# Patient Record
Sex: FEMALE | Race: WHITE | NOT HISPANIC OR LATINO | Employment: OTHER | URBAN - METROPOLITAN AREA
[De-identification: names, ages, dates, MRNs, and addresses within clinical notes are randomized per-mention and may not be internally consistent; named-entity substitution may affect disease eponyms.]

---

## 2017-01-12 ENCOUNTER — OFFICE VISIT (OUTPATIENT)
Dept: INTERNAL MEDICINE | Facility: CLINIC | Age: 67
End: 2017-01-12
Payer: COMMERCIAL

## 2017-01-12 VITALS
BODY MASS INDEX: 28.71 KG/M2 | WEIGHT: 157 LBS | HEART RATE: 78 BPM | OXYGEN SATURATION: 99 % | DIASTOLIC BLOOD PRESSURE: 76 MMHG | TEMPERATURE: 98.2 F | SYSTOLIC BLOOD PRESSURE: 114 MMHG

## 2017-01-12 DIAGNOSIS — F41.1 GENERALIZED ANXIETY DISORDER: ICD-10-CM

## 2017-01-12 DIAGNOSIS — B02.29 POST HERPETIC NEURALGIA: Primary | ICD-10-CM

## 2017-01-12 DIAGNOSIS — K21.9 GASTROESOPHAGEAL REFLUX DISEASE WITHOUT ESOPHAGITIS: ICD-10-CM

## 2017-01-12 PROCEDURE — 99214 OFFICE O/P EST MOD 30 MIN: CPT | Performed by: INTERNAL MEDICINE

## 2017-01-12 RX ORDER — ESOMEPRAZOLE MAGNESIUM 40 MG/1
40 CAPSULE, DELAYED RELEASE ORAL
Qty: 90 CAPSULE | Refills: 1 | Status: SHIPPED | OUTPATIENT
Start: 2017-01-12 | End: 2017-02-01

## 2017-01-12 RX ORDER — ZOLPIDEM TARTRATE 5 MG/1
5 TABLET ORAL AT BEDTIME
COMMUNITY
Start: 2017-01-12 | End: 2019-09-03

## 2017-01-12 RX ORDER — GABAPENTIN 300 MG/1
CAPSULE ORAL
Qty: 100 CAPSULE | Refills: 0 | Status: SHIPPED
Start: 2017-01-12 | End: 2017-04-04

## 2017-01-12 RX ORDER — LIDOCAINE 4 G/G
1-3 PATCH TOPICAL DAILY PRN
Qty: 50 PATCH | Refills: 2 | Status: SHIPPED | OUTPATIENT
Start: 2017-01-12 | End: 2017-04-04
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
## 2017-01-12 NOTE — PATIENT INSTRUCTIONS
Post herpetic neuralgia can take many weeks to get better, there is nothing that we can do to help the healing process, but the items below can help reduce the discomfort while your body take the time to heal.     *  Trial of Lidocaine patches to the affected area.  Place 1-3 patches right over the most painful spot, wear for 12 hours per day.  Pick whichever time period is the worst, either daytime or nighttime.     *  Gabapentin to help treat the nerve pain.  Take one 300 mg tablet per day in the evening for 2 days, then if no better, then take 1 tablet twice per day for 2 days, then 1 capsule three times per day for 2-3 days, then 2 capsules at bedtime, 1 cap in the morning and 1 in mid day.  Main side effects is drowsiness.     *  Contact me with an update in 2-3 weeks with your symptoms.     *  We can increase the dose of Gabapentin much higher if needed.     * back pain is due to sciatic nerve root irritation.

## 2017-01-12 NOTE — NURSING NOTE
"Chief Complaint   Patient presents with     Musculoskeletal Problem     R sciatic nerve pain X 4 days     Shingles     recheck med for nerve pain, tramadola nd gabapentin not working       Initial /76 mmHg  Pulse 78  Temp(Src) 98.2  F (36.8  C) (Oral)  Wt 157 lb (71.215 kg)  SpO2 99% Estimated body mass index is 28.71 kg/(m^2) as calculated from the following:    Height as of 12/5/16: 5' 2\" (1.575 m).    Weight as of this encounter: 157 lb (71.215 kg).  BP completed using cuff size: carlos Isaac CMA      "

## 2017-01-12 NOTE — MR AVS SNAPSHOT
After Visit Summary   1/12/2017    Liz Haider    MRN: [id number]           Patient Information     Date Of Birth          1950        Visit Information        Provider Department      1/12/2017 8:20 AM Abundio Burger MD [facility]        Today's Diagnoses     Post herpetic neuralgia    -  1     Generalized anxiety disorder         Gastroesophageal reflux disease without esophagitis           Care Instructions    Post herpetic neuralgia can take many weeks to get better, there is nothing that we can do to help the healing process, but the items below can help reduce the discomfort while your body take the time to heal.     *  Trial of Lidocaine patches to the affected area.  Place 1-3 patches right over the most painful spot, wear for 12 hours per day.  Pick whichever time period is the worst, either daytime or nighttime.     *  Gabapentin to help treat the nerve pain.  Take one 300 mg tablet per day in the evening for 2 days, then if no better, then take 1 tablet twice per day for 2 days, then 1 capsule three times per day for 2-3 days, then 2 capsules at bedtime, 1 cap in the morning and 1 in mid day.  Main side effects is drowsiness.     *  Contact me with an update in 2-3 weeks with your symptoms.     *  We can increase the dose of Gabapentin much higher if needed.     * back pain is due to sciatic nerve root irritation.          Follow-ups after your visit        Who to contact     If you have questions or need follow up information about today's clinic visit or your schedule please contact [facility] directly at [phone].  Normal or non-critical lab and imaging results will be communicated to you by MyChart, letter or phone within 4 business days after the clinic has received the results. If you do not hear from us within 7 days, please contact the clinic through MyChart or phone. If you have a critical or abnormal lab  result, we will notify you by phone as soon as possible.  Submit refill requests through [employer] or call your pharmacy and they will forward the refill request to us. Please allow 3 business days for your refill to be completed.          Additional Information About Your Visit        [facility] [facility] Information     [employer] gives you secure access to your electronic health record. If you see a primary care provider, you can also send messages to your care team and make appointments. If you have questions, please call your primary care clinic.  If you do not have a primary care provider, please call [phone] and they will assist you.        Care EveryWhere ID     This is your Care EveryWhere ID. This could be used by other organizations to access your [place] medical records  [id number]        Your Vitals Were     Pulse Temperature Pulse Oximetry             78 98.2  F (36.8  C) (Oral) 99%          Blood Pressure from Last 3 Encounters:   01/12/17 114/76   12/05/16 100/62   11/20/16 150/73    Weight from Last 3 Encounters:   01/12/17 157 lb (71.215 kg)   12/05/16 157 lb 12.8 oz (71.578 kg)   11/17/16 159 lb (72.122 kg)              Today, you had the following     No orders found for display         Today's Medication Changes          These changes are accurate as of: 1/12/17  9:19 AM.  If you have any questions, ask your nurse or doctor.               Start taking these medicines.        Dose/Directions    esomeprazole 40 MG CR capsule   Commonly known as:  nexIUM   Used for:  Gastroesophageal reflux disease without esophagitis   Started by:  Abundio Burger MD        Dose:  40 mg   Take 1 capsule (40 mg) by mouth every morning (before breakfast) Take 30-60 minutes before a eating.   Quantity:  90 capsule   Refills:  1       Lidocaine 4 % Ptch   Used for:  Post herpetic neuralgia   Started by:  Abundio Burger MD        Dose:  1-3 patch   Externally apply 1-3 patches topically daily as needed    Quantity:  50 patch   Refills:  2         These medicines have changed or have updated prescriptions.        Dose/Directions    gabapentin 300 MG capsule   Commonly known as:  NEURONTIN   This may have changed:  additional instructions   Used for:  Post herpetic neuralgia   Changed by:  Abundio Burger MD        Take 1 tablet (300 mg) every night for 2 days, then 1 tablet twice daily for 2 days, then 1 tablet TID for 2 days; then 2 cap at bedtime, 1 in am, 1 at noon   Quantity:  100 capsule   Refills:  0            Where to get your medicines      These medications were sent to [facility] [address] [place] [address] - [address] [facility]  [address] [facility] [address]     Phone:  [phone]    - esomeprazole 40 MG CR capsule  - gabapentin 300 MG capsule      Some of these will need a paper prescription and others can be bought over the counter.  Ask your nurse if you have questions.     Bring a paper prescription for each of these medications    - Lidocaine 4 % Ptch             Primary Care Provider Office Phone # Fax #    Abundio Burger [phone]       [facility] [address] [facility] [address]        Thank you!     Thank you for choosing [facility]  for your care. Our goal is always to provide you with excellent care. Hearing back from our patients is one way we can continue to improve our services. Please take a few minutes to complete the written survey that you may receive in the mail after your visit with us. Thank you!             Your Updated Medication List - Protect others around you: Learn how to safely use, store and throw away your medicines at www.disposemymeds.org.          This list is accurate as of: 1/12/17  9:19 AM.  Always use your most recent med list.                   Brand Name Dispense Instructions for use    * AMBIEN 10 MG tablet   Generic drug:  zolpidem      Take 0.5 tablets (5 mg) by  mouth nightly as needed for sleep       * AMBIEN 5 MG tablet   Generic drug:  zolpidem      Take 0.5-1 tablets (2.5-5 mg) by mouth nightly as needed for sleep       amLODIPine 10 MG tablet    NORVASC    90 tablet    Take 1 tablet (10 mg) by mouth every evening       aspirin 81 MG tablet     100    ONE DAILY       atenolol 100 MG tablet    TENORMIN    90 tablet    Take 1 tablet (100 mg) by mouth daily       atorvastatin 40 MG tablet    LIPITOR    90 tablet    Take 1 tablet (40 mg) by mouth At Bedtime       bimatoprost 0.03 % Soln    LATISSE    1 Bottle    Apply one per day at bedtime only to upper eyelid margin with applicator (not lower lid) as needed       CALCIUM 1200 PO      Take 2 tablets by mouth.       esomeprazole 40 MG CR capsule    nexIUM    90 capsule    Take 1 capsule (40 mg) by mouth every morning (before breakfast) Take 30-60 minutes before a eating.       gabapentin 300 MG capsule    NEURONTIN    100 capsule    Take 1 tablet (300 mg) every night for 2 days, then 1 tablet twice daily for 2 days, then 1 tablet TID for 2 days; then 2 cap at bedtime, 1 in am, 1 at noon       LANsoprazole 30 MG CR capsule    PREVACID    90 capsule    Take 1 capsule (30 mg) by mouth daily Take 30-60 minutes before a meal.       Lidocaine 4 % Ptch     50 patch    Externally apply 1-3 patches topically daily as needed       lisinopril 20 MG tablet    PRINIVIL/ZESTRIL    180 tablet    Take 1 tablet (20 mg) by mouth 2 times daily       LORazepam 0.5 MG tablet    ATIVAN    90 tablet    Take 1 tablet by mouth every 6 hours as needed for anxiety.       metoclopramide 5 MG tablet    REGLAN    90 tablet    Take 1 tablet (5 mg) by mouth 4 times daily as needed for nausea, esophageal reflux       Multi-vitamin Tabs tablet   Generic drug:  multivitamin, therapeutic with minerals     100    as directed       sertraline 100 MG tablet    ZOLOFT     Take 1 tablet (100 mg) by mouth daily Rx'd by psych       spironolactone 25 MG tablet     ALDACTONE    90 tablet    Take 1 tablet (25 mg) by mouth every morning       traMADol 50 MG tablet    ULTRAM    50 tablet    Take 1-2 tablets ( mg) by mouth 3 times daily as needed for moderate pain or severe pain maximum 6 tablet(s) per day       triamcinolone 0.1 % cream    KENALOG    45 g    Apply sparingly to affected area three times daily for 14 days.       VITAMIN D PO      Take 1 tablet by mouth       * Notice:  This list has 2 medication(s) that are the same as other medications prescribed for you. Read the directions carefully, and ask your doctor or other care provider to review them with you.

## 2017-01-26 ENCOUNTER — MYC MEDICAL ADVICE (OUTPATIENT)
Dept: INTERNAL MEDICINE | Facility: CLINIC | Age: 67
End: 2017-01-26

## 2017-01-26 DIAGNOSIS — K21.9 GASTROESOPHAGEAL REFLUX DISEASE WITHOUT ESOPHAGITIS: Primary | ICD-10-CM

## 2017-03-31 ENCOUNTER — RADIANT APPOINTMENT (OUTPATIENT)
Dept: MAMMOGRAPHY | Facility: CLINIC | Age: 67
End: 2017-03-31
Attending: INTERNAL MEDICINE
Payer: COMMERCIAL

## 2017-03-31 DIAGNOSIS — Z12.31 ENCOUNTER FOR SCREENING MAMMOGRAM FOR HIGH-RISK PATIENT: ICD-10-CM

## 2017-03-31 PROCEDURE — G0202 SCR MAMMO BI INCL CAD: HCPCS | Mod: TC

## 2017-04-04 ENCOUNTER — OFFICE VISIT (OUTPATIENT)
Dept: INTERNAL MEDICINE | Facility: CLINIC | Age: 67
End: 2017-04-04
Payer: COMMERCIAL

## 2017-04-04 ENCOUNTER — TELEPHONE (OUTPATIENT)
Dept: INTERNAL MEDICINE | Facility: CLINIC | Age: 67
End: 2017-04-04

## 2017-04-04 VITALS
HEART RATE: 69 BPM | HEIGHT: 62 IN | BODY MASS INDEX: 28.91 KG/M2 | TEMPERATURE: 97.9 F | DIASTOLIC BLOOD PRESSURE: 70 MMHG | WEIGHT: 157.1 LBS | SYSTOLIC BLOOD PRESSURE: 122 MMHG | OXYGEN SATURATION: 99 %

## 2017-04-04 DIAGNOSIS — I10 BENIGN ESSENTIAL HYPERTENSION: ICD-10-CM

## 2017-04-04 DIAGNOSIS — E78.5 HYPERLIPIDEMIA LDL GOAL <100: ICD-10-CM

## 2017-04-04 DIAGNOSIS — Z11.59 NEED FOR HEPATITIS C SCREENING TEST: ICD-10-CM

## 2017-04-04 DIAGNOSIS — F41.1 GENERALIZED ANXIETY DISORDER: ICD-10-CM

## 2017-04-04 DIAGNOSIS — I71.43 ANEURYSM OF INFRARENAL ABDOMINAL AORTA (H): ICD-10-CM

## 2017-04-04 DIAGNOSIS — Z13.29 SCREENING FOR THYROID DISORDER: ICD-10-CM

## 2017-04-04 DIAGNOSIS — Z00.00 MEDICARE ANNUAL WELLNESS VISIT, SUBSEQUENT: Primary | ICD-10-CM

## 2017-04-04 DIAGNOSIS — I10 ESSENTIAL HYPERTENSION: ICD-10-CM

## 2017-04-04 DIAGNOSIS — H02.729 HYPOTRICHOSIS OF EYELID, UNSPECIFIED LATERALITY: ICD-10-CM

## 2017-04-04 DIAGNOSIS — I25.119 CORONARY ARTERY DISEASE WITH ANGINA PECTORIS, UNSPECIFIED VESSEL OR LESION TYPE, UNSPECIFIED WHETHER NATIVE OR TRANSPLANTED HEART (H): ICD-10-CM

## 2017-04-04 LAB
BASOPHILS # BLD AUTO: 0 10E9/L (ref 0–0.2)
BASOPHILS NFR BLD AUTO: 0.4 %
DIFFERENTIAL METHOD BLD: NORMAL
EOSINOPHIL # BLD AUTO: 0.1 10E9/L (ref 0–0.7)
EOSINOPHIL NFR BLD AUTO: 1.1 %
ERYTHROCYTE [DISTWIDTH] IN BLOOD BY AUTOMATED COUNT: 13.4 % (ref 10–15)
HCT VFR BLD AUTO: 40.8 % (ref 35–47)
HGB BLD-MCNC: 13.3 G/DL (ref 11.7–15.7)
LYMPHOCYTES # BLD AUTO: 1.3 10E9/L (ref 0.8–5.3)
LYMPHOCYTES NFR BLD AUTO: 17 %
MCH RBC QN AUTO: 30.4 PG (ref 26.5–33)
MCHC RBC AUTO-ENTMCNC: 32.6 G/DL (ref 31.5–36.5)
MCV RBC AUTO: 93 FL (ref 78–100)
MONOCYTES # BLD AUTO: 0.6 10E9/L (ref 0–1.3)
MONOCYTES NFR BLD AUTO: 7.5 %
NEUTROPHILS # BLD AUTO: 5.6 10E9/L (ref 1.6–8.3)
NEUTROPHILS NFR BLD AUTO: 74 %
PLATELET # BLD AUTO: 217 10E9/L (ref 150–450)
RBC # BLD AUTO: 4.37 10E12/L (ref 3.8–5.2)
WBC # BLD AUTO: 7.5 10E9/L (ref 4–11)

## 2017-04-04 PROCEDURE — 99397 PER PM REEVAL EST PAT 65+ YR: CPT | Performed by: INTERNAL MEDICINE

## 2017-04-04 PROCEDURE — 84443 ASSAY THYROID STIM HORMONE: CPT | Performed by: INTERNAL MEDICINE

## 2017-04-04 PROCEDURE — 80061 LIPID PANEL: CPT | Performed by: INTERNAL MEDICINE

## 2017-04-04 PROCEDURE — 80053 COMPREHEN METABOLIC PANEL: CPT | Performed by: INTERNAL MEDICINE

## 2017-04-04 PROCEDURE — 86803 HEPATITIS C AB TEST: CPT | Performed by: INTERNAL MEDICINE

## 2017-04-04 PROCEDURE — 36415 COLL VENOUS BLD VENIPUNCTURE: CPT | Performed by: INTERNAL MEDICINE

## 2017-04-04 PROCEDURE — 85025 COMPLETE CBC W/AUTO DIFF WBC: CPT | Performed by: INTERNAL MEDICINE

## 2017-04-04 RX ORDER — BIMATOPROST 3 UG/ML
SOLUTION TOPICAL
Qty: 1 BOTTLE | Refills: 1 | Status: SHIPPED | OUTPATIENT
Start: 2017-04-04 | End: 2017-08-17

## 2017-04-04 NOTE — NURSING NOTE
"Chief Complaint   Patient presents with     Medicare Visit     pt is fasting       Initial /70  Pulse 69  Temp 97.9  F (36.6  C) (Oral)  Ht 5' 1.5\" (1.562 m)  Wt 157 lb 1.6 oz (71.3 kg)  SpO2 99%  BMI 29.2 kg/m2 Estimated body mass index is 29.2 kg/(m^2) as calculated from the following:    Height as of this encounter: 5' 1.5\" (1.562 m).    Weight as of this encounter: 157 lb 1.6 oz (71.3 kg).  Medication Reconciliation: complete   Lizzy Isaac CMA      "

## 2017-04-04 NOTE — PATIENT INSTRUCTIONS
"  *  Continue all medications at the same doses.  Contact your usual pharmacy if you need refills.     *  Abdominal ultrasound to recheck the small infrarenal abdominal aortic aneurysm that has remained stable over past few years.   Either get this at [facility] or [facility]       CARPAL TUNNEL SYNDROME:     *  The numbness and tingling in your hand(s) is caused by carpal tunnel syndrome, an irritation and inflammation of the median nerve at your wrist.     *  Use a wrist brace to help keep the wrist in a neutral position to help take pressure off that nerve.              *  Specifically wear the wrist brace when you are sleeping, when you drive, when you use the computer, and any other time that you are active with your hands.  It is OK to take the wrist brace to bathe or any other time.    *  Avoid or modify activities that require lots of hand, or wrist movements.      *  If you continue to have symptoms dsepite these conservative measures, then you may need to see the orthopedic hand surgeons for consideration of a surgical procedure to release the pressure in the carpal tunnel to cure the carpal tunnel syndrome.  Let us know if this is the case and we can refer you.          5 GOALS TO PREVENT VASCULAR DISEASE:     1.  Aggressive blood pressure control, under 130/80 ideally.  Using medications if needed.    Your blood pressure is under good control    BP Readings from Last 4 Encounters:   04/04/17 122/70   01/12/17 114/76   12/05/16 100/62   11/20/16 150/73       2.  Aggressive LDL cholesterol (\"bad cholesterol\") lowering as indicated.    Your goal is an LDL under 130 for sure, preferably under 100.  (If you have diabetes or previous vascular disease, the the LDL goals would be under 100 for sure, preferably under 70.)    New guidelines identify four high-risk groups who could benefit from statins:   *people with pre-existing heart disease, such as those who have had a heart attack;   *people " "ages 40 to 75 who have diabetes of any type  *patients ages 40 to 75 with at least a 7.5% risk of developing cardiovascular disease over the next decade, according to a formula described in the guidelines  *patients with the sort of super-high cholesterol that sometimes runs in families, as evidenced by an LDL of 190 milligrams per deciliter or higher    Your cholesterol levels are well controlled.    Recent Labs   Lab Test  07/21/16   1056  06/09/15   1157  10/07/14   1002   CHOL  188  179  198   HDL  69  69  85   LDL  99  92  98   TRIG  100  89  74   CHOLHDLRATIO   --   2.6  2.3       3.  Aggressive diabetic prevention, screening and/or management.      You do not have diabetes as of the most recent blood tests.     4.  No smoking    5.  Consider taking low dose aspirin (81 mg) tablet once per day over the age of 50, every day unless there is a specific reason that you cannot take aspirin (such as side effect, allergy, or you are on another \"blood thinner\").        --Based on your current cardiac risk factors, you should take Aspirin 81 mg once per day if you are over 50 years of age.             Preventive Health Recommendations    Female Ages 65 +    Yearly exam:     See your health care provider every year in order to  o Review health changes.   o Discuss preventive care.    o Review your medicines if your doctor has prescribed any.      You no longer need a yearly Pap test unless you've had an abnormal Pap test in the past 10 years. If you have vaginal symptoms, such as bleeding or discharge, be sure to talk with your provider about a Pap test.      Every 1 to 2 years, have a mammogram.  If you are over 69, talk with your health care provider about whether or not you want to continue having screening mammograms.      Every 10 years, have a colonoscopy. Or, have a yearly FIT test (stool test). These exams will check for colon cancer.       Have a cholesterol test every 5 years, or more often if your doctor " advises it.       Have a diabetes test (fasting glucose) every three years. If you are at risk for diabetes, you should have this test more often.       At age 65, have a bone density scan (DEXA) to check for osteoporosis (brittle bone disease).    Shots:    Get a flu shot each year.    Get a tetanus shot every 10 years.    Talk to your doctor about your pneumonia vaccines. There are now two you should receive - Pneumovax (PPSV 23) and Prevnar (PCV 13).    Talk to your doctor about the shingles vaccine.    Talk to your doctor about the hepatitis B vaccine.    Nutrition:     Eat at least 5 servings of fruits and vegetables each day.      Eat whole-grain bread, whole-wheat pasta and brown rice instead of white grains and rice.      Talk to your provider about Calcium and Vitamin D.     Lifestyle    Exercise at least 150 minutes a week (30 minutes a day, 5 days a week). This will help you control your weight and prevent disease.      Limit alcohol to one drink per day.      No smoking.       Wear sunscreen to prevent skin cancer.       See your dentist twice a year for an exam and cleaning.      See your eye doctor every 1 to 2 years to screen for conditions such as glaucoma, macular degeneration and cataracts.

## 2017-04-04 NOTE — TELEPHONE ENCOUNTER
latisse 0.03%      Last Written Prescription Date:  08/15/16  Last Fill Quantity: 1bottle,   # refills: 2  Last Office Visit with [facility] prescribing provider: 01/12/17  Future Office visit:   04/04/17    Routing refill request to provider for review/approval because:  Drug not on the [facility] refill protocol or controlled substance

## 2017-04-04 NOTE — MR AVS SNAPSHOT
After Visit Summary   4/4/2017    Liz Haider    MRN: [id number]           Patient Information     Date Of Birth          1950        Visit Information        Provider Department      4/4/2017 8:20 AM Abundio Burger MD [facility]        Today's Diagnoses     Medicare annual wellness visit, subsequent    -  1    Coronary artery disease with prior NSTEMI (HCC)        Benign essential hypertension        Hyperlipidemia LDL goal <100        Essential hypertension        Generalized anxiety disorder        Screening for thyroid disorder        Need for hepatitis C screening test        Aneurysm of infrarenal abdominal aorta (H)          Care Instructions      *  Continue all medications at the same doses.  Contact your usual pharmacy if you need refills.     *  Abdominal ultrasound to recheck the small infrarenal abdominal aortic aneurysm that has remained stable over past few years.   Either get this at [facility] or [facility]       CARPAL TUNNEL SYNDROME:     *  The numbness and tingling in your hand(s) is caused by carpal tunnel syndrome, an irritation and inflammation of the median nerve at your wrist.     *  Use a wrist brace to help keep the wrist in a neutral position to help take pressure off that nerve.              *  Specifically wear the wrist brace when you are sleeping, when you drive, when you use the computer, and any other time that you are active with your hands.  It is OK to take the wrist brace to bathe or any other time.    *  Avoid or modify activities that require lots of hand, or wrist movements.      *  If you continue to have symptoms dsepite these conservative measures, then you may need to see the orthopedic hand surgeons for consideration of a surgical procedure to release the pressure in the carpal tunnel to cure the carpal tunnel syndrome.  Let us know if this is the case and we can refer you.          5 GOALS TO  "PREVENT VASCULAR DISEASE:     1.  Aggressive blood pressure control, under 130/80 ideally.  Using medications if needed.    Your blood pressure is under good control    BP Readings from Last 4 Encounters:   04/04/17 122/70   01/12/17 114/76   12/05/16 100/62   11/20/16 150/73       2.  Aggressive LDL cholesterol (\"bad cholesterol\") lowering as indicated.    Your goal is an LDL under 130 for sure, preferably under 100.  (If you have diabetes or previous vascular disease, the the LDL goals would be under 100 for sure, preferably under 70.)    New guidelines identify four high-risk groups who could benefit from statins:   *people with pre-existing heart disease, such as those who have had a heart attack;   *people ages 40 to 75 who have diabetes of any type  *patients ages 40 to 75 with at least a 7.5% risk of developing cardiovascular disease over the next decade, according to a formula described in the guidelines  *patients with the sort of super-high cholesterol that sometimes runs in families, as evidenced by an LDL of 190 milligrams per deciliter or higher    Your cholesterol levels are well controlled.    Recent Labs   Lab Test  07/21/16   1056  06/09/15   1157  10/07/14   1002   CHOL  188  179  198   HDL  69  69  85   LDL  99  92  98   TRIG  100  89  74   CHOLHDLRATIO   --   2.6  2.3       3.  Aggressive diabetic prevention, screening and/or management.      You do not have diabetes as of the most recent blood tests.     4.  No smoking    5.  Consider taking low dose aspirin (81 mg) tablet once per day over the age of 50, every day unless there is a specific reason that you cannot take aspirin (such as side effect, allergy, or you are on another \"blood thinner\").        --Based on your current cardiac risk factors, you should take Aspirin 81 mg once per day if you are over 50 years of age.             Preventive Health Recommendations    Female Ages 65 +    Yearly exam:     See your health care provider every " year in order to  o Review health changes.   o Discuss preventive care.    o Review your medicines if your doctor has prescribed any.      You no longer need a yearly Pap test unless you've had an abnormal Pap test in the past 10 years. If you have vaginal symptoms, such as bleeding or discharge, be sure to talk with your provider about a Pap test.      Every 1 to 2 years, have a mammogram.  If you are over 69, talk with your health care provider about whether or not you want to continue having screening mammograms.      Every 10 years, have a colonoscopy. Or, have a yearly FIT test (stool test). These exams will check for colon cancer.       Have a cholesterol test every 5 years, or more often if your doctor advises it.       Have a diabetes test (fasting glucose) every three years. If you are at risk for diabetes, you should have this test more often.       At age 65, have a bone density scan (DEXA) to check for osteoporosis (brittle bone disease).    Shots:    Get a flu shot each year.    Get a tetanus shot every 10 years.    Talk to your doctor about your pneumonia vaccines. There are now two you should receive - Pneumovax (PPSV 23) and Prevnar (PCV 13).    Talk to your doctor about the shingles vaccine.    Talk to your doctor about the hepatitis B vaccine.    Nutrition:     Eat at least 5 servings of fruits and vegetables each day.      Eat whole-grain bread, whole-wheat pasta and brown rice instead of white grains and rice.      Talk to your provider about Calcium and Vitamin D.     Lifestyle    Exercise at least 150 minutes a week (30 minutes a day, 5 days a week). This will help you control your weight and prevent disease.      Limit alcohol to one drink per day.      No smoking.       Wear sunscreen to prevent skin cancer.       See your dentist twice a year for an exam and cleaning.      See your eye doctor every 1 to 2 years to screen for conditions such as glaucoma, macular degeneration and  "cataracts.        Follow-ups after your visit        Future tests that were ordered for you today     Open Future Orders        Priority Expected Expires Ordered    US abdominal aorta limited Routine  4/4/2018 4/4/2017            Who to contact     If you have questions or need follow up information about today's clinic visit or your schedule please contact [facility] directly at [phone].  Normal or non-critical lab and imaging results will be communicated to you by [employer], letter or phone within 4 business days after the clinic has received the results. If you do not hear from us within 7 days, please contact the clinic through [employer] or phone. If you have a critical or abnormal lab result, we will notify you by phone as soon as possible.  Submit refill requests through [employer] or call your pharmacy and they will forward the refill request to us. Please allow 3 business days for your refill to be completed.          Additional Information About Your Visit        [employer] Information     [employer] gives you secure access to your electronic health record. If you see a primary care provider, you can also send messages to your care team and make appointments. If you have questions, please call your primary care clinic.  If you do not have a primary care provider, please call [phone] and they will assist you.        Care EveryWhere ID     This is your Care EveryWhere ID. This could be used by other organizations to access your [facility] medical records  [id number]        Your Vitals Were     Pulse Temperature Height Pulse Oximetry BMI (Body Mass Index)       69 97.9  F (36.6  C) (Oral) 5' 1.5\" (1.562 m) 99% 29.2 kg/m2        Blood Pressure from Last 3 Encounters:   04/04/17 122/70   01/12/17 114/76   12/05/16 100/62    Weight from Last 3 Encounters:   04/04/17 157 lb 1.6 oz (71.3 kg)   01/12/17 157 lb (71.2 kg)   12/05/16 157 lb 12.8 oz (71.6 kg)              We Performed the Following "     CBC with platelets and differential     Comprehensive metabolic panel (BMP + Alb, Alk Phos, ALT, AST, Total. Bili, TP)     Hepatitis C antibody     Lipid Profile with reflex to direct LDL     TSH with free T4 reflex          Today's Medication Changes          These changes are accurate as of: 4/4/17  9:23 AM.  If you have any questions, ask your nurse or doctor.               Stop taking these medicines if you haven't already. Please contact your care team if you have questions.     LANsoprazole 30 MG CR capsule   Commonly known as:  PREVACID   Stopped by:  Abundio Burger MD                    Primary Care Provider Office Phone # Fax #    Abundio Burger [phone]       [facility] [address]  [facility] [address]        Thank you!     Thank you for choosing [facility]  for your care. Our goal is always to provide you with excellent care. Hearing back from our patients is one way we can continue to improve our services. Please take a few minutes to complete the written survey that you may receive in the mail after your visit with us. Thank you!             Your Updated Medication List - Protect others around you: Learn how to safely use, store and throw away your medicines at www.disposemymeds.org.          This list is accurate as of: 4/4/17  9:23 AM.  Always use your most recent med list.                   Brand Name Dispense Instructions for use    AMBIEN 5 MG tablet   Generic drug:  zolpidem      Take 0.5-1 tablets (2.5-5 mg) by mouth nightly as needed for sleep       amLODIPine 10 MG tablet    NORVASC    90 tablet    Take 1 tablet (10 mg) by mouth every evening       aspirin 81 MG tablet     100    ONE DAILY       atenolol 100 MG tablet    TENORMIN    90 tablet    Take 1 tablet (100 mg) by mouth daily       atorvastatin 40 MG tablet    LIPITOR    90 tablet    Take 1 tablet (40 mg) by mouth At Bedtime       bimatoprost 0.03 % Soln     LATISSE    1 Bottle    Apply one per day at bedtime only to upper eyelid margin with applicator (not lower lid) as needed       CALCIUM 1200 PO      Take 2 tablets by mouth.       lisinopril 20 MG tablet    PRINIVIL/ZESTRIL    180 tablet    Take 1 tablet (20 mg) by mouth 2 times daily       LORazepam 0.5 MG tablet    ATIVAN    90 tablet    Take 1 tablet by mouth every 6 hours as needed for anxiety.       Multi-vitamin Tabs tablet   Generic drug:  multivitamin, therapeutic with minerals     100    as directed       omeprazole 20 MG CR capsule    priLOSEC    90 capsule    Take 1 capsule (20 mg) by mouth daily       sertraline 100 MG tablet    ZOLOFT     Take 1 tablet (100 mg) by mouth daily Rx'd by psych       spironolactone 25 MG tablet    ALDACTONE    90 tablet    Take 1 tablet (25 mg) by mouth every morning       triamcinolone 0.1 % cream    KENALOG    45 g    Apply sparingly to affected area three times daily for 14 days.       VITAMIN D PO      Take 1 tablet by mouth
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
## 2017-04-05 ENCOUNTER — TELEPHONE (OUTPATIENT)
Dept: INTERNAL MEDICINE | Facility: CLINIC | Age: 67
End: 2017-04-05

## 2017-04-05 ENCOUNTER — RADIANT APPOINTMENT (OUTPATIENT)
Dept: ULTRASOUND IMAGING | Facility: CLINIC | Age: 67
End: 2017-04-05
Attending: INTERNAL MEDICINE
Payer: COMMERCIAL

## 2017-04-05 DIAGNOSIS — I71.43 ANEURYSM OF INFRARENAL ABDOMINAL AORTA (H): ICD-10-CM

## 2017-04-05 LAB
ALBUMIN SERPL-MCNC: 4.1 G/DL (ref 3.4–5)
ALP SERPL-CCNC: 64 U/L (ref 40–150)
ALT SERPL W P-5'-P-CCNC: 20 U/L (ref 0–50)
ANION GAP SERPL CALCULATED.3IONS-SCNC: 8 MMOL/L (ref 3–14)
AST SERPL W P-5'-P-CCNC: 13 U/L (ref 0–45)
BILIRUB SERPL-MCNC: 0.6 MG/DL (ref 0.2–1.3)
BUN SERPL-MCNC: 18 MG/DL (ref 7–30)
CALCIUM SERPL-MCNC: 9.4 MG/DL (ref 8.5–10.1)
CHLORIDE SERPL-SCNC: 101 MMOL/L (ref 94–109)
CHOLEST SERPL-MCNC: 175 MG/DL
CO2 SERPL-SCNC: 27 MMOL/L (ref 20–32)
CREAT SERPL-MCNC: 0.87 MG/DL (ref 0.52–1.04)
GFR SERPL CREATININE-BSD FRML MDRD: 64 ML/MIN/1.7M2
GLUCOSE SERPL-MCNC: 103 MG/DL (ref 70–99)
HCV AB SERPL QL IA: NORMAL
HDLC SERPL-MCNC: 66 MG/DL
LDLC SERPL CALC-MCNC: 92 MG/DL
NONHDLC SERPL-MCNC: 109 MG/DL
POTASSIUM SERPL-SCNC: 4.5 MMOL/L (ref 3.4–5.3)
PROT SERPL-MCNC: 7.2 G/DL (ref 6.8–8.8)
SODIUM SERPL-SCNC: 136 MMOL/L (ref 133–144)
TRIGL SERPL-MCNC: 86 MG/DL
TSH SERPL DL<=0.005 MIU/L-ACNC: 1.27 MU/L (ref 0.4–4)

## 2017-04-05 PROCEDURE — 76775 US EXAM ABDO BACK WALL LIM: CPT

## 2017-04-05 NOTE — TELEPHONE ENCOUNTER
"Please call the patient.     The ultrasound showed no evidence for abdominal aortic aneurysm.   The prior \"aneurysm\" reported years ago was actually not a true aneurysm by size criteria.  The abdominal aorta was borderline widened, and perhaps \"over called\" as a abdominal aortic aneurysm back a few years ago.    No further studies needed.        "

## 2017-04-06 DIAGNOSIS — I10 ESSENTIAL HYPERTENSION: ICD-10-CM

## 2017-04-07 RX ORDER — AMLODIPINE BESYLATE 10 MG/1
TABLET ORAL
Qty: 90 TABLET | Refills: 3 | Status: SHIPPED | OUTPATIENT
Start: 2017-04-07 | End: 2018-01-12

## 2017-04-07 NOTE — TELEPHONE ENCOUNTER
amLODIPine (NORVASC) 10 MG tablet      Last Written Prescription Date: 7/13/16  Last Fill Quantity: 90, # refills: 2  Last Office Visit with G, P or [facility] prescribing provider: 4/4/17       Potassium   Date Value Ref Range Status   04/04/2017 4.5 3.4 - 5.3 mmol/L Final     Creatinine   Date Value Ref Range Status   04/04/2017 0.87 0.52 - 1.04 mg/dL Final     BP Readings from Last 3 Encounters:   04/04/17 122/70   01/12/17 114/76   12/05/16 100/62

## 2017-04-13 ENCOUNTER — TELEPHONE (OUTPATIENT)
Dept: INTERNAL MEDICINE | Facility: CLINIC | Age: 67
End: 2017-04-13

## 2017-04-13 NOTE — TELEPHONE ENCOUNTER
Prior authorization    Medication name bimatoprost  Insurance medica  Insurance ID number [id number]  Prior authorization faxed through cover my meds

## 2017-04-27 NOTE — TELEPHONE ENCOUNTER
Please let patient know that this medication not covered.    If she wants to use it, she must pay cash for it.   There is no PA to be had for this.     Close encounter when done.

## 2017-05-03 ENCOUNTER — OFFICE VISIT (OUTPATIENT)
Dept: INTERNAL MEDICINE | Facility: CLINIC | Age: 67
End: 2017-05-03
Payer: COMMERCIAL

## 2017-05-03 VITALS
TEMPERATURE: 97.8 F | HEART RATE: 74 BPM | DIASTOLIC BLOOD PRESSURE: 72 MMHG | SYSTOLIC BLOOD PRESSURE: 142 MMHG | HEIGHT: 62 IN | WEIGHT: 158.7 LBS | OXYGEN SATURATION: 98 % | BODY MASS INDEX: 29.21 KG/M2

## 2017-05-03 DIAGNOSIS — R30.0 DYSURIA: ICD-10-CM

## 2017-05-03 DIAGNOSIS — N76.0 VAGINITIS AND VULVOVAGINITIS: Primary | ICD-10-CM

## 2017-05-03 LAB
ALBUMIN UR-MCNC: NEGATIVE MG/DL
APPEARANCE UR: CLEAR
BILIRUB UR QL STRIP: NEGATIVE
COLOR UR AUTO: YELLOW
GLUCOSE UR STRIP-MCNC: NEGATIVE MG/DL
HGB UR QL STRIP: ABNORMAL
KETONES UR STRIP-MCNC: NEGATIVE MG/DL
LEUKOCYTE ESTERASE UR QL STRIP: ABNORMAL
NITRATE UR QL: NEGATIVE
PH UR STRIP: 6 PH (ref 5–7)
RBC #/AREA URNS AUTO: ABNORMAL /HPF (ref 0–2)
SP GR UR STRIP: <=1.005 (ref 1–1.03)
URN SPEC COLLECT METH UR: ABNORMAL
UROBILINOGEN UR STRIP-ACNC: 0.2 EU/DL (ref 0.2–1)
WBC #/AREA URNS AUTO: ABNORMAL /HPF (ref 0–2)

## 2017-05-03 PROCEDURE — 99213 OFFICE O/P EST LOW 20 MIN: CPT | Performed by: PHYSICIAN ASSISTANT

## 2017-05-03 PROCEDURE — 81001 URINALYSIS AUTO W/SCOPE: CPT | Performed by: PHYSICIAN ASSISTANT

## 2017-05-03 RX ORDER — FLUCONAZOLE 150 MG/1
150 TABLET ORAL ONCE
Qty: 1 TABLET | Refills: 0 | Status: SHIPPED | OUTPATIENT
Start: 2017-05-03 | End: 2017-05-03

## 2017-05-03 NOTE — NURSING NOTE
"Chief Complaint   Patient presents with     Dysuria     since am        Initial /72 (BP Location: Left arm, Patient Position: Chair, Cuff Size: Adult Regular)  Pulse 74  Temp 97.8  F (36.6  C) (Oral)  Ht 5' 1.5\" (1.562 m)  Wt 158 lb 11.2 oz (72 kg)  SpO2 98%  BMI 29.5 kg/m2 Estimated body mass index is 29.5 kg/(m^2) as calculated from the following:    Height as of this encounter: 5' 1.5\" (1.562 m).    Weight as of this encounter: 158 lb 11.2 oz (72 kg).  Medication Reconciliation: complete    "

## 2017-05-03 NOTE — MR AVS SNAPSHOT
"              After Visit Summary   5/3/2017    Liz Haider    MRN: [id number]           Patient Information     Date Of Birth          1950        Visit Information        Provider Department      5/3/2017 3:00 PM Ria Douglas PA-C [facility]        Today's Diagnoses     Vaginitis and vulvovaginitis    -  1    Dysuria          Care Instructions    Hold your cholesterol medications for the next 2 nights after taking the diflucan        Follow-ups after your visit        Who to contact     If you have questions or need follow up information about today's clinic visit or your schedule please contact [facility] directly at [phone].  Normal or non-critical lab and imaging results will be communicated to you by [employer], letter or phone within 4 business days after the clinic has received the results. If you do not hear from us within 7 days, please contact the clinic through [employer] or phone. If you have a critical or abnormal lab result, we will notify you by phone as soon as possible.  Submit refill requests through [employer] or call your pharmacy and they will forward the refill request to us. Please allow 3 business days for your refill to be completed.          Additional Information About Your Visit        MyChart Information     [employer] gives you secure access to your electronic health record. If you see a primary care provider, you can also send messages to your care team and make appointments. If you have questions, please call your primary care clinic.  If you do not have a primary care provider, please call [phone] and they will assist you.        Care EveryWhere ID     This is your Care EveryWhere ID. This could be used by other organizations to access your [facility] medical records  [id number]        Your Vitals Were     Pulse Temperature Height Pulse Oximetry BMI (Body Mass Index)       74 97.8  F (36.6  C) (Oral) 5' 1.5\" (1.562 " m) 98% 29.5 kg/m2        Blood Pressure from Last 3 Encounters:   05/03/17 142/72   04/04/17 122/70   01/12/17 114/76    Weight from Last 3 Encounters:   05/03/17 158 lb 11.2 oz (72 kg)   04/04/17 157 lb 1.6 oz (71.3 kg)   01/12/17 157 lb (71.2 kg)              We Performed the Following     UA with Microscopic reflex to Culture          Today's Medication Changes          These changes are accurate as of: 5/3/17  3:45 PM.  If you have any questions, ask your nurse or doctor.               Start taking these medicines.        Dose/Directions    fluconazole 150 MG tablet   Commonly known as:  DIFLUCAN   Used for:  Vaginitis and vulvovaginitis   Started by:  Ria Douglas PA-C        Dose:  150 mg   Take 1 tablet (150 mg) by mouth once for 1 dose   Quantity:  1 tablet   Refills:  0            Where to get your medicines      These medications were sent to [facility] [address] [place] [id number]     Phone:  [phone]     fluconazole 150 MG tablet                Primary Care Provider Office Phone # Fax #    Abundio Burger [phone]       [facility] [address] [facility] [address]        Thank you!     Thank you for choosing [facility]  for your care. Our goal is always to provide you with excellent care. Hearing back from our patients is one way we can continue to improve our services. Please take a few minutes to complete the written survey that you may receive in the mail after your visit with us. Thank you!             Your Updated Medication List - Protect others around you: Learn how to safely use, store and throw away your medicines at www.disposemymeds.org.          This list is accurate as of: 5/3/17  3:45 PM.  Always use your most recent med list.                   Brand Name Dispense Instructions for use    AMBIEN 5 MG tablet   Generic drug:  zolpidem       Take 0.5-1 tablets (2.5-5 mg) by mouth nightly as needed for sleep       amLODIPine 10 MG tablet    NORVASC    90 tablet    TAKE ONE TABLET BY MOUTH ONCE DAILY IN THE EVENING       aspirin 81 MG tablet     100    ONE DAILY       atenolol 100 MG tablet    TENORMIN    90 tablet    Take 1 tablet (100 mg) by mouth daily       atorvastatin 40 MG tablet    LIPITOR    90 tablet    Take 1 tablet (40 mg) by mouth At Bedtime       bimatoprost 0.03 % Soln    LATISSE    1 Bottle    Apply one per day at bedtime only to upper eyelid margin with applicator (not lower lid) as needed       CALCIUM 1200 PO      Take 2 tablets by mouth.       fluconazole 150 MG tablet    DIFLUCAN    1 tablet    Take 1 tablet (150 mg) by mouth once for 1 dose       lisinopril 20 MG tablet    PRINIVIL/ZESTRIL    180 tablet    Take 1 tablet (20 mg) by mouth 2 times daily       LORazepam 0.5 MG tablet    ATIVAN    90 tablet    Take 1 tablet by mouth every 6 hours as needed for anxiety.       Multi-vitamin Tabs tablet   Generic drug:  multivitamin, therapeutic with minerals     100    as directed       omeprazole 20 MG CR capsule    priLOSEC    90 capsule    Take 1 capsule (20 mg) by mouth daily       sertraline 100 MG tablet    ZOLOFT     Take 1 tablet (100 mg) by mouth daily Rx'd by psych       spironolactone 25 MG tablet    ALDACTONE    90 tablet    Take 1 tablet (25 mg) by mouth every morning       triamcinolone 0.1 % cream    KENALOG    45 g    Apply sparingly to affected area three times daily for 14 days.       VITAMIN D PO      Take 1 tablet by mouth

## 2017-05-03 NOTE — PROGRESS NOTES
"  SUBJECTIVE:                                                    Liz Haider is a 66 year old female who presents to clinic today for the following health issues:      URINARY TRACT SYMPTOMS     Onset: since this morning    Description:   Painful urination (Dysuria): YES  Blood in urine (Hematuria): YES- when wiping had pinkish tint     Delay in urine (Hesitency): no     Intensity: moderate    Progression of Symptoms:  improving    Accompanying Signs & Symptoms:  Fever/chills: no   Flank pain no  Mild low back pain   Nausea and vomiting: no   Any vaginal symptoms: odor,and irritation itching.   Has to wear a pad due to irritable bowel issues.    Abdominal/Pelvic Pain: no    History:   History of frequent UTI's: no   History of kidney stones: no   Sexually Active: no   Possibility of pregnancy: No    Precipitating factors:            Therapies Tried and outcome: otc monistat for yeast.             -------------------------------------    Problem list and histories reviewed & adjusted, as indicated.  Additional history: as documented    Labs reviewed in EPIC    Reviewed and updated as needed this visit by clinical staff  Tobacco  Allergies       Reviewed and updated as needed this visit by Provider         ROS:  Constitutional, HEENT, cardiovascular, pulmonary, gi and gu systems are negative, except as otherwise noted.    OBJECTIVE:                                                    /72 (BP Location: Left arm, Patient Position: Chair, Cuff Size: Adult Regular)  Pulse 74  Temp 97.8  F (36.6  C) (Oral)  Ht 5' 1.5\" (1.562 m)  Wt 158 lb 11.2 oz (72 kg)  SpO2 98%  BMI 29.5 kg/m2  Body mass index is 29.5 kg/(m^2).  GENERAL: healthy, alert and no distress  RESP: lungs clear to auscultation - no rales, rhonchi or wheezes  CV: regular rate and rhythm, normal S1 S2, no S3 or S4, no murmur, click or rub, no peripheral edema and peripheral pulses strong  ABDOMEN: soft, nontender, no hepatosplenomegaly, no masses " and bowel sounds normal  MS: no gross musculoskeletal defects noted, no edema    Diagnostic Test Results:  Results for orders placed or performed in visit on 05/03/17 (from the past 24 hour(s))   UA with Microscopic reflex to Culture   Result Value Ref Range    Color Urine Yellow     Appearance Urine Clear     Glucose Urine Negative NEG mg/dL    Bilirubin Urine Negative NEG    Ketones Urine Negative NEG mg/dL    Specific Gravity Urine <=1.005 1.003 - 1.035    pH Urine 6.0 5.0 - 7.0 pH    Protein Albumin Urine Negative NEG mg/dL    Urobilinogen Urine 0.2 0.2 - 1.0 EU/dL    Nitrite Urine Negative NEG    Blood Urine Large (A) NEG    Leukocyte Esterase Urine Trace (A) NEG    Source Midstream Urine     WBC Urine O - 2 0 - 2 /HPF    RBC Urine 2-5 (A) 0 - 2 /HPF        ASSESSMENT/PLAN:                                                            1. Vaginitis and vulvovaginitis    - fluconazole (DIFLUCAN) 150 MG tablet; Take 1 tablet (150 mg) by mouth once for 1 dose  Dispense: 1 tablet; Refill: 0    2. Dysuria    - UA with Microscopic reflex to Culture    See Patient Instructions  If symptoms do not improve by next week then needs recheck with pelvic exam     Ria Douglas PA-C  [facility]

## 2017-05-11 DIAGNOSIS — I10 ESSENTIAL HYPERTENSION: ICD-10-CM

## 2017-05-11 RX ORDER — SPIRONOLACTONE 25 MG/1
TABLET ORAL
Qty: 90 TABLET | Refills: 3 | Status: SHIPPED | OUTPATIENT
Start: 2017-05-11 | End: 2018-05-03

## 2017-05-22 DIAGNOSIS — E78.5 HYPERLIPIDEMIA LDL GOAL <100: ICD-10-CM

## 2017-05-22 RX ORDER — ATORVASTATIN CALCIUM 40 MG/1
TABLET, FILM COATED ORAL
Qty: 90 TABLET | Refills: 1 | Status: SHIPPED | OUTPATIENT
Start: 2017-05-22 | End: 2017-11-16

## 2017-05-22 NOTE — TELEPHONE ENCOUNTER
atorvastatin     Last Written Prescription Date: 08/26/16  Last Fill Quantity: 90, # refills: 2  Last Office Visit with [facility] or [facility] prescribing provider: 05/03/17       Lab Results   Component Value Date    CHOL 175 04/04/2017     Lab Results   Component Value Date    HDL 66 04/04/2017     Lab Results   Component Value Date    LDL 92 04/04/2017     Lab Results   Component Value Date    TRIG 86 04/04/2017     Lab Results   Component Value Date    CHOLHDLRATIO 2.6 06/09/2015

## 2017-07-06 ENCOUNTER — OFFICE VISIT (OUTPATIENT)
Dept: INTERNAL MEDICINE | Facility: CLINIC | Age: 67
End: 2017-07-06
Payer: COMMERCIAL

## 2017-07-06 VITALS
BODY MASS INDEX: 28.76 KG/M2 | HEART RATE: 72 BPM | HEIGHT: 62 IN | OXYGEN SATURATION: 99 % | TEMPERATURE: 98.9 F | DIASTOLIC BLOOD PRESSURE: 76 MMHG | WEIGHT: 156.3 LBS | SYSTOLIC BLOOD PRESSURE: 122 MMHG

## 2017-07-06 DIAGNOSIS — I10 BENIGN ESSENTIAL HYPERTENSION: ICD-10-CM

## 2017-07-06 DIAGNOSIS — L30.9 ECZEMA: ICD-10-CM

## 2017-07-06 DIAGNOSIS — L40.9 PSORIASIS: Primary | ICD-10-CM

## 2017-07-06 DIAGNOSIS — K58.0 IRRITABLE BOWEL SYNDROME WITH DIARRHEA: ICD-10-CM

## 2017-07-06 PROCEDURE — 99213 OFFICE O/P EST LOW 20 MIN: CPT | Performed by: INTERNAL MEDICINE

## 2017-07-06 RX ORDER — LISINOPRIL 20 MG/1
TABLET ORAL
Qty: 180 TABLET | Refills: 2 | Status: SHIPPED | OUTPATIENT
Start: 2017-07-06 | End: 2018-01-12

## 2017-07-06 RX ORDER — TRIAMCINOLONE ACETONIDE 1 MG/G
CREAM TOPICAL
Qty: 45 G | Refills: 0 | Status: SHIPPED | OUTPATIENT
Start: 2017-07-06 | End: 2018-02-01

## 2017-07-06 NOTE — NURSING NOTE
"Chief Complaint   Patient presents with     Hemorrhoids       Initial /76  Pulse 72  Temp 98.9  F (37.2  C) (Oral)  Ht 5' 1.5\" (1.562 m)  Wt 156 lb 4.8 oz (70.9 kg)  SpO2 99%  BMI 29.05 kg/m2 Estimated body mass index is 29.05 kg/(m^2) as calculated from the following:    Height as of this encounter: 5' 1.5\" (1.562 m).    Weight as of this encounter: 156 lb 4.8 oz (70.9 kg).  Medication Reconciliation: complete   Yamileth Parikh MA   "

## 2017-07-06 NOTE — TELEPHONE ENCOUNTER
Prescription approved per [facility] Refill Protocol.      Lisinopril       Last Written Prescription Date: 10/10/16  Last Fill Quantity: 180, # refills: 2  Last Office Visit with [facility], [facility] or [facility] prescribing provider: 7/6/17       Potassium   Date Value Ref Range Status   04/04/2017 4.5 3.4 - 5.3 mmol/L Final     Creatinine   Date Value Ref Range Status   04/04/2017 0.87 0.52 - 1.04 mg/dL Final     BP Readings from Last 3 Encounters:   07/06/17 122/76   05/03/17 142/72   04/04/17 122/70
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
## 2017-07-06 NOTE — MR AVS SNAPSHOT
"              After Visit Summary   7/6/2017    Liz Haider    MRN: [id number]           Patient Information     Date Of Birth          1950        Visit Information        Provider Department      7/6/2017 8:20 AM Abundio Burger MD [facility]        Today's Diagnoses     Psoriasis    -  1    Irritable bowel syndrome with diarrhea          Care Instructions    *  SKin irritation around rectal/perinlea area looks like psoriasis.     *  Apply steroid cream (triamcinolone) sparingly once or twice per day as needed to affected area until the skin is better, then stop; REPEAT AS NEEDED .    *  Avoiud \"overcleansing\" the area.    *  Use Preparation H wipes or baby wipes.     *  Consider using Desitin diaper rash ointment as needed for barrier skin protection.      *  Use Imodium 1/2 or 1 tablet per day to help prevent the regular episodes of loose stools.  Titrate to whatever dose it takes to keep the BMs twice per day to every 2 days.            Follow-ups after your visit        Who to contact     If you have questions or need follow up information about today's clinic visit or your schedule please contact [facility] directly at [phone].  Normal or non-critical lab and imaging results will be communicated to you by SideTourhart, letter or phone within 4 business days after the clinic has received the results. If you do not hear from us within 7 days, please contact the clinic through SideTourhart or phone. If you have a critical or abnormal lab result, we will notify you by phone as soon as possible.  Submit refill requests through [email or web address] or call your pharmacy and they will forward the refill request to us. Please allow 3 business days for your refill to be completed.          Additional Information About Your Visit        MyChart Information     [email or web address] gives you secure access to your electronic health record. If you see a primary care provider, you can " "also send messages to your care team and make appointments. If you have questions, please call your primary care clinic.  If you do not have a primary care provider, please call [phone] and they will assist you.        Care EveryWhere ID     This is your Care EveryWhere ID. This could be used by other organizations to access your [facility] medical records  [id number]        Your Vitals Were     Pulse Temperature Height Pulse Oximetry BMI (Body Mass Index)       72 98.9  F (37.2  C) (Oral) 5' 1.5\" (1.562 m) 99% 29.05 kg/m2        Blood Pressure from Last 3 Encounters:   07/06/17 122/76   05/03/17 142/72   04/04/17 122/70    Weight from Last 3 Encounters:   07/06/17 156 lb 4.8 oz (70.9 kg)   05/03/17 158 lb 11.2 oz (72 kg)   04/04/17 157 lb 1.6 oz (71.3 kg)              Today, you had the following     No orders found for display       Primary Care Provider Office Phone # Fax #    Abundio Burger [phone]       [facility] [address] [facility] [address]        Equal Access to Services     RONAN BLANK : Hadii aad ku hadasho Soomaali, waaxda luqadaha, qaybta kaalmada adeegyada, waxay idiin haymaryn babak colvin laedelmira salazar. So [facility] [phone].    ATENCIÓN: Si habla español, tiene a medrano disposición servicios gratuitos de asistencia lingüística. Llame al [phone].    We comply with applicable federal civil rights laws and [place] laws. We do not discriminate on the basis of race, color, national origin, age, disability sex, sexual orientation or gender identity.            Thank you!     Thank you for choosing [facility]  for your care. Our goal is always to provide you with excellent care. Hearing back from our patients is one way we can continue to improve our services. Please take a few minutes to complete the written survey that you may receive in the mail after your visit with us. Thank you!             Your Updated Medication List - " Protect others around you: Learn how to safely use, store and throw away your medicines at www.disposemymeds.org.          This list is accurate as of: 7/6/17  9:03 AM.  Always use your most recent med list.                   Brand Name Dispense Instructions for use Diagnosis    AMBIEN 5 MG tablet   Generic drug:  zolpidem      Take 0.5-1 tablets (2.5-5 mg) by mouth nightly as needed for sleep        amLODIPine 10 MG tablet    NORVASC    90 tablet    TAKE ONE TABLET BY MOUTH ONCE DAILY IN THE EVENING    Essential hypertension       aspirin 81 MG tablet     100    ONE DAILY    Unspecified cardiovascular disease       atenolol 100 MG tablet    TENORMIN    90 tablet    Take 1 tablet (100 mg) by mouth daily    Coronary artery disease with angina pectoris, unspecified vessel or lesion type, unspecified whether native or transplanted heart (H)       atorvastatin 40 MG tablet    LIPITOR    90 tablet    TAKE ONE TABLET BY MOUTH ONCE DAILY AT BEDTIME    Hyperlipidemia LDL goal <100       bimatoprost 0.03 % Soln    LATISSE    1 Bottle    Apply one per day at bedtime only to upper eyelid margin with applicator (not lower lid) as needed    Hypotrichosis of eyelid, unspecified laterality       CALCIUM 1200 PO      Take 2 tablets by mouth.    Routine general medical examination at a health care facility, Unspecified essential hypertension, Other and unspecified hyperlipidemia, Acute myocardial infarction, unspecified site, subsequent episode of care, Sleep disturbance, unspecified, Generalized anxiety disorder, AAA (abdominal aortic aneurysm) (H)       lisinopril 20 MG tablet    PRINIVIL/ZESTRIL    180 tablet    Take 1 tablet (20 mg) by mouth 2 times daily    Benign essential hypertension       LORazepam 0.5 MG tablet    ATIVAN    90 tablet    Take 1 tablet by mouth every 6 hours as needed for anxiety.    Sleep disturbance, unspecified, Acute myocardial infarction, unspecified site, subsequent episode of care, Unspecified  essential hypertension, Other and unspecified hyperlipidemia, Generalized anxiety disorder, Routine general medical examination at a health care facility, AAA (abdominal aortic aneurysm) (H)       Multi-vitamin Tabs tablet   Generic drug:  multivitamin, therapeutic with minerals     100    as directed        omeprazole 20 MG CR capsule    priLOSEC    90 capsule    Take 1 capsule (20 mg) by mouth daily    Gastroesophageal reflux disease without esophagitis       sertraline 100 MG tablet    ZOLOFT     Take 1 tablet (100 mg) by mouth daily Rx'd by psych    Generalized anxiety disorder       spironolactone 25 MG tablet    ALDACTONE    90 tablet    TAKE ONE TABLET BY MOUTH ONCE DAILY IN THE MORNING    Essential hypertension       triamcinolone 0.1 % cream    KENALOG    45 g    Apply sparingly to affected area three times daily for 14 days.    Eczema, unspecified eczema       VITAMIN D PO      Take 1 tablet by mouth

## 2017-07-06 NOTE — PATIENT INSTRUCTIONS
"*  SKin irritation around rectal/perinlea area looks like psoriasis.     *  Apply steroid cream (triamcinolone) sparingly once or twice per day as needed to affected area until the skin is better, then stop; REPEAT AS NEEDED .    *  Avoiud \"overcleansing\" the area.    *  Use Preparation H wipes or baby wipes.     *  Consider using Desitin diaper rash ointment as needed for barrier skin protection.      *  Use Imodium 1/2 or 1 tablet per day to help prevent the regular episodes of loose stools.  Titrate to whatever dose it takes to keep the BMs twice per day to every 2 days.    "

## 2017-08-17 DIAGNOSIS — H02.729 HYPOTRICHOSIS OF EYELID, UNSPECIFIED LATERALITY: ICD-10-CM

## 2017-08-17 RX ORDER — BIMATOPROST 3 UG/ML
SOLUTION TOPICAL
Qty: 3 ML | Refills: 1 | Status: SHIPPED | OUTPATIENT
Start: 2017-08-17 | End: 2018-02-01

## 2017-08-17 NOTE — TELEPHONE ENCOUNTER
bimatoprost (LATISSE) 0.03 % SOLN      Last Written Prescription Date:  4/4/17  Last Fill Quantity: 1 bottle,   # refills: 1  Last Office Visit with [facility], [facility] or [facility] prescribing provider: 7/6/17  Future Office visit:       Routing refill request to provider for review/approval because:  Drug not on the [facility], [facility] or  Health refill protocol or controlled substance

## 2017-09-21 DIAGNOSIS — I10 BENIGN ESSENTIAL HYPERTENSION: Primary | ICD-10-CM

## 2017-09-25 RX ORDER — ATENOLOL 50 MG/1
TABLET ORAL
Qty: 180 TABLET | Refills: 2 | Status: SHIPPED | OUTPATIENT
Start: 2017-09-25 | End: 2017-09-26

## 2017-09-25 NOTE — TELEPHONE ENCOUNTER
atenolol (TENORMIN) 100 MG tablet      Last Written Prescription Date: 12/22/16  Last Fill Quantity: 90, # refills: 2    Last Office Visit with [facility], [facility] or [facility] prescribing provider:  7/6/17   Future Office Visit:        BP Readings from Last 3 Encounters:   07/06/17 122/76   05/03/17 142/72   04/04/17 122/70

## 2017-09-26 RX ORDER — METOPROLOL SUCCINATE 100 MG/1
100 TABLET, EXTENDED RELEASE ORAL DAILY
Qty: 180 TABLET | Refills: 1 | Status: SHIPPED | OUTPATIENT
Start: 2017-09-26 | End: 2018-01-12

## 2017-09-26 NOTE — TELEPHONE ENCOUNTER
There is a nationwide shortage of atenolol due to production problems at the main  of atenolol.   Change to Metoprolol succinate 100 mg once per day.    This is an approximately equivalent dose to the prior atenolol dose.   Prescription(s) sent electronically to specified pharmacy.

## 2017-11-16 DIAGNOSIS — E78.5 HYPERLIPIDEMIA LDL GOAL <100: ICD-10-CM

## 2017-11-16 RX ORDER — ATORVASTATIN CALCIUM 40 MG/1
TABLET, FILM COATED ORAL
Qty: 90 TABLET | Refills: 1 | Status: SHIPPED | OUTPATIENT
Start: 2017-11-16 | End: 2018-01-12

## 2018-01-12 ENCOUNTER — OFFICE VISIT (OUTPATIENT)
Dept: INTERNAL MEDICINE | Facility: CLINIC | Age: 68
End: 2018-01-12
Payer: COMMERCIAL

## 2018-01-12 VITALS
OXYGEN SATURATION: 99 % | SYSTOLIC BLOOD PRESSURE: 116 MMHG | TEMPERATURE: 98.2 F | BODY MASS INDEX: 28.82 KG/M2 | DIASTOLIC BLOOD PRESSURE: 74 MMHG | HEIGHT: 62 IN | HEART RATE: 74 BPM | WEIGHT: 156.6 LBS

## 2018-01-12 DIAGNOSIS — E78.5 HYPERLIPIDEMIA WITH TARGET LDL LESS THAN 100: Primary | ICD-10-CM

## 2018-01-12 DIAGNOSIS — E78.5 HYPERLIPIDEMIA LDL GOAL <100: ICD-10-CM

## 2018-01-12 DIAGNOSIS — K21.9 GASTROESOPHAGEAL REFLUX DISEASE WITHOUT ESOPHAGITIS: ICD-10-CM

## 2018-01-12 DIAGNOSIS — L40.9 PSORIASIS: ICD-10-CM

## 2018-01-12 DIAGNOSIS — I10 BENIGN ESSENTIAL HYPERTENSION: ICD-10-CM

## 2018-01-12 DIAGNOSIS — I10 ESSENTIAL HYPERTENSION: ICD-10-CM

## 2018-01-12 DIAGNOSIS — Z13.29 SCREENING FOR THYROID DISORDER: ICD-10-CM

## 2018-01-12 DIAGNOSIS — I25.119 CORONARY ARTERY DISEASE WITH ANGINA PECTORIS, UNSPECIFIED VESSEL OR LESION TYPE, UNSPECIFIED WHETHER NATIVE OR TRANSPLANTED HEART (H): ICD-10-CM

## 2018-01-12 LAB
ALBUMIN SERPL-MCNC: 3.9 G/DL (ref 3.4–5)
ALP SERPL-CCNC: 57 U/L (ref 40–150)
ALT SERPL W P-5'-P-CCNC: 24 U/L (ref 0–50)
ANION GAP SERPL CALCULATED.3IONS-SCNC: 8 MMOL/L (ref 3–14)
AST SERPL W P-5'-P-CCNC: 15 U/L (ref 0–45)
BILIRUB SERPL-MCNC: 0.4 MG/DL (ref 0.2–1.3)
BUN SERPL-MCNC: 19 MG/DL (ref 7–30)
CALCIUM SERPL-MCNC: 8.9 MG/DL (ref 8.5–10.1)
CHLORIDE SERPL-SCNC: 100 MMOL/L (ref 94–109)
CHOLEST SERPL-MCNC: 163 MG/DL
CO2 SERPL-SCNC: 28 MMOL/L (ref 20–32)
CREAT SERPL-MCNC: 0.92 MG/DL (ref 0.52–1.04)
ERYTHROCYTE [DISTWIDTH] IN BLOOD BY AUTOMATED COUNT: 13.4 % (ref 10–15)
GFR SERPL CREATININE-BSD FRML MDRD: 60 ML/MIN/1.7M2
GLUCOSE SERPL-MCNC: 100 MG/DL (ref 70–99)
HCT VFR BLD AUTO: 39.8 % (ref 35–47)
HDLC SERPL-MCNC: 77 MG/DL
HGB BLD-MCNC: 12.8 G/DL (ref 11.7–15.7)
LDLC SERPL CALC-MCNC: 72 MG/DL
MCH RBC QN AUTO: 30.2 PG (ref 26.5–33)
MCHC RBC AUTO-ENTMCNC: 32.2 G/DL (ref 31.5–36.5)
MCV RBC AUTO: 94 FL (ref 78–100)
NONHDLC SERPL-MCNC: 86 MG/DL
PLATELET # BLD AUTO: 235 10E9/L (ref 150–450)
POTASSIUM SERPL-SCNC: 4.3 MMOL/L (ref 3.4–5.3)
PROT SERPL-MCNC: 6.9 G/DL (ref 6.8–8.8)
RBC # BLD AUTO: 4.24 10E12/L (ref 3.8–5.2)
SODIUM SERPL-SCNC: 136 MMOL/L (ref 133–144)
TRIGL SERPL-MCNC: 72 MG/DL
TSH SERPL DL<=0.005 MIU/L-ACNC: 3.61 MU/L (ref 0.4–4)
WBC # BLD AUTO: 7.8 10E9/L (ref 4–11)

## 2018-01-12 PROCEDURE — 85027 COMPLETE CBC AUTOMATED: CPT | Performed by: INTERNAL MEDICINE

## 2018-01-12 PROCEDURE — 80053 COMPREHEN METABOLIC PANEL: CPT | Performed by: INTERNAL MEDICINE

## 2018-01-12 PROCEDURE — 36415 COLL VENOUS BLD VENIPUNCTURE: CPT | Performed by: INTERNAL MEDICINE

## 2018-01-12 PROCEDURE — 99214 OFFICE O/P EST MOD 30 MIN: CPT | Performed by: INTERNAL MEDICINE

## 2018-01-12 PROCEDURE — 80061 LIPID PANEL: CPT | Performed by: INTERNAL MEDICINE

## 2018-01-12 PROCEDURE — 84443 ASSAY THYROID STIM HORMONE: CPT | Performed by: INTERNAL MEDICINE

## 2018-01-12 RX ORDER — TRIAMCINOLONE ACETONIDE 1 MG/ML
LOTION TOPICAL
Qty: 60 ML | Refills: 2 | Status: SHIPPED | OUTPATIENT
Start: 2018-01-12 | End: 2019-09-03

## 2018-01-12 RX ORDER — ATORVASTATIN CALCIUM 40 MG/1
TABLET, FILM COATED ORAL
Qty: 90 TABLET | Refills: 1 | Status: SHIPPED | OUTPATIENT
Start: 2018-01-12 | End: 2018-11-15

## 2018-01-12 RX ORDER — LISINOPRIL 20 MG/1
20 TABLET ORAL 2 TIMES DAILY
Qty: 180 TABLET | Refills: 1 | Status: SHIPPED | OUTPATIENT
Start: 2018-01-12 | End: 2018-09-27

## 2018-01-12 RX ORDER — AMLODIPINE BESYLATE 10 MG/1
TABLET ORAL
Qty: 90 TABLET | Refills: 3 | Status: SHIPPED | OUTPATIENT
Start: 2018-01-12 | End: 2019-08-19

## 2018-01-12 RX ORDER — METOPROLOL SUCCINATE 100 MG/1
100 TABLET, EXTENDED RELEASE ORAL DAILY
Qty: 180 TABLET | Refills: 1 | Status: SHIPPED | OUTPATIENT
Start: 2018-01-12 | End: 2018-04-17

## 2018-01-12 NOTE — MR AVS SNAPSHOT
"              After Visit Summary   1/12/2018    Liz Haider    MRN: [id number]           Patient Information     Date Of Birth          1950        Visit Information        Provider Department      1/12/2018 8:20 AM Abundio Burger MD [facility]        Today's Diagnoses     Hyperlipidemia with target LDL less than 100    -  1    Hyperlipidemia LDL goal <100        Benign essential hypertension        Essential hypertension        Gastroesophageal reflux disease without esophagitis        Coronary artery disease with prior NSTEMI (HCC)        Screening for thyroid disorder        Psoriasis          Care Instructions    *  Continue all medications at the same doses.  Contact your usual pharmacy if you need refills.     *  Triamcinolone lotion to the areas on the scalp.      --Apply sparingly once or twice per day as needed to affected area until the skin is better, then stop; REPEAT AS NEEDED     *  Return to see me in 6 months, sooner if needed.  Call [phone] to schedule this appointment.       SHINGLES VACCINE:     I would recommend that you consider getting a \"shingles vaccine\".  The shingles vaccine does not stop you from getting shingles, but it decreases the intensity of the event, the duration of the event, and decreases the painful nerve condition that results     There are two options available:     --Shingrix (approved late 2017), 2 shots, 2-6 months apart    OR   --Zostavax, one shot    --Based on the available data, the Shingrix vaccine has superior benefit and should be considered even if you have had the Zostavax vaccine before.      --Contact your insurance provider and ask them if either shingles vaccine is covered and is so, how much it will cost you.  Usually this will be cheaper to get in a pharmacy given by the pharmacist.    --Regardless of the coverage, I would recommend that you consider the vaccine since shingles is very painful, just ask anyone " "who has ever had it.               5 GOALS TO PREVENT VASCULAR DISEASE:     1.  Aggressive blood pressure control, under 130/80 ideally.  Using medications if needed.    Your blood pressure is under good control    BP Readings from Last 4 Encounters:   01/12/18 116/74   07/06/17 122/76   05/03/17 142/72   04/04/17 122/70       2.  Aggressive LDL cholesterol (\"bad cholesterol\") lowering as indicated.    Your goal is an LDL under 130 for sure, preferably under 100.  (If you have diabetes or previous vascular disease, the the LDL goals would be under 100 for sure, preferably under 70.)    New guidelines identify four high-risk groups who could benefit from statins:   *people with pre-existing heart disease, such as those who have had a heart attack;   *people ages 40 to 75 who have diabetes of any type  *patients ages 40 to 75 with at least a 7.5% risk of developing cardiovascular disease over the next decade, according to a formula described in the guidelines  *patients with the sort of super-high cholesterol that sometimes runs in families, as evidenced by an LDL of 190 milligrams per deciliter or higher    Your cholesterol levels are well controlled.    Recent Labs   Lab Test  04/04/17   0933  07/21/16   1056  06/09/15   1157  10/07/14   1002   CHOL  175  188  179  198   HDL  66  69  69  85   LDL  92  99  92  98   TRIG  86  100  89  74   CHOLHDLRATIO   --    --   2.6  2.3       3.  Aggressive diabetic prevention, screening and/or management.      You do not have diabetes as of the most recent blood tests.     4.  No smoking    5.  Consider taking low dose aspirin (81 mg) tablet once per day over the age of 50, every day unless there is a specific reason that you cannot take aspirin (such as side effect, allergy, or you are on another \"blood thinner\").        --Based on your current cardiac risk factors, you should take Aspirin 81 mg once per day if you are over 50 years of age.                 Follow-ups after your " "visit        Who to contact     If you have questions or need follow up information about today's clinic visit or your schedule please contact [facility] directly at [phone].  Normal or non-critical lab and imaging results will be communicated to you by MyChart, letter or phone within 4 business days after the clinic has received the results. If you do not hear from us within 7 days, please contact the clinic through MyChart or phone. If you have a critical or abnormal lab result, we will notify you by phone as soon as possible.  Submit refill requests through [facility] or call your pharmacy and they will forward the refill request to us. Please allow 3 business days for your refill to be completed.          Additional Information About Your Visit        NameMediahart Information     [facility] gives you secure access to your electronic health record. If you see a primary care provider, you can also send messages to your care team and make appointments. If you have questions, please call your primary care clinic.  If you do not have a primary care provider, please call [phone] and they will assist you.        Care EveryWhere ID     This is your Care EveryWhere ID. This could be used by other organizations to access your [place] medical records  [id number]        Your Vitals Were     Pulse Temperature Height Pulse Oximetry BMI (Body Mass Index)       74 98.2  F (36.8  C) (Oral) 5' 1.5\" (1.562 m) 99% 29.11 kg/m2        Blood Pressure from Last 3 Encounters:   01/12/18 116/74   07/06/17 122/76   05/03/17 142/72    Weight from Last 3 Encounters:   01/12/18 156 lb 9.6 oz (71 kg)   07/06/17 156 lb 4.8 oz (70.9 kg)   05/03/17 158 lb 11.2 oz (72 kg)              We Performed the Following     **CBC with platelets FUTURE 6mo     **Comprehensive metabolic panel FUTURE 6mo     **TSH with free T4 reflex FUTURE 6mo     Lipid panel reflex to direct LDL Fasting          Today's Medication Changes        "   These changes are accurate as of: 1/12/18  9:10 AM.  If you have any questions, ask your nurse or doctor.               These medicines have changed or have updated prescriptions.        Dose/Directions    amLODIPine 10 MG tablet   Commonly known as:  NORVASC   This may have changed:  See the new instructions.   Used for:  Essential hypertension   Changed by:  Abundio Burger MD        TAKE ONE TABLET BY MOUTH ONCE DAILY IN THE EVENING   Quantity:  90 tablet   Refills:  3       atorvastatin 40 MG tablet   Commonly known as:  LIPITOR   This may have changed:  See the new instructions.   Used for:  Hyperlipidemia LDL goal <100   Changed by:  Abundio Burger MD        TAKE ONE TABLET BY MOUTH ONCE DAILY AT BEDTIME   Quantity:  90 tablet   Refills:  1       lisinopril 20 MG tablet   Commonly known as:  PRINIVIL/ZESTRIL   This may have changed:  See the new instructions.   Used for:  Benign essential hypertension   Changed by:  Abundio Burger MD        Dose:  20 mg   Take 1 tablet (20 mg) by mouth 2 times daily   Quantity:  180 tablet   Refills:  1       * triamcinolone 0.1 % cream   Commonly known as:  KENALOG   This may have changed:  Another medication with the same name was added. Make sure you understand how and when to take each.   Used for:  Eczema   Changed by:  Ria Douglas PA-C        APPLY SPARINGLY TO AFFECTED AREA THREE TIMES DAILY FOR 14 DAYS   Quantity:  45 g   Refills:  0       * triamcinolone 0.1 % lotion   Commonly known as:  KENALOG   This may have changed:  You were already taking a medication with the same name, and this prescription was added. Make sure you understand how and when to take each.   Used for:  Psoriasis   Changed by:  Abundio Burger MD        Apply sparingly once or twice per day as needed to affected area until the skin is better, then stop; REPEAT AS NEEDED   Quantity:  60 mL   Refills:  2       * Notice:  This list has 2  medication(s) that are the same as other medications prescribed for you. Read the directions carefully, and ask your doctor or other care provider to review them with you.      Stop taking these medicines if you haven't already. Please contact your care team if you have questions.     atenolol 100 MG tablet   Commonly known as:  TENORMIN   Stopped by:  Abundio Burger MD                Where to get your medicines      These medications were sent to [facility] [address] [place] [address] [address] [facility]  [address] [facility] [address]     Phone:  [phone]     amLODIPine 10 MG tablet    atorvastatin 40 MG tablet    lisinopril 20 MG tablet    metoprolol succinate 100 MG 24 hr tablet    omeprazole 20 MG CR capsule    triamcinolone 0.1 % lotion                Primary Care Provider Office Phone # Fax #    Abundio Burger [phone]       [address]  [facility] [address]        Equal Access to Services     [facility]: Hadii aad ku hadasho Soomaali, waaxda luqadaha, qaybta kaalmada adeegyada, waxay idiin hayaan adeeg marinaarashaila laedelmira . So [facility] [phone].    ATENCIÓN: Si habla español, tiene a medrano disposición servicios gratuitos de asistencia lingüística. [facility] [phone].    We comply with applicable federal civil rights laws and [place] laws. We do not discriminate on the basis of race, color, national origin, age, disability, sex, sexual orientation, or gender identity.            Thank you!     Thank you for choosing [facility]  for your care. Our goal is always to provide you with excellent care. Hearing back from our patients is one way we can continue to improve our services. Please take a few minutes to complete the written survey that you may receive in the mail after your visit with us. Thank you!             Your Updated Medication List - Protect others around you: Learn how to safely use, store and throw away  your medicines at www.disposemymeds.org.          This list is accurate as of: 1/12/18  9:10 AM.  Always use your most recent med list.                   Brand Name Dispense Instructions for use Diagnosis    AMBIEN 5 MG tablet   Generic drug:  zolpidem      Take 0.5-1 tablets (2.5-5 mg) by mouth nightly as needed for sleep        amLODIPine 10 MG tablet    NORVASC    90 tablet    TAKE ONE TABLET BY MOUTH ONCE DAILY IN THE EVENING    Essential hypertension       aspirin 81 MG tablet     100    ONE DAILY    Unspecified cardiovascular disease       atorvastatin 40 MG tablet    LIPITOR    90 tablet    TAKE ONE TABLET BY MOUTH ONCE DAILY AT BEDTIME    Hyperlipidemia LDL goal <100       bimatoprost 0.03 % Soln    latisse    3 mL    APPLY ONCE PER DAY AT BEDTIME ONLY TO UPPER EYELID MARGIN WITH APPLICATOR (NOT LOWER LID) AS NEEDED    Hypotrichosis of eyelid, unspecified laterality       CALCIUM 1200 PO      Take 2 tablets by mouth.    Routine general medical examination at a health care facility, Unspecified essential hypertension, Other and unspecified hyperlipidemia, Acute myocardial infarction, unspecified site, subsequent episode of care, Sleep disturbance, unspecified, Generalized anxiety disorder, AAA (abdominal aortic aneurysm) (H)       lisinopril 20 MG tablet    PRINIVIL/ZESTRIL    180 tablet    Take 1 tablet (20 mg) by mouth 2 times daily    Benign essential hypertension       LORazepam 0.5 MG tablet    ATIVAN    90 tablet    Take 1 tablet by mouth every 6 hours as needed for anxiety.    Sleep disturbance, unspecified, Acute myocardial infarction, unspecified site, subsequent episode of care, Unspecified essential hypertension, Other and unspecified hyperlipidemia, Generalized anxiety disorder, Routine general medical examination at a health care facility, AAA (abdominal aortic aneurysm) (H)       metoprolol succinate 100 MG 24 hr tablet    TOPROL-XL    180 tablet    Take 1 tablet (100 mg) by mouth daily     Benign essential hypertension       Multi-vitamin Tabs tablet   Generic drug:  multivitamin, therapeutic with minerals     100    as directed        omeprazole 20 MG CR capsule    priLOSEC    90 capsule    Take 1 capsule (20 mg) by mouth daily    Gastroesophageal reflux disease without esophagitis       sertraline 100 MG tablet    ZOLOFT     Take 1 tablet (100 mg) by mouth daily Rx'd by psych    Generalized anxiety disorder       spironolactone 25 MG tablet    ALDACTONE    90 tablet    TAKE ONE TABLET BY MOUTH ONCE DAILY IN THE MORNING    Essential hypertension       * triamcinolone 0.1 % cream    KENALOG    45 g    APPLY SPARINGLY TO AFFECTED AREA THREE TIMES DAILY FOR 14 DAYS    Eczema       * triamcinolone 0.1 % lotion    KENALOG    60 mL    Apply sparingly once or twice per day as needed to affected area until the skin is better, then stop; REPEAT AS NEEDED    Psoriasis       VITAMIN D PO      Take 1 tablet by mouth        * Notice:  This list has 2 medication(s) that are the same as other medications prescribed for you. Read the directions carefully, and ask your doctor or other care provider to review them with you.

## 2018-01-12 NOTE — PATIENT INSTRUCTIONS
"*  Continue all medications at the same doses.  Contact your usual pharmacy if you need refills.     *  Triamcinolone lotion to the areas on the scalp.      --Apply sparingly once or twice per day as needed to affected area until the skin is better, then stop; REPEAT AS NEEDED     *  Return to see me in 6 months, sooner if needed.  Call [phone] to schedule this appointment.       SHINGLES VACCINE:     I would recommend that you consider getting a \"shingles vaccine\".  The shingles vaccine does not stop you from getting shingles, but it decreases the intensity of the event, the duration of the event, and decreases the painful nerve condition that results     There are two options available:     --Shingrix (approved late 2017), 2 shots, 2-6 months apart    OR   --Zostavax, one shot    --Based on the available data, the Shingrix vaccine has superior benefit and should be considered even if you have had the Zostavax vaccine before.      --Contact your insurance provider and ask them if either shingles vaccine is covered and is so, how much it will cost you.  Usually this will be cheaper to get in a pharmacy given by the pharmacist.    --Regardless of the coverage, I would recommend that you consider the vaccine since shingles is very painful, just ask anyone who has ever had it.               5 GOALS TO PREVENT VASCULAR DISEASE:     1.  Aggressive blood pressure control, under 130/80 ideally.  Using medications if needed.    Your blood pressure is under good control    BP Readings from Last 4 Encounters:   01/12/18 116/74   07/06/17 122/76   05/03/17 142/72   04/04/17 122/70       2.  Aggressive LDL cholesterol (\"bad cholesterol\") lowering as indicated.    Your goal is an LDL under 130 for sure, preferably under 100.  (If you have diabetes or previous vascular disease, the the LDL goals would be under 100 for sure, preferably under 70.)    New guidelines identify four high-risk groups who could benefit from statins: " "  *people with pre-existing heart disease, such as those who have had a heart attack;   *people ages 40 to 75 who have diabetes of any type  *patients ages 40 to 75 with at least a 7.5% risk of developing cardiovascular disease over the next decade, according to a formula described in the guidelines  *patients with the sort of super-high cholesterol that sometimes runs in families, as evidenced by an LDL of 190 milligrams per deciliter or higher    Your cholesterol levels are well controlled.    Recent Labs   Lab Test  04/04/17   0933  07/21/16   1056  06/09/15   1157  10/07/14   1002   CHOL  175  188  179  198   HDL  66  69  69  85   LDL  92  99  92  98   TRIG  86  100  89  74   CHOLHDLRATIO   --    --   2.6  2.3       3.  Aggressive diabetic prevention, screening and/or management.      You do not have diabetes as of the most recent blood tests.     4.  No smoking    5.  Consider taking low dose aspirin (81 mg) tablet once per day over the age of 50, every day unless there is a specific reason that you cannot take aspirin (such as side effect, allergy, or you are on another \"blood thinner\").        --Based on your current cardiac risk factors, you should take Aspirin 81 mg once per day if you are over 50 years of age.         "
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
## 2018-01-13 DIAGNOSIS — L30.9 ECZEMA: ICD-10-CM

## 2018-01-14 NOTE — TELEPHONE ENCOUNTER
"Requested Prescriptions   Pending Prescriptions Disp Refills     triamcinolone (KENALOG) 0.1 % cream [Pharmacy Med Name: TRIAMCINOLON 0.1%   CRE]  Last Written Prescription Date:  1/12/18  Last Fill Quantity: 60 ML,  # refills: 2   Last Office Visit with [facility] or [facility] prescribing provider:  1/12/18   Future Office Visit:      0     Sig: APPLY SPARINGLY TO AFFECTED AREA THREE TIMES DAILY FOR 14 DAYS    Topical Steroid Protocol Passed    1/13/2018 11:58 AM       Passed - Patient is age 6 or older       Passed - Authorizing prescriber's most recent note related to this medication read.       Passed - High potency steroid not ordered       Passed - Recent or future visit with authorizing provider's specialty    Patient had office visit in the last year or has a visit in the next 30 days with authorizing provider.  See \"Patient Info\" tab in inbasket, or \"Choose Columns\" in Meds & Orders section of the refill encounter.                 "

## 2018-01-16 RX ORDER — TRIAMCINOLONE ACETONIDE 1 MG/G
CREAM TOPICAL
Refills: 0 | OUTPATIENT
Start: 2018-01-16

## 2018-02-01 DIAGNOSIS — L30.9 ECZEMA: ICD-10-CM

## 2018-02-01 DIAGNOSIS — H02.729 HYPOTRICHOSIS OF EYELID, UNSPECIFIED LATERALITY: ICD-10-CM

## 2018-02-01 RX ORDER — TRIAMCINOLONE ACETONIDE 1 MG/G
CREAM TOPICAL
Qty: 45 G | Refills: 0 | Status: SHIPPED | OUTPATIENT
Start: 2018-02-01 | End: 2018-03-28

## 2018-02-01 NOTE — TELEPHONE ENCOUNTER
Requested Prescriptions   Pending Prescriptions Disp Refills     bimatoprost (LATISSE) 0.03 % SOLN [Pharmacy Med Name: BIMATOPROST 0.03%   SOL]  1     Sig: APPLY ONCE DAILY AT BEDTIME TO UPPER EYELID MARGIN WITH APPLICATOR (NOT LOWER EYELID) AS NEEDED.    There is no refill protocol information for this order      Last Written Prescription Date:  08/17/17  Last Fill Quantity: 3mL,  # refills: 1   Last Office Visit with G provider:  01/12/18   Future Office Visit:   0

## 2018-02-02 RX ORDER — BIMATOPROST 3 UG/ML
SOLUTION TOPICAL
Qty: 3 ML | Refills: 1 | Status: SHIPPED | OUTPATIENT
Start: 2018-02-02 | End: 2019-09-03

## 2018-02-26 ENCOUNTER — OFFICE VISIT (OUTPATIENT)
Dept: DERMATOLOGY | Facility: CLINIC | Age: 68
End: 2018-02-26
Payer: COMMERCIAL

## 2018-02-26 VITALS — DIASTOLIC BLOOD PRESSURE: 75 MMHG | OXYGEN SATURATION: 99 % | SYSTOLIC BLOOD PRESSURE: 122 MMHG | HEART RATE: 67 BPM

## 2018-02-26 DIAGNOSIS — D23.9 DERMAL NEVUS: ICD-10-CM

## 2018-02-26 DIAGNOSIS — L21.9 DERMATITIS, SEBORRHEIC: ICD-10-CM

## 2018-02-26 DIAGNOSIS — L85.3 XEROSIS OF SKIN: ICD-10-CM

## 2018-02-26 DIAGNOSIS — L30.9 DERMATITIS: Primary | ICD-10-CM

## 2018-02-26 PROCEDURE — 99214 OFFICE O/P EST MOD 30 MIN: CPT | Performed by: PHYSICIAN ASSISTANT

## 2018-02-26 RX ORDER — KETOCONAZOLE 20 MG/ML
SHAMPOO TOPICAL
Qty: 120 ML | Refills: 6 | Status: SHIPPED | OUTPATIENT
Start: 2018-02-26 | End: 2019-09-03

## 2018-02-26 RX ORDER — HYDROCORTISONE VALERATE CREAM 2 MG/G
CREAM TOPICAL
Qty: 45 G | Refills: 1 | Status: SHIPPED | OUTPATIENT
Start: 2018-02-26 | End: 2018-04-17

## 2018-02-26 RX ORDER — FLUOCINONIDE TOPICAL SOLUTION USP, 0.05% 0.5 MG/ML
SOLUTION TOPICAL
Qty: 60 ML | Refills: 0 | Status: SHIPPED | OUTPATIENT
Start: 2018-02-26 | End: 2018-03-28
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
## 2018-02-26 NOTE — MR AVS SNAPSHOT
After Visit Summary   2/26/2018    Liz Haider    MRN: [id number]           Patient Information     Date Of Birth          1950        Visit Information        Provider Department      2/26/2018 3:00 PM Raina Diego PA-C [facility]        Today's Diagnoses     Dermatitis    -  1    Dermatitis, seborrheic          Care Instructions    Proper skin care from [facility]:    -Eliminate harsh soaps as they strip the natural oils from the skin, often resulting in dry itchy skin ( i.e. Dial, Zest, Nepali Spring)  -Use mild soaps such as Cetaphil or Dove Sensitive Skin in the shower. You do not need to use soap on arms, legs, and trunk every time you shower unless visibly soiled.   -Avoid hot or cold showers.  -After showering, lightly dry off and apply moisturizing within 2-3 minutes. This will help trap moisture in the skin.   -Aggressive use of a moisturizer at least 1-2 times a day to the entire body (including -Vanicream, Cetaphil, Aquaphor or Cerave) and moisturize hands after every washing.  -We recommend using moisturizers that come in a tub that needs to be scooped out, not a pump. This has more of an oil base. It will hold moisture in your skin much better than a water base moisturizer. The above recommended are non-pore clogging.    **Can purchase Sarna sensitive and use a few times a day to body. Does not replace using thick moisturizing cream 2x a day.     Underarms  Apply Hydrocortison 2x a day for up to one week. Take a week off.       Scalp  Wash hair daily with nizoral   Apply lidex 2x a day    Follow up in 4 weeks    Get biopsy results for spot on nose.                   Follow-ups after your visit        Who to contact     If you have questions or need follow up information about today's clinic visit or your schedule please contact [facility] directly at [phone].  Normal or non-critical lab and imaging results  will be communicated to you by [employer], letter or phone within 4 business days after the clinic has received the results. If you do not hear from us within 7 days, please contact the clinic through E96 or phone. If you have a critical or abnormal lab result, we will notify you by phone as soon as possible.  Submit refill requests through E96 or call your pharmacy and they will forward the refill request to us. Please allow 3 business days for your refill to be completed.          Additional Information About Your Visit        E96 Information     E96 gives you secure access to your electronic health record. If you see a primary care provider, you can also send messages to your care team and make appointments. If you have questions, please call your primary care clinic.  If you do not have a primary care provider, please call [phone] and they will assist you.        Care EveryWhere ID     This is your Care EveryWhere ID. This could be used by other organizations to access your [place] medical records  [id number]        Your Vitals Were     Pulse Pulse Oximetry                67 99%           Blood Pressure from Last 3 Encounters:   02/26/18 122/75   01/12/18 116/74   07/06/17 122/76    Weight from Last 3 Encounters:   01/12/18 71 kg (156 lb 9.6 oz)   07/06/17 70.9 kg (156 lb 4.8 oz)   05/03/17 72 kg (158 lb 11.2 oz)              Today, you had the following     No orders found for display         Today's Medication Changes          These changes are accurate as of 2/26/18  3:31 PM.  If you have any questions, ask your nurse or doctor.               Start taking these medicines.        Dose/Directions    fluocinonide 0.05 % solution   Commonly known as:  LIDEX   Used for:  Dermatitis, seborrheic   Started by:  Raina Diego PA-C        Apply to scalp and top of ears BID for 3-4 weeks. Do not use on face or body folds.   Quantity:  60 mL   Refills:  0       hydrocortisone 0.2 % cream    Commonly known as:  WESTCORT   Used for:  Dermatitis   Started by:  Raina Diego PA-C        Apply a thin layer to underarms 2x a day for up to 7 days. Take 7 days off.   Quantity:  45 g   Refills:  1       ketoconazole 2 % shampoo   Commonly known as:  NIZORAL   Used for:  Dermatitis, seborrheic   Started by:  Raina Diego PA-C        Wash hair daily. Lather and let sit for a few minutes on scalp and ears. Rinse.   Quantity:  120 mL   Refills:  6            Where to get your medicines      These medications were sent to [facility] [address] [place] [address] [address] [facility]  [address] [facility] [address]     Phone:  [phone]     fluocinonide 0.05 % solution    hydrocortisone 0.2 % cream    ketoconazole 2 % shampoo                Primary Care Provider Office Phone # Fax #    Abundio Burger [phone]       [address] [facility] [address]        Equal Access to Services     RONAN BLANK AH: Hadii aad ku hadasho Soomaali, waaxda luqadaha, qaybta kaalmada adeegyada, waxay idiin hayaan babak tenorio . So [facility] [phone].    ATENCIÓN: Si habla español, tiene a medrano disposición servicios gratuitos de asistencia lingüística. Llame al [phone].    We comply with applicable federal civil rights laws and [place] laws. We do not discriminate on the basis of race, color, national origin, age, disability, sex, sexual orientation, or gender identity.            Thank you!     Thank you for choosing [facility]  for your care. Our goal is always to provide you with excellent care. Hearing back from our patients is one way we can continue to improve our services. Please take a few minutes to complete the written survey that you may receive in the mail after your visit with us. Thank you!             Your Updated Medication List - Protect others around you: Learn how to safely use, store and throw away your  medicines at www.disposemymeds.org.          This list is accurate as of 2/26/18  3:31 PM.  Always use your most recent med list.                   Brand Name Dispense Instructions for use Diagnosis    AMBIEN 5 MG tablet   Generic drug:  zolpidem      Take 0.5-1 tablets (2.5-5 mg) by mouth nightly as needed for sleep        amLODIPine 10 MG tablet    NORVASC    90 tablet    TAKE ONE TABLET BY MOUTH ONCE DAILY IN THE EVENING    Essential hypertension       aspirin 81 MG tablet     100    ONE DAILY    Unspecified cardiovascular disease       atorvastatin 40 MG tablet    LIPITOR    90 tablet    TAKE ONE TABLET BY MOUTH ONCE DAILY AT BEDTIME    Hyperlipidemia LDL goal <100       bimatoprost 0.03 % Soln    latisse    3 mL    APPLY ONCE DAILY AT BEDTIME TO UPPER EYELID MARGIN WITH APPLICATOR (NOT LOWER EYELID) AS NEEDED.    Hypotrichosis of eyelid, unspecified laterality       CALCIUM 1200 PO      Take 2 tablets by mouth.    Routine general medical examination at a health care facility, Unspecified essential hypertension, Other and unspecified hyperlipidemia, Acute myocardial infarction, unspecified site, subsequent episode of care, Sleep disturbance, unspecified, Generalized anxiety disorder, AAA (abdominal aortic aneurysm) (H)       fluocinonide 0.05 % solution    LIDEX    60 mL    Apply to scalp and top of ears BID for 3-4 weeks. Do not use on face or body folds.    Dermatitis, seborrheic       hydrocortisone 0.2 % cream    WESTCORT    45 g    Apply a thin layer to underarms 2x a day for up to 7 days. Take 7 days off.    Dermatitis       ketoconazole 2 % shampoo    NIZORAL    120 mL    Wash hair daily. Lather and let sit for a few minutes on scalp and ears. Rinse.    Dermatitis, seborrheic       lisinopril 20 MG tablet    PRINIVIL/ZESTRIL    180 tablet    Take 1 tablet (20 mg) by mouth 2 times daily    Benign essential hypertension       LORazepam 0.5 MG tablet    ATIVAN    90 tablet    Take 1 tablet by mouth every 6  hours as needed for anxiety.    Sleep disturbance, unspecified, Acute myocardial infarction, unspecified site, subsequent episode of care, Unspecified essential hypertension, Other and unspecified hyperlipidemia, Generalized anxiety disorder, Routine general medical examination at a health care facility, AAA (abdominal aortic aneurysm) (H)       metoprolol succinate 100 MG 24 hr tablet    TOPROL-XL    180 tablet    Take 1 tablet (100 mg) by mouth daily    Benign essential hypertension       Multi-vitamin Tabs tablet   Generic drug:  multivitamin, therapeutic with minerals     100    as directed        omeprazole 20 MG CR capsule    priLOSEC    90 capsule    Take 1 capsule (20 mg) by mouth daily    Gastroesophageal reflux disease without esophagitis       sertraline 100 MG tablet    ZOLOFT     Take 1 tablet (100 mg) by mouth daily Rx'd by psych    Generalized anxiety disorder       spironolactone 25 MG tablet    ALDACTONE    90 tablet    TAKE ONE TABLET BY MOUTH ONCE DAILY IN THE MORNING    Essential hypertension       * triamcinolone 0.1 % lotion    KENALOG    60 mL    Apply sparingly once or twice per day as needed to affected area until the skin is better, then stop; REPEAT AS NEEDED    Psoriasis       * triamcinolone 0.1 % cream    KENALOG    45 g    APPLY SPARINGLY TO AFFECTED AREA THREE TIMES DAILY FOR 14 DAYS    Eczema       VITAMIN D PO      Take 1 tablet by mouth        * Notice:  This list has 2 medication(s) that are the same as other medications prescribed for you. Read the directions carefully, and ask your doctor or other care provider to review them with you.
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
## 2018-02-26 NOTE — PATIENT INSTRUCTIONS
Proper skin care from [facility]:    -Eliminate harsh soaps as they strip the natural oils from the skin, often resulting in dry itchy skin ( i.e. Dial, Zest, Kylah Spring)  -Use mild soaps such as Cetaphil or Dove Sensitive Skin in the shower. You do not need to use soap on arms, legs, and trunk every time you shower unless visibly soiled.   -Avoid hot or cold showers.  -After showering, lightly dry off and apply moisturizing within 2-3 minutes. This will help trap moisture in the skin.   -Aggressive use of a moisturizer at least 1-2 times a day to the entire body (including -Vanicream, Cetaphil, Aquaphor or Cerave) and moisturize hands after every washing.  -We recommend using moisturizers that come in a tub that needs to be scooped out, not a pump. This has more of an oil base. It will hold moisture in your skin much better than a water base moisturizer. The above recommended are non-pore clogging.    **Can purchase Sarna sensitive and use a few times a day to body. Does not replace using thick moisturizing cream 2x a day.     Underarms  Apply Hydrocortison 2x a day for up to one week. Take a week off.       Scalp  Wash hair daily with nizoral   Apply lidex 2x a day    Follow up in 4 weeks    Get biopsy results for spot on nose.

## 2018-02-26 NOTE — NURSING NOTE
"Chief Complaint   Patient presents with     Derm Problem     scalp - psoriasis, spot near eye rt       Initial /75  Pulse 67  SpO2 99% Estimated body mass index is 29.11 kg/(m^2) as calculated from the following:    Height as of 1/12/18: 1.562 m (5' 1.5\").    Weight as of 1/12/18: 71 kg (156 lb 9.6 oz).  Medication Reconciliation: complete    Luli Cano MA  "

## 2018-03-26 ENCOUNTER — OFFICE VISIT (OUTPATIENT)
Dept: DERMATOLOGY | Facility: CLINIC | Age: 68
End: 2018-03-26
Payer: COMMERCIAL

## 2018-03-26 VITALS — HEART RATE: 72 BPM | DIASTOLIC BLOOD PRESSURE: 63 MMHG | SYSTOLIC BLOOD PRESSURE: 92 MMHG | OXYGEN SATURATION: 98 %

## 2018-03-26 DIAGNOSIS — L29.9 LOCALIZED PRURITUS: ICD-10-CM

## 2018-03-26 DIAGNOSIS — C44.91 NODULAR BASAL CELL CARCINOMA: Primary | ICD-10-CM

## 2018-03-26 DIAGNOSIS — L40.9 PSORIASIS: ICD-10-CM

## 2018-03-26 PROCEDURE — 99213 OFFICE O/P EST LOW 20 MIN: CPT | Performed by: PHYSICIAN ASSISTANT

## 2018-03-26 NOTE — PATIENT INSTRUCTIONS
Continue washing with Nizoral and rotating with head and shoulder, t sal, or selsen blue. Can incorporate carlos in regimen too.   Increase lidex solution to 2x a day for 2-3 weeks and then     Set up appointment for mohs or right nasal canthus.

## 2018-03-26 NOTE — NURSING NOTE
"Chief Complaint   Patient presents with     Derm Problem       Initial BP 92/63  Pulse 72  SpO2 98% Estimated body mass index is 29.11 kg/(m^2) as calculated from the following:    Height as of 1/12/18: 1.562 m (5' 1.5\").    Weight as of 1/12/18: 71 kg (156 lb 9.6 oz).  Medication Reconciliation: complete    "
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
## 2018-03-26 NOTE — NURSING NOTE
Sent pt to reception to book Mohs Surgery with Dr Blakely. Gave pt Mohs handout/information.  Pt has had Mohs before. GELACIO Ramos LPN
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
## 2018-03-26 NOTE — MR AVS SNAPSHOT
"              After Visit Summary   3/26/2018    Liz Haider    MRN: [id number]           Patient Information     Date Of Birth          1950        Visit Information        Provider Department      3/26/2018 1:00 PM Raina Diego PA-C [facility]        Care Instructions    Continue washing with Nizoral and rotating with head and shoulder, t sal, or selsen blue. Can incorporate carlos in regimen too.   Increase lidex solution to 2x a day for 2-3 weeks and then     Set up appointment for mohs or right nasal canthus.          Follow-ups after your visit        Your next 10 appointments already scheduled     Apr 02, 2018  8:15 AM CDT   (Arrive by 8:00 AM)   MA SCREENING DIGITAL BILATERAL with OXMA[facility]   [facility] ([facility])    [address] [place] [id number] [phone]           Do not use any powder, lotion or deodorant under your arms or on your breast. If you do, we will ask you to remove it before your exam.  Wear comfortable, two-piece clothing.  If you have any allergies, tell your care team.  Bring any previous mammograms from other facilities or have them mailed to the breast center. Three-dimensional (3D) mammograms are available at [facility] locations in [facility], [facility], [facility], and [place]. [facility] locations include [place] and [facility] in [place]. Benefits of 3D mammograms include: - Improved rate of cancer detection - Decreases your chance of having to go back for more tests, which means fewer: - \"False-positive\" results (This means that there is an abnormal area but it isn't cancer.) - Invasive testing procedures, such as a biopsy or surgery - Can provide clearer images of the breast if you have dense breast tissue. 3D mammography is an optional exam that anyone can have with a 2D mammogram. It doesn't replace or take the " place of a 2D mammogram. 2D mammograms remain an effective screening test for all women.  Not all insurance companies cover the cost of a 3D mammogram. Check with your insurance.              Future tests that were ordered for you today     Open Future Orders        Priority Expected Expires Ordered    MA Screening Digital Bilateral Routine  3/26/2019 3/26/2018            Who to contact     If you have questions or need follow up information about today's clinic visit or your schedule please contact [facility] directly at [phone].  Normal or non-critical lab and imaging results will be communicated to you by legalPADhart, letter or phone within 4 business days after the clinic has received the results. If you do not hear from us within 7 days, please contact the clinic through [facility] or phone. If you have a critical or abnormal lab result, we will notify you by phone as soon as possible.  Submit refill requests through [employer] or call your pharmacy and they will forward the refill request to us. Please allow 3 business days for your refill to be completed.          Additional Information About Your Visit        [facility] Information     [facility] gives you secure access to your electronic health record. If you see a primary care provider, you can also send messages to your care team and make appointments. If you have questions, please call your primary care clinic.  If you do not have a primary care provider, please call [phone] and they will assist you.        Care EveryWhere ID     This is your Care EveryWhere ID. This could be used by other organizations to access your [facility] medical records  [id number]        Your Vitals Were     Pulse Pulse Oximetry                72 98%           Blood Pressure from Last 3 Encounters:   03/26/18 92/63   02/26/18 122/75   01/12/18 116/74    Weight from Last 3 Encounters:   01/12/18 71 kg (156 lb 9.6 oz)   07/06/17 70.9 kg (156 lb 4.8 oz)    05/03/17 72 kg (158 lb 11.2 oz)              Today, you had the following     No orders found for display       Primary Care Provider Office Phone # Fax #    Abundio Burger [phone]       [address] [facility] [address]        Equal Access to Services     RONAN BLANK : Hadii aad ku hadasho Soomaali, waaxda luqadaha, qaybta kaalmada adeegyada, waxay idiin hayaan adeeg khfranksh la'aan ah. So [facility] [phone].    ATENCIÓN: Si habla español, tiene a medrano disposición servicios gratuitos de asistencia lingüística. [facility] [phone].    We comply with applicable federal civil rights laws and [place] laws. We do not discriminate on the basis of race, color, national origin, age, disability, sex, sexual orientation, or gender identity.            Thank you!     Thank you for choosing [facility]  for your care. Our goal is always to provide you with excellent care. Hearing back from our patients is one way we can continue to improve our services. Please take a few minutes to complete the written survey that you may receive in the mail after your visit with us. Thank you!             Your Updated Medication List - Protect others around you: Learn how to safely use, store and throw away your medicines at www.disposemymeds.org.          This list is accurate as of 3/26/18  1:20 PM.  Always use your most recent med list.                   Brand Name Dispense Instructions for use Diagnosis    AMBIEN 5 MG tablet   Generic drug:  zolpidem      Take 0.5-1 tablets (2.5-5 mg) by mouth nightly as needed for sleep        amLODIPine 10 MG tablet    NORVASC    90 tablet    TAKE ONE TABLET BY MOUTH ONCE DAILY IN THE EVENING    Essential hypertension       aspirin 81 MG tablet     100    ONE DAILY    Unspecified cardiovascular disease       atorvastatin 40 MG tablet    LIPITOR    90 tablet    TAKE ONE TABLET BY MOUTH ONCE DAILY AT BEDTIME    Hyperlipidemia LDL goal <100        bimatoprost 0.03 % Soln    latisse    3 mL    APPLY ONCE DAILY AT BEDTIME TO UPPER EYELID MARGIN WITH APPLICATOR (NOT LOWER EYELID) AS NEEDED.    Hypotrichosis of eyelid, unspecified laterality       CALCIUM 1200 PO      Take 2 tablets by mouth.    Routine general medical examination at a health care facility, Unspecified essential hypertension, Other and unspecified hyperlipidemia, Acute myocardial infarction, unspecified site, subsequent episode of care, Sleep disturbance, unspecified, Generalized anxiety disorder, AAA (abdominal aortic aneurysm) (H)       fluocinonide 0.05 % solution    LIDEX    60 mL    Apply to scalp and top of ears BID for 3-4 weeks. Do not use on face or body folds.    Dermatitis, seborrheic       hydrocortisone 0.2 % cream    WESTCORT    45 g    Apply a thin layer to underarms 2x a day for up to 7 days. Take 7 days off.    Dermatitis       ketoconazole 2 % shampoo    NIZORAL    120 mL    Wash hair daily. Lather and let sit for a few minutes on scalp and ears. Rinse.    Dermatitis, seborrheic       lisinopril 20 MG tablet    PRINIVIL/ZESTRIL    180 tablet    Take 1 tablet (20 mg) by mouth 2 times daily    Benign essential hypertension       LORazepam 0.5 MG tablet    ATIVAN    90 tablet    Take 1 tablet by mouth every 6 hours as needed for anxiety.    Sleep disturbance, unspecified, Acute myocardial infarction, unspecified site, subsequent episode of care, Unspecified essential hypertension, Other and unspecified hyperlipidemia, Generalized anxiety disorder, Routine general medical examination at a health care facility, AAA (abdominal aortic aneurysm) (H)       metoprolol succinate 100 MG 24 hr tablet    TOPROL-XL    180 tablet    Take 1 tablet (100 mg) by mouth daily    Benign essential hypertension       Multi-vitamin Tabs tablet   Generic drug:  multivitamin, therapeutic with minerals     100    as directed        omeprazole 20 MG CR capsule    priLOSEC    90 capsule    Take 1 capsule (20  mg) by mouth daily    Gastroesophageal reflux disease without esophagitis       sertraline 100 MG tablet    ZOLOFT     Take 1 tablet (100 mg) by mouth daily Rx'd by psych    Generalized anxiety disorder       spironolactone 25 MG tablet    ALDACTONE    90 tablet    TAKE ONE TABLET BY MOUTH ONCE DAILY IN THE MORNING    Essential hypertension       * triamcinolone 0.1 % lotion    KENALOG    60 mL    Apply sparingly once or twice per day as needed to affected area until the skin is better, then stop; REPEAT AS NEEDED    Psoriasis       * triamcinolone 0.1 % cream    KENALOG    45 g    APPLY SPARINGLY TO AFFECTED AREA THREE TIMES DAILY FOR 14 DAYS    Eczema       VITAMIN D PO      Take 1 tablet by mouth        * Notice:  This list has 2 medication(s) that are the same as other medications prescribed for you. Read the directions carefully, and ask your doctor or other care provider to review them with you.

## 2018-04-02 ENCOUNTER — RADIANT APPOINTMENT (OUTPATIENT)
Dept: MAMMOGRAPHY | Facility: CLINIC | Age: 68
End: 2018-04-02
Attending: INTERNAL MEDICINE
Payer: COMMERCIAL

## 2018-04-02 DIAGNOSIS — Z12.39 BREAST CANCER SCREENING: ICD-10-CM

## 2018-04-02 DIAGNOSIS — Z12.31 VISIT FOR SCREENING MAMMOGRAM: ICD-10-CM

## 2018-04-02 PROCEDURE — 77067 SCR MAMMO BI INCL CAD: CPT | Mod: TC

## 2018-04-17 ENCOUNTER — OFFICE VISIT (OUTPATIENT)
Dept: INTERNAL MEDICINE | Facility: CLINIC | Age: 68
End: 2018-04-17
Payer: COMMERCIAL

## 2018-04-17 VITALS
SYSTOLIC BLOOD PRESSURE: 124 MMHG | TEMPERATURE: 98.4 F | HEART RATE: 74 BPM | BODY MASS INDEX: 28.47 KG/M2 | HEIGHT: 62 IN | DIASTOLIC BLOOD PRESSURE: 60 MMHG | WEIGHT: 154.7 LBS | RESPIRATION RATE: 19 BRPM

## 2018-04-17 DIAGNOSIS — Z87.891 PERSONAL HISTORY OF TOBACCO USE: ICD-10-CM

## 2018-04-17 DIAGNOSIS — Z23 NEED FOR VACCINATION: ICD-10-CM

## 2018-04-17 DIAGNOSIS — Z12.11 SPECIAL SCREENING FOR MALIGNANT NEOPLASMS, COLON: ICD-10-CM

## 2018-04-17 DIAGNOSIS — I10 BENIGN ESSENTIAL HYPERTENSION: ICD-10-CM

## 2018-04-17 DIAGNOSIS — Z12.2 ENCOUNTER FOR SCREENING FOR LUNG CANCER: ICD-10-CM

## 2018-04-17 DIAGNOSIS — Z79.899 MEDICATION MANAGEMENT: ICD-10-CM

## 2018-04-17 DIAGNOSIS — Z78.0 ASYMPTOMATIC MENOPAUSE: ICD-10-CM

## 2018-04-17 DIAGNOSIS — Z76.89 ENCOUNTER TO ESTABLISH CARE: Primary | ICD-10-CM

## 2018-04-17 PROCEDURE — 99214 OFFICE O/P EST MOD 30 MIN: CPT | Mod: 25 | Performed by: INTERNAL MEDICINE

## 2018-04-17 PROCEDURE — 90732 PPSV23 VACC 2 YRS+ SUBQ/IM: CPT | Performed by: INTERNAL MEDICINE

## 2018-04-17 PROCEDURE — G0009 ADMIN PNEUMOCOCCAL VACCINE: HCPCS | Performed by: INTERNAL MEDICINE

## 2018-04-17 RX ORDER — ATENOLOL 100 MG/1
100 TABLET ORAL DAILY
COMMUNITY
Start: 2018-03-22 | End: 2019-09-03

## 2018-04-17 RX ORDER — BIOTIN 1000 MCG
1000 TABLET,CHEWABLE ORAL DAILY
COMMUNITY
Start: 2018-01-03 | End: 2019-09-03

## 2018-04-17 ASSESSMENT — PAIN SCALES - GENERAL: PAINLEVEL: NO PAIN (0)

## 2018-04-17 NOTE — MR AVS SNAPSHOT
After Visit Summary   4/17/2018    Liz Haider    MRN: [id number]           Patient Information     Date Of Birth          1950        Visit Information        Provider Department      4/17/2018 9:45 AM Sydnee Miller MD [facility]        Today's Diagnoses     Need for vaccination    -  1    Special screening for malignant neoplasms, colon        Asymptomatic menopause          Care Instructions    Pneumovax today.    ---    Please schedule DEXA scan on the way out.    ---    Can STOP multivitamin.    ---    Shingrix recommended!    ---    You will be contacted to schedule a colonoscopy.           Follow-ups after your visit        Additional Services     GASTROENTEROLOGY ADULT REF PROCEDURE ONLY [facility] [phone]       Last Lab Result: Creatinine (mg/dL)       Date                     Value                 01/12/2018               0.92             ----------  Body mass index is 28.76 kg/(m^2).      Patient will be contacted to schedule procedure.     Please be aware that coverage of these services is subject to the terms and limitations of your health insurance plan.  Call member services at your health plan with any benefit or coverage questions.  Any procedures must be performed at a [facility] facility OR coordinated by your clinic's referral office.    Please bring the following with you to your appointment:    (1) Any X-Rays, CTs or MRIs which have been performed.  Contact the facility where they were done to arrange for  prior to your scheduled appointment.    (2) List of current medications   (3) This referral request   (4) Any documents/labs given to you for this referral                  Your next 10 appointments already scheduled     May 31, 2018  7:15 AM CDT   [facility] with Efraín Blakely MD   [facility]    [address] [address] [phone]  "             Future tests that were ordered for you today     Open Future Orders        Priority Expected Expires Ordered    DX Hip/Pelvis/Spine Routine  4/18/2019 4/17/2018            Who to contact     If you have questions or need follow up information about today's clinic visit or your schedule please contact [facility] directly at [phone].  Normal or non-critical lab and imaging results will be communicated to you by MyChart, letter or phone within 4 business days after the clinic has received the results. If you do not hear from us within 7 days, please contact the clinic through [employer] or phone. If you have a critical or abnormal lab result, we will notify you by phone as soon as possible.  Submit refill requests through [employer] or call your pharmacy and they will forward the refill request to us. Please allow 3 business days for your refill to be completed.          Additional Information About Your Visit        [employer] Information     [employer] gives you secure access to your electronic health record. If you see a primary care provider, you can also send messages to your care team and make appointments. If you have questions, please call your primary care clinic.  If you do not have a primary care provider, please call [phone] and they will assist you.        Care EveryWhere ID     This is your Care EveryWhere ID. This could be used by other organizations to access your [place] medical records  [id number]        Your Vitals Were     Pulse Temperature Respirations Height BMI (Body Mass Index)       74 98.4  F (36.9  C) (Oral) 19 5' 1.5\" (1.562 m) 28.76 kg/m2        Blood Pressure from Last 3 Encounters:   04/17/18 124/60   03/26/18 92/63   02/26/18 122/75    Weight from Last 3 Encounters:   04/17/18 154 lb 11.2 oz (70.2 kg)   01/12/18 156 lb 9.6 oz (71 kg)   07/06/17 156 lb 4.8 oz (70.9 kg)              We Performed the Following     1st  Administration  [63061]     " GASTROENTEROLOGY ADULT REF PROCEDURE ONLY [facility] [phone]     Pneumococcal vaccine 23 valent PPSV23  (Pneumovax) [41353]        Primary Care Provider Office Phone # Fax #    Sydnee Miller [phone] [address] [facility] [address]        Equal Access to Services     : Hadii aad ku hadasho Soomaali, waaxda luqadaha, qaybta kaalmada adeegyada, waxay idiin hayaan adeeg kharash la'aan . So [facility] [phone].    ATENCIÓN: Si habla español, tiene a medrano disposición servicios gratuitos de asistencia lingüística. Llame al [phone].    We comply with applicable federal civil rights laws and [place] laws. We do not discriminate on the basis of race, color, national origin, age, disability, sex, sexual orientation, or gender identity.            Thank you!     Thank you for choosing [facility]  for your care. Our goal is always to provide you with excellent care. Hearing back from our patients is one way we can continue to improve our services. Please take a few minutes to complete the written survey that you may receive in the mail after your visit with us. Thank you!             Your Updated Medication List - Protect others around you: Learn how to safely use, store and throw away your medicines at www.disposemymeds.org.          This list is accurate as of 4/17/18 10:28 AM.  Always use your most recent med list.                   Brand Name Dispense Instructions for use Diagnosis    AMBIEN 5 MG tablet   Generic drug:  zolpidem      Take 0.5-1 tablets (2.5-5 mg) by mouth nightly as needed for sleep        amLODIPine 10 MG tablet    NORVASC    90 tablet    TAKE ONE TABLET BY MOUTH ONCE DAILY IN THE EVENING    Essential hypertension       aspirin 81 MG tablet     100    ONE DAILY    Unspecified cardiovascular disease       atenolol 100 MG tablet    TENORMIN     Take 100 mg by mouth daily        atorvastatin 40 MG tablet    LIPITOR    90  tablet    TAKE ONE TABLET BY MOUTH ONCE DAILY AT BEDTIME    Hyperlipidemia LDL goal <100       bimatoprost 0.03 % Soln    latisse    3 mL    APPLY ONCE DAILY AT BEDTIME TO UPPER EYELID MARGIN WITH APPLICATOR (NOT LOWER EYELID) AS NEEDED.    Hypotrichosis of eyelid, unspecified laterality       Biotin 1000 MCG Chew      Take 1,000 mcg by mouth daily        CALCIUM 1200 PO      Take 2 tablets by mouth.    Routine general medical examination at a health care facility, Unspecified essential hypertension, Other and unspecified hyperlipidemia, Acute myocardial infarction, unspecified site, subsequent episode of care, Sleep disturbance, unspecified, Generalized anxiety disorder, AAA (abdominal aortic aneurysm) (H)       fluocinonide 0.05 % solution    LIDEX    60 mL    Apply to scalp and top of ears BID for 3-4 weeks. Do not use on face or body folds.    Dermatitis, seborrheic       ketoconazole 2 % shampoo    NIZORAL    120 mL    Wash hair daily. Lather and let sit for a few minutes on scalp and ears. Rinse.    Dermatitis, seborrheic       lisinopril 20 MG tablet    PRINIVIL/ZESTRIL    180 tablet    Take 1 tablet (20 mg) by mouth 2 times daily    Benign essential hypertension       LORazepam 0.5 MG tablet    ATIVAN    90 tablet    Take 1 tablet by mouth every 6 hours as needed for anxiety.    Sleep disturbance, unspecified, Acute myocardial infarction, unspecified site, subsequent episode of care, Unspecified essential hypertension, Other and unspecified hyperlipidemia, Generalized anxiety disorder, Routine general medical examination at a health care facility, AAA (abdominal aortic aneurysm) (H)       metoprolol succinate 100 MG 24 hr tablet    TOPROL-XL    180 tablet    Take 1 tablet (100 mg) by mouth daily    Benign essential hypertension       omeprazole 20 MG CR capsule    priLOSEC    90 capsule    Take 1 capsule (20 mg) by mouth daily    Gastroesophageal reflux disease without esophagitis       sertraline 100 MG  tablet    ZOLOFT     Take 1 tablet (100 mg) by mouth daily Rx'd by psych    Generalized anxiety disorder       spironolactone 25 MG tablet    ALDACTONE    90 tablet    TAKE ONE TABLET BY MOUTH ONCE DAILY IN THE MORNING    Essential hypertension       * triamcinolone 0.1 % lotion    KENALOG    60 mL    Apply sparingly once or twice per day as needed to affected area until the skin is better, then stop; REPEAT AS NEEDED    Psoriasis       * triamcinolone 0.1 % cream    KENALOG    80 g    Apply topically 2 times daily    Eczema, unspecified type       VITAMIN D PO      Take 1 tablet by mouth        * Notice:  This list has 2 medication(s) that are the same as other medications prescribed for you. Read the directions carefully, and ask your doctor or other care provider to review them with you.

## 2018-04-17 NOTE — NURSING NOTE
Screening Questionnaire for Adult Immunization    Are you sick today?   No   Do you have allergies to medications, food, a vaccine component or latex?   No   Have you ever had a serious reaction after receiving a vaccination?   No   Do you have a long-term health problem with heart disease, lung disease, asthma, kidney disease, metabolic disease (e.g. diabetes), anemia, or other blood disorder?   Yes   Do you have cancer, leukemia, HIV/AIDS, or any other immune system problem?   No   In the past 3 months, have you taken medications that affect  your immune system, such as prednisone, other steroids, or anticancer drugs; drugs for the treatment of rheumatoid arthritis, Crohn s disease, or psoriasis; or have you had radiation treatments?   Yes   Have you had a seizure, or a brain or other nervous system problem?   No   During the past year, have you received a transfusion of blood or blood     products, or been given immune (gamma) globulin or antiviral drug?   No   For women: Are you pregnant or is there a chance you could become        pregnant during the next month?   No   Have you received any vaccinations in the past 4 weeks?   No     Immunization questionnaire was positive for at least one answer.  Notified Dr Miller.        Per orders of Dr. Miller, injection of Pneumovax 23 given by Jonelle Pinto. Patient instructed to remain in clinic for 15 minutes afterwards, and to report any adverse reaction to me immediately.       Screening performed by Jonelle Pinto on 4/17/2018 at 10:36 AM.

## 2018-04-17 NOTE — PROGRESS NOTES
SUBJECTIVE:                                                      HPI: Liz Haider is a pleasant 67 year old female who presents to [facility] care:    No specific complaints, concerns, or questions.    PMH, PSH, FH, SH, medications, allergies, immunizations, and preventative health measures reviewed.     Past Medical History:   Diagnosis Date     Acid reflux disease      Aneurysmal dilatation (H)      Benign essential hypertension      CAD (coronary artery disease)      VISHAL (generalized anxiety disorder)      History of alcohol abuse      History of basal cell carcinoma     multiple around right eye     MDD (major depressive disorder)      Re: acid reflux disease: well-controlled on omeprazole 20 mg daily.  Re: aneurysmal dilatation: proximal abdominal aorta measuring up to 3 cm.  Re: CAD:    - acute NSTEMI in 2006   - severe single-vessel disease in RCA status post PTCA and EUGENIO   - on daily baby aspirin, Lipitor, and atenolol/Toprol  Re: HTN: well-controlled on    - atenolol 100 mg daily - unclear why on TWO beta-blockers   - Toprol 100 mg daily - unclear why on TWO beta-blockers   - lisinopril 20mg bid   - Norvasc 10 mg daily   - spironolactone 25 mg daily  Re: VISHAL/MDD: well-controlled on Zoloft 100 mg daily.  Re: history of alcohol abuse: has been sober since 1987  Re: BCC: multiple around right eye; followed by dermatology.    Past Surgical History:   Procedure Laterality Date     COLONOSCOPY  4/20/2012    Procedure:COLONOSCOPY; COLONOSCOPY; Surgeon:EDSON SHELLEY; Location:[facility]     HYSTERECTOMY TOTAL ABDOMINAL  1988    for heavy periods     TONSILLECTOMY & ADENOIDECTOMY  1952     Family History   Problem Relation Age of Onset     Myocardial Infarction Father      later in life     Type 2 Diabetes Sister      Bladder Cancer Sister      smoking history     Dementia Sister      FTD     Aneurysm Sister      AAA     Dementia Mother      CEREBROVASCULAR DISEASE No family hx of      Coronary Artery Disease  Early Onset No family hx of      Breast Cancer No family hx of      Ovarian Cancer No family hx of      Colon Cancer No family hx of      Occupational History     Retired - [employer]      Social History Main Topics     Smoking status: Former Smoker     Packs/day: 2.00     Years: 35.00     Types: Cigarettes     Quit date: 7/1/2006     Smokeless tobacco: Never Used     Alcohol use No      Comment: history of alcohol abuse; sober since 1987     Drug use: No     Sexual activity: No     Social History Narrative    .    No kids.    Walks daily, but no formal exercise.       Allergies   Allergen Reactions     Penicillins Hives     Sulfa Drugs Hives     Current Outpatient Prescriptions   Medication Sig     atenolol (TENORMIN) 100 MG tablet Take 100 mg by mouth daily     Biotin 1000 MCG CHEW Take 1,000 mcg by mouth daily     fluocinonide (LIDEX) 0.05 % solution Apply to scalp and top of ears BID for 3-4 weeks. Do not use on face or body folds.     triamcinolone (KENALOG) 0.1 % cream Apply topically 2 times daily     ketoconazole (NIZORAL) 2 % shampoo Wash hair daily. Lather and let sit for a few minutes on scalp and ears. Rinse.     bimatoprost (LATISSE) 0.03 % SOLN APPLY ONCE DAILY AT BEDTIME TO UPPER EYELID MARGIN WITH APPLICATOR (NOT LOWER EYELID) AS NEEDED.     atorvastatin (LIPITOR) 40 MG tablet TAKE ONE TABLET BY MOUTH ONCE DAILY AT BEDTIME     metoprolol succinate (TOPROL-XL) 100 MG 24 hr tablet Take 1 tablet (100 mg) by mouth daily     lisinopril (PRINIVIL/ZESTRIL) 20 MG tablet Take 1 tablet (20 mg) by mouth 2 times daily     amLODIPine (NORVASC) 10 MG tablet TAKE ONE TABLET BY MOUTH ONCE DAILY IN THE EVENING     omeprazole (PRILOSEC) 20 MG CR capsule Take 1 capsule (20 mg) by mouth daily     triamcinolone (KENALOG) 0.1 % lotion Apply sparingly once or twice per day as needed to affected area until the skin is better, then stop; REPEAT AS NEEDED     spironolactone (ALDACTONE) 25 MG tablet  "TAKE ONE TABLET BY MOUTH ONCE DAILY IN THE MORNING     zolpidem (AMBIEN) 5 MG tablet Take 0.5-1 tablets (2.5-5 mg) by mouth nightly as needed for sleep     sertraline (ZOLOFT) 100 MG tablet Take 1 tablet (100 mg) by mouth daily Rx'd by psych     Cholecalciferol (VITAMIN D PO) Take 1 tablet by mouth     LORazepam (ATIVAN) 0.5 MG tablet Take 1 tablet by mouth every 6 hours as needed for anxiety.     Calcium Carbonate-Vit D-Min (CALCIUM 1200 PO) Take 2 tablets by mouth.     ASPIRIN 81 MG OR TABS ONE DAILY     Immunization History   Administered Date(s) Administered     Influenza (H1N1) 01/05/2010     Influenza (High Dose) 3 valent vaccine 09/11/2014, 09/21/2015, 09/07/2016, 08/29/2017     Influenza (IIV3) PF 10/29/1997, 11/04/2007, 10/05/2008, 10/01/2012, 09/19/2013     Pneumo Conj 13-V (2010&after) 04/14/2015     Pneumococcal 23 valent 12/04/2007, 04/17/2018     TD (ADULT, 7+) 02/08/2006     TDAP Vaccine (Adacel) 02/09/2016     Zoster vaccine, live 12/31/2008     OBJECTIVE:                                                      /60 (BP Location: Left arm, Patient Position: Chair, Cuff Size: Adult Regular)  Pulse 74  Temp 98.4  F (36.9  C) (Oral)  Resp 19  Ht 5' 1.5\" (1.562 m)  Wt 154 lb 11.2 oz (70.2 kg)  BMI 28.76 kg/m2  Constitutional: well-appearing  Psych: normal judgment and insight; normal mood and affect; recent and remote memory intact; oriented to time, place, and person    PREVENTATIVE HEALTH                                                      BMI: overweight  Breast CA screening: up to date   Cervical CA screening: screening no longer indicated  Colon CA screening: DUE  Lung CA screening: DUE  Dexa: DUE  Screening HCV: completed  Screening cholesterol: up to date   Screening diabetes:   STD testing: no risk factors present  Alcohol misuse screening: alcohol use reviewed - no intervention indicated at this time  Immunizations: reviewed; Pneumovax DUE; Shingrix recommended - patient will look into " coverage    ASSESSMENT/PLAN:                                                       (Z76.89) Encounter to establish care  (primary encounter diagnosis)  Comment: PMH, PSH, FH, SH, medications, allergies, immunizations, and preventative health measures reviewed.   Plan: see below for plans.     (Z23) Need for vaccination  Plan: Pneumovax given today.    (Z12.11) Special screening for malignant neoplasms, colon  Plan: screening colonoscopy ordered - patient will be contacted to schedule.     (Z12.2) Encounter for screening for lung cancer  (Z87.891) Personal history of tobacco use  Plan: CT Chest Lung Cancer Screenn Low Dose wo ordered - patient to schedule.    (Z78.0) Asymptomatic menopause  Plan: DEXA ordered - patient to schedule.     (I10) Benign essential hypertension  (Z79.899) Medication management  Comment: unclear why patient is on 2 beta blockers.  Plan:    - STOP Toprol; CONTINUE atenolol 100 mg daily.   - CONTINUE lisinopril 20 mg twice daily, amlodipine 10 mg daily, and spironolactone 25 mg daily.   - patient to keep an eye on blood pressures and contact MD if they are consistently elevated (>130/80).     The instructions on the AVS were discussed and explained to the patient. Patient expressed understanding of instructions.    A total of 30 minutes were spent face-to-face with this patient during this encounter and over half of that time was spent on counseling and coordination of care re: above diagnoses and plans of care.     (Chart documentation was completed, in part, with [employer] voice-recognition software. Even though reviewed, some grammatical, spelling, and word errors may remain.)    Sydnee Miller MD   [address] [address]  T: [phone], F: [phone]

## 2018-04-17 NOTE — PATIENT INSTRUCTIONS
Pneumovax today.    ---    Please schedule DEXA scan on the way out.    ---    Can STOP multivitamin.    ---    Shingrix recommended!    ---    You will be contacted to schedule a colonoscopy.

## 2018-04-19 ENCOUNTER — RADIANT APPOINTMENT (OUTPATIENT)
Dept: BONE DENSITY | Facility: CLINIC | Age: 68
End: 2018-04-19
Attending: INTERNAL MEDICINE
Payer: COMMERCIAL

## 2018-04-19 DIAGNOSIS — Z78.0 ASYMPTOMATIC MENOPAUSE: ICD-10-CM

## 2018-04-19 PROCEDURE — 77080 DXA BONE DENSITY AXIAL: CPT | Performed by: INTERNAL MEDICINE

## 2018-04-20 ENCOUNTER — HOSPITAL ENCOUNTER (OUTPATIENT)
Dept: CT IMAGING | Facility: CLINIC | Age: 68
Discharge: HOME | End: 2018-04-20
Attending: INTERNAL MEDICINE | Admitting: INTERNAL MEDICINE
Payer: MEDICARE

## 2018-04-20 DIAGNOSIS — Z12.2 ENCOUNTER FOR SCREENING FOR LUNG CANCER: ICD-10-CM

## 2018-04-20 DIAGNOSIS — Z87.891 PERSONAL HISTORY OF TOBACCO USE: ICD-10-CM

## 2018-04-20 PROCEDURE — G0297 LDCT FOR LUNG CA SCREEN: HCPCS

## 2018-05-03 DIAGNOSIS — I10 ESSENTIAL HYPERTENSION: ICD-10-CM

## 2018-05-04 RX ORDER — SPIRONOLACTONE 25 MG/1
TABLET ORAL
Qty: 30 TABLET | Refills: 0 | Status: SHIPPED | OUTPATIENT
Start: 2018-05-04 | End: 2018-06-12

## 2018-05-11 ENCOUNTER — OFFICE VISIT (OUTPATIENT)
Dept: URGENT CARE | Facility: URGENT CARE | Age: 68
End: 2018-05-11
Payer: COMMERCIAL

## 2018-05-11 VITALS
HEART RATE: 70 BPM | OXYGEN SATURATION: 98 % | SYSTOLIC BLOOD PRESSURE: 102 MMHG | BODY MASS INDEX: 28.55 KG/M2 | DIASTOLIC BLOOD PRESSURE: 67 MMHG | TEMPERATURE: 97.6 F | RESPIRATION RATE: 18 BRPM | WEIGHT: 153.6 LBS

## 2018-05-11 DIAGNOSIS — R21 RASH AND NONSPECIFIC SKIN ERUPTION: Primary | ICD-10-CM

## 2018-05-11 PROCEDURE — 99213 OFFICE O/P EST LOW 20 MIN: CPT | Performed by: PHYSICIAN ASSISTANT

## 2018-05-11 RX ORDER — HYDROCORTISONE VALERATE CREAM 2 MG/G
CREAM TOPICAL
Qty: 15 G | Refills: 0 | Status: SHIPPED | OUTPATIENT
Start: 2018-05-11 | End: 2018-05-11

## 2018-05-11 RX ORDER — HYDROCORTISONE VALERATE CREAM 2 MG/G
CREAM TOPICAL
Qty: 15 G | Refills: 1 | Status: SHIPPED | OUTPATIENT
Start: 2018-05-11 | End: 2019-09-03

## 2018-05-11 NOTE — PROGRESS NOTES
SUBJECTIVE:  Liz Haider is a 67 year old female who presents to the clinic today for a rash.  Onset of rash was 1 day(s) ago.   Rash is still present.  Location of the rash: left around face.  Quality/symptoms of rash:    Symptoms are mild and moderate and rash seems to be stable.  Previous history of a similar rash? No  Recent exposure history: unknown  Recent new medications: none  Associated symptoms include: annular rash on left temple, started last night, quick onset.    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     Benign essential hypertension      CAD (coronary artery disease) 2006    [facility]; acute NSTEMI, severe single vessel disease in RCA, PTCA/EUGENIO     VISHAL (generalized anxiety disorder)      History of alcohol abuse     sober since 1987     History of basal cell carcinoma     multiple around right eye     MDD (major depressive disorder)         Allergies   Allergen Reactions     Penicillins Hives     Sulfa Drugs Hives     Social History   Substance Use Topics     Smoking status: Former Smoker     Packs/day: 2.00     Years: 35.00     Types: Cigarettes     Quit date: 7/1/2006     Smokeless tobacco: Never Used     Alcohol use No      Comment: history of alcohol abuse; sober since 1987       ROS:  CONSTITUTIONAL:NEGATIVE for fever, chills, change in weight  INTEGUMENTARY/SKIN: POSITIVE for left annular rash on left side face  ENT/MOUTH: Positive for left side temple area  RESP:NEGATIVE for significant cough or SOB  CV: NEGATIVE for chest pain, palpitations or peripheral edema  MUSCULOSKELETAL: NEGATIVE for significant arthralgias or myalgia  NEURO: NEGATIVE for weakness, dizziness or paresthesias    EXAM:   /67  Pulse 70  Temp 97.6  F (36.4  C) (Oral)  Resp 18  Wt 153 lb 9.6 oz (69.7 kg)  SpO2 98%  BMI 28.55 kg/m2  GENERAL: alert, no acute distress.  SKIN: Rash  description:    Distribution: localized  Location: left annular rash on left side face    Color: red,  Lesion type: macular, isolated with annular rash  GENERAL APPEARANCE: healthy, alert and no distress  EYES: EOMI,  PERRL, conjunctiva clear  NECK: supple, non-tender to palpation, no adenopathy noted  NEURO: Normal strength and tone, sensory exam grossly normal,  normal speech and mentation    ASSESSMENT/PLAN:    ICD-10-CM    1. Rash and nonspecific skin eruption R21 hydrocortisone (WESTCORT) 0.2 % cream     DISCONTINUED: hydrocortisone (WESTCORT) 0.2 % cream     Orders Placed This Encounter     DISCONTD: hydrocortisone (WESTCORT) 0.2 % cream     hydrocortisone (WESTCORT) 0.2 % cream       1) See today's orders.  2) Follow-up with dermatology if not improving or worsening

## 2018-05-11 NOTE — MR AVS SNAPSHOT
After Visit Summary   5/11/2018    Liz Haider    MRN: [id number]           Patient Information     Date Of Birth          1950        Visit Information        Provider Department      5/11/2018 12:45 PM Saul Mcdonald PA-C [facility]        Today's Diagnoses     Rash and nonspecific skin eruption    -  1       Follow-ups after your visit        Your next 10 appointments already scheduled     May 31, 2018  7:15 AM CDT   [facility]    [address] [place] [address] [id number] [phone]              Who to contact     If you have questions or need follow up information about today's clinic visit or your schedule please contact [facility] directly at [phone].  Normal or non-critical lab and imaging results will be communicated to you by MyChart, letter or phone within 4 business days after the clinic has received the results. If you do not hear from us within 7 days, please contact the clinic through MyChart or phone. If you have a critical or abnormal lab result, we will notify you by phone as soon as possible.  Submit refill requests through [employer] or call your pharmacy and they will forward the refill request to us. Please allow 3 business days for your refill to be completed.          Additional Information About Your Visit        MyChart Information     [employer] gives you secure access to your electronic health record. If you see a primary care provider, you can also send messages to your care team and make appointments. If you have questions, please call your primary care clinic.  If you do not have a primary care provider, please call [phone] and they will assist you.        Care EveryWhere ID     This is your Care EveryWhere ID. This could be used by other organizations to access your [facility]  records  [id number]        Your Vitals Were     Pulse Temperature Respirations Pulse Oximetry BMI (Body Mass Index)       70 97.6  F (36.4  C) (Oral) 18 98% 28.55 kg/m2        Blood Pressure from Last 3 Encounters:   05/11/18 102/67   04/17/18 124/60   03/26/18 92/63    Weight from Last 3 Encounters:   05/11/18 153 lb 9.6 oz (69.7 kg)   04/17/18 154 lb 11.2 oz (70.2 kg)   01/12/18 156 lb 9.6 oz (71 kg)              Today, you had the following     No orders found for display         Today's Medication Changes          These changes are accurate as of 5/11/18  1:37 PM.  If you have any questions, ask your nurse or doctor.               Start taking these medicines.        Dose/Directions    hydrocortisone 0.2 % cream   Commonly known as:  WESTCORT   Used for:  Rash and nonspecific skin eruption   Started by:  Saul Mcdonald PA-C        Apply sparingly to affected area three times daily for 14 days.   Quantity:  15 g   Refills:  1            Where to get your medicines      These medications were sent to [facility] [address] [address]     Phone:  [phone]     hydrocortisone 0.2 % cream                Primary Care Provider Office Phone # Fax #    Sydnee Miller [phone]       [address] [place] [id number]        Equal Access to Services     RONAN BLANK : Mariano gonzaleso Sodaysi, waaxda luqadaha, qaybta kaalmada adeegyada, waxay tona carrera adejessica salazar. So [facility] [phone].    ATENCIÓN: Si habla español, tiene a medrano disposición servicios gratuitos de asistencia lingüística. Llame al [phone].    We comply with applicable federal civil rights laws and [place] laws. We do not discriminate on the basis of race, color, national origin, age, disability, sex, sexual orientation, or gender identity.            Thank you!     Thank you for choosing [facility]  for  your care. Our goal is always to provide you with excellent care. Hearing back from our patients is one way we can continue to improve our services. Please take a few minutes to complete the written survey that you may receive in the mail after your visit with us. Thank you!             Your Updated Medication List - Protect others around you: Learn how to safely use, store and throw away your medicines at www.disposemymeds.org.          This list is accurate as of 5/11/18  1:37 PM.  Always use your most recent med list.                   Brand Name Dispense Instructions for use Diagnosis    AMBIEN 5 MG tablet   Generic drug:  zolpidem      Take 0.5-1 tablets (2.5-5 mg) by mouth nightly as needed for sleep        amLODIPine 10 MG tablet    NORVASC    90 tablet    TAKE ONE TABLET BY MOUTH ONCE DAILY IN THE EVENING    Essential hypertension       aspirin 81 MG tablet     100    ONE DAILY    Unspecified cardiovascular disease       atenolol 100 MG tablet    TENORMIN     Take 100 mg by mouth daily        atorvastatin 40 MG tablet    LIPITOR    90 tablet    TAKE ONE TABLET BY MOUTH ONCE DAILY AT BEDTIME    Hyperlipidemia LDL goal <100       bimatoprost 0.03 % Soln    latisse    3 mL    APPLY ONCE DAILY AT BEDTIME TO UPPER EYELID MARGIN WITH APPLICATOR (NOT LOWER EYELID) AS NEEDED.    Hypotrichosis of eyelid, unspecified laterality       Biotin 1000 MCG Chew      Take 1,000 mcg by mouth daily        CALCIUM 1200 PO      Take 2 tablets by mouth.    Routine general medical examination at a health care facility, Unspecified essential hypertension, Other and unspecified hyperlipidemia, Acute myocardial infarction, unspecified site, subsequent episode of care, Sleep disturbance, unspecified, Generalized anxiety disorder, AAA (abdominal aortic aneurysm) (H)       fluocinonide 0.05 % solution    LIDEX    60 mL    Apply to scalp and top of ears BID for 3-4 weeks. Do not use on face or body folds.    Dermatitis, seborrheic        hydrocortisone 0.2 % cream    WESTCORT    15 g    Apply sparingly to affected area three times daily for 14 days.    Rash and nonspecific skin eruption       ketoconazole 2 % shampoo    NIZORAL    120 mL    Wash hair daily. Lather and let sit for a few minutes on scalp and ears. Rinse.    Dermatitis, seborrheic       lisinopril 20 MG tablet    PRINIVIL/ZESTRIL    180 tablet    Take 1 tablet (20 mg) by mouth 2 times daily    Benign essential hypertension       LORazepam 0.5 MG tablet    ATIVAN    90 tablet    Take 1 tablet by mouth every 6 hours as needed for anxiety.    Sleep disturbance, unspecified, Acute myocardial infarction, unspecified site, subsequent episode of care, Unspecified essential hypertension, Other and unspecified hyperlipidemia, Generalized anxiety disorder, Routine general medical examination at a health care facility, AAA (abdominal aortic aneurysm) (H)       omeprazole 20 MG CR capsule    priLOSEC    90 capsule    Take 1 capsule (20 mg) by mouth daily    Gastroesophageal reflux disease without esophagitis       sertraline 100 MG tablet    ZOLOFT     Take 1 tablet (100 mg) by mouth daily Rx'd by psych    Generalized anxiety disorder       spironolactone 25 MG tablet    ALDACTONE    30 tablet    TAKE ONE TABLET BY MOUTH ONCE DAILY IN THE MORNING    Essential hypertension       * triamcinolone 0.1 % lotion    KENALOG    60 mL    Apply sparingly once or twice per day as needed to affected area until the skin is better, then stop; REPEAT AS NEEDED    Psoriasis       * triamcinolone 0.1 % cream    KENALOG    80 g    Apply topically 2 times daily    Eczema, unspecified type       VITAMIN D PO      Take 1 tablet by mouth        * Notice:  This list has 2 medication(s) that are the same as other medications prescribed for you. Read the directions carefully, and ask your doctor or other care provider to review them with you.

## 2018-05-14 DIAGNOSIS — E78.5 HYPERLIPIDEMIA LDL GOAL <100: ICD-10-CM

## 2018-05-15 RX ORDER — ATORVASTATIN CALCIUM 40 MG/1
TABLET, FILM COATED ORAL
Qty: 90 TABLET | Refills: 1 | OUTPATIENT
Start: 2018-05-15

## 2018-05-15 NOTE — TELEPHONE ENCOUNTER
"Requested Prescriptions   Pending Prescriptions Disp Refills     atorvastatin (LIPITOR) 40 MG tablet [Pharmacy Med Name: ATORVASTATIN 40MG   TAB] 90 tablet 1    Last Written Prescription Date:  01/12/18  Last Fill Quantity: 90,  # refills: 1   Last office visit: 4/17/2018 with prescribing provider:  04/17/18   Future Office Visit:  0 Sig: TAKE ONE TABLET BY MOUTH ONCE DAILY AT BEDTIME    Statins Protocol Passed    5/14/2018  3:22 PM       Passed - LDL on file in past 12 months    Recent Labs   Lab Test  01/12/18   0916   LDL  72            Passed - No abnormal creatine kinase in past 12 months    No lab results found.            Passed - Recent (12 mo) or future (30 days) visit within the authorizing provider's specialty    Patient had office visit in the last 12 months or has a visit in the next 30 days with authorizing provider or within the authorizing provider's specialty.  See \"Patient Info\" tab in inbasket, or \"Choose Columns\" in Meds & Orders section of the refill encounter.           Passed - Patient is age 18 or older       Passed - No active pregnancy on record       Passed - No positive pregnancy test in past 12 months        "

## 2018-05-22 ENCOUNTER — TELEPHONE (OUTPATIENT)
Dept: INTERNAL MEDICINE | Facility: CLINIC | Age: 68
End: 2018-05-22

## 2018-05-22 DIAGNOSIS — Z23 NEED FOR SHINGLES VACCINE: Primary | ICD-10-CM

## 2018-05-22 NOTE — TELEPHONE ENCOUNTER
Received note from [facility], patient receivied SHingrix vaccine #1 on 5/21/18

## 2018-05-29 ENCOUNTER — TRANSFERRED RECORDS (OUTPATIENT)
Dept: HEALTH INFORMATION MANAGEMENT | Facility: CLINIC | Age: 68
End: 2018-05-29

## 2018-05-30 ENCOUNTER — TELEPHONE (OUTPATIENT)
Dept: INTERNAL MEDICINE | Facility: CLINIC | Age: 68
End: 2018-05-30

## 2018-05-30 NOTE — TELEPHONE ENCOUNTER
Would recommend taking HALF of her amlodipine (i.e. Decreasing dose from 10 to 5mg daily).     Can she come in for blood pressure check with me in ~2 weeks?

## 2018-05-30 NOTE — TELEPHONE ENCOUNTER
Patient had a colonoscopy done yesterday and her BP at the appointment was 94/46.  She also checked it once a week ago when at [facility] and her reading was 98/63.  Is asymptomatic.  States she feels fine.    Should pt be seen?   Make any medication changes?  Is currently taking amlodipine, atenolol, lisinopril and spironolactone for BP meds.

## 2018-05-31 ENCOUNTER — OFFICE VISIT (OUTPATIENT)
Dept: DERMATOLOGY | Facility: CLINIC | Age: 68
End: 2018-05-31
Payer: COMMERCIAL

## 2018-05-31 VITALS — HEART RATE: 68 BPM | OXYGEN SATURATION: 94 % | DIASTOLIC BLOOD PRESSURE: 64 MMHG | SYSTOLIC BLOOD PRESSURE: 98 MMHG

## 2018-05-31 DIAGNOSIS — C44.111 BASAL CELL CARCINOMA OF MEDIAL CANTHUS, RIGHT: Primary | ICD-10-CM

## 2018-05-31 PROCEDURE — 17312 MOHS ADDL STAGE: CPT | Performed by: DERMATOLOGY

## 2018-05-31 PROCEDURE — 13152 CMPLX RPR E/N/E/L 2.6-7.5 CM: CPT | Mod: 59 | Performed by: DERMATOLOGY

## 2018-05-31 PROCEDURE — 17311 MOHS 1 STAGE H/N/HF/G: CPT | Mod: 51 | Performed by: DERMATOLOGY

## 2018-05-31 PROCEDURE — 15260 FTH/GFT FR N/E/E/L 20 SQCM/<: CPT | Performed by: DERMATOLOGY

## 2018-05-31 NOTE — LETTER
5/31/2018         RE: Liz Haider  [address] [address] [place] [id number]        Dear Colleague,    Thank you for referring your patient, Liz Haider, to the [facility]. Please see a copy of my visit note below.    Liz Haider is a 67 year old year old female patient here today for evaluation and managment of basal cell carcinoma on right medial canthus.   .  Patient states this has been present for years.  Patient reports the following symptoms:  none .  Patient reports the following previous treatments bx in the past.  .  Patient reports the following modifying factors none.  Associated symptoms: none.  Patient has no other skin complaints today.  Remainder of the HPI, Meds, PMH, Allergies, FH, and SH was reviewed in chart.      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     Basal cell carcinoma      Benign essential hypertension      CAD (coronary artery disease) 2006    [facility]; acute NSTEMI, severe single vessel disease in RCA, PTCA/EUGEINO     VISHAL (generalized anxiety disorder)      History of alcohol abuse     sober since 1987     History of basal cell carcinoma     multiple around right eye     MDD (major depressive disorder)        Past Surgical History:   Procedure Laterality Date     COLONOSCOPY  4/20/2012    Procedure:COLONOSCOPY; COLONOSCOPY; Surgeon:EDSON SHELLEY; Location: GI     HYSTERECTOMY TOTAL ABDOMINAL  1988    for heavy periods     TONSILLECTOMY & ADENOIDECTOMY  1952        Family History   Problem Relation Age of Onset     Myocardial Infarction Father      later in life     Type 2 Diabetes Sister      Bladder Cancer Sister      smoking history     Dementia Sister      FTD     Aneurysm Sister      AAA     Dementia Mother      CEREBROVASCULAR DISEASE No family hx of      Coronary Artery Disease Early Onset No family hx of       Breast Cancer No family hx of      Ovarian Cancer No family hx of      Colon Cancer No family hx of        Social History     Social History     Marital status:      Spouse name: N/A     Number of children: N/A     Years of education: N/A     Occupational History     Retired - [employer]      Social History Main Topics     Smoking status: Former Smoker     Packs/day: 2.00     Years: 35.00     Types: Cigarettes     Quit date: 7/1/2006     Smokeless tobacco: Never Used     Alcohol use No      Comment: history of alcohol abuse; sober since 1987     Drug use: No     Sexual activity: No     Other Topics Concern     Parent/Sibling W/ Cabg, Mi Or Angioplasty Before 65f 55m? No     Social History Narrative    .    No kids.    Walks daily, but no formal exercise.         Outpatient Encounter Prescriptions as of 5/31/2018   Medication Sig Dispense Refill     amLODIPine (NORVASC) 10 MG tablet TAKE ONE TABLET BY MOUTH ONCE DAILY IN THE EVENING 90 tablet 3     ASPIRIN 81 MG OR TABS ONE DAILY 100 3     atenolol (TENORMIN) 100 MG tablet Take 100 mg by mouth daily       atorvastatin (LIPITOR) 40 MG tablet TAKE ONE TABLET BY MOUTH ONCE DAILY AT BEDTIME 90 tablet 1     bimatoprost (LATISSE) 0.03 % SOLN APPLY ONCE DAILY AT BEDTIME TO UPPER EYELID MARGIN WITH APPLICATOR (NOT LOWER EYELID) AS NEEDED. 3 mL 1     Biotin 1000 MCG CHEW Take 1,000 mcg by mouth daily       Calcium Carbonate-Vit D-Min (CALCIUM 1200 PO) Take 2 tablets by mouth.       Cholecalciferol (VITAMIN D PO) Take 1 tablet by mouth       fluocinonide (LIDEX) 0.05 % solution Apply to scalp and top of ears BID for 3-4 weeks. Do not use on face or body folds. 60 mL 0     hydrocortisone (WESTCORT) 0.2 % cream Apply sparingly to affected area three times daily for 14 days. 15 g 1     ketoconazole (NIZORAL) 2 % shampoo Wash hair daily. Lather and let sit for a few minutes on scalp and ears. Rinse. 120 mL 6     lisinopril (PRINIVIL/ZESTRIL)  20 MG tablet Take 1 tablet (20 mg) by mouth 2 times daily 180 tablet 1     LORazepam (ATIVAN) 0.5 MG tablet Take 1 tablet by mouth every 6 hours as needed for anxiety. 90 tablet 4     omeprazole (PRILOSEC) 20 MG CR capsule Take 1 capsule (20 mg) by mouth daily 90 capsule 1     sertraline (ZOLOFT) 100 MG tablet Take 1 tablet (100 mg) by mouth daily Rx'd by psych       spironolactone (ALDACTONE) 25 MG tablet TAKE ONE TABLET BY MOUTH ONCE DAILY IN THE MORNING 30 tablet 0     triamcinolone (KENALOG) 0.1 % cream Apply topically 2 times daily 80 g 3     triamcinolone (KENALOG) 0.1 % lotion Apply sparingly once or twice per day as needed to affected area until the skin is better, then stop; REPEAT AS NEEDED 60 mL 2     zolpidem (AMBIEN) 5 MG tablet Take 0.5-1 tablets (2.5-5 mg) by mouth nightly as needed for sleep       No facility-administered encounter medications on file as of 5/31/2018.              Review Of Systems  Skin: As above  Eyes: negative  Ears/Nose/Throat: negative  Respiratory: No shortness of breath, dyspnea on exertion, cough, or hemoptysis  Cardiovascular: negative  Gastrointestinal: negative  Genitourinary: negative  Musculoskeletal: negative  Neurologic: negative  Psychiatric: negative  Hematologic/Lymphatic/Immunologic: negative  Endocrine: negative      O:   NAD, WDWN, Alert & Oriented, Mood & Affect wnl, Vitals stable   Here today alone   BP 98/64  Pulse 68  SpO2 94%   General appearance normal   Vitals stable   Alert, oriented and in no acute distress     R medial canthus 1.6x1cm pink pearly papule       Eyes: Conjunctivae/lids:Normal     ENT: Lips, buccal mucosa, tongue: normal    MSK:Normal    Cardiovascular: peripheral edema none    Pulm: Breathing Normal    Neuro/Psych: Orientation:Normal; Mood/Affect:Normal      A/P:  1. R medial canthus basal cell carcinoma   MOHS:   Location    After PGACAC discussed with patient, decision for Mohs surgery was made. Indication for Mohs was Location.  Patient confirmed the site with Dr. Blakely.  After anesthesia with LEC, the tumor was excised using standard Mohs technique in 3 stages(s).  CLEAR MARGINS OBTAINED and Final defect size was 2.9 cm.     REPAIR COMPLEX: Because of the tightness of the surrounding skin and Because of the size and full thickness nature of the defect, a complex closure was planned. After LEC anesthesia and prep, Burow's triangles were excised in the relaxed skin tension lines. The wound edges were widely undermined by dissection in the subcutaneous plane until adequate tissue mobility was obtained. Hemostasis was obtained. The wound edges were closed in a layered fashion using Vicryl and Fast Absorbing Plain Gut sutures. Postoperative length was 4 cm.   EBL minimal; complications none; wound care routine.  The patient was discharged in good condition and will return in one week for wound evaluation.    This left a defect on medial canthus  REPAIR FTSG FROM OTHER: Because of the full-thickness nature of the defect and to avoid distortion, a full-thickness skin graft was planned. After LEC anesthesia and prep, a template was made of the defect and the graft was harvested from the burrows triangle. The graft was defatted and trimmed to fit the defect. It was sutured into place with Fast Absorbing Plain Gut suture and a taped Bolster dressing was applied.    The donor site was converted to a fusiform defect, and after hemostasis, was closed with subcutaneous stitches using Vicryl sutures.   EBL minimal; complications none; wound care routine.  The patient was discharged in good condition and will return on one week for wound evaluation.          BENIGN LESIONS DISCUSSED WITH PATIENT:  I discussed the specifics of tumor, prognosis, and genetics of benign lesions.  I explained that treatment of these lesions would be purely cosmetic and not medically neccessary.  I discussed with patient different removal options including excision, cautery  and /or laser.      Nature and genetics of benign skin lesions dicussed with patient.  Signs and Symptoms of skin cancer discussed with patient.  Patient encouraged to perform monthly skin exams.  UV precautions reviewed with patient.  Patient to follow up with Primary Care provider regarding elevated blood pressure.  Skin care regimen reviewed with patient: Eliminate harsh soaps, i.e. Dial, zest, irsih spring; Mild soaps such as Cetaphil or Dove sensitive skin, avoid hot or cold showers, aggressive use of emollients including vanicream, cetaphil or cerave discussed with patient.    Risks of non-melanoma skin cancer discussed with patient   Return to clinic 6m onths      Again, thank you for allowing me to participate in the care of your patient.        Sincerely,        Efraín Blakely MD

## 2018-05-31 NOTE — MR AVS SNAPSHOT
After Visit Summary   2018    Liz Haider    MRN: [id number]           Patient Information     Date Of Birth          1950        Visit Information        Provider Department      2018 7:15 AM Efraín Blakely MD [facility]        Today's Diagnoses     Basal cell carcinoma of medial canthus, right    -  1      Care Instructions    Skin Graft Wound Care     Dermatology Clinic [phone]     ? No strenuous activity for 48 hours. Resume moderate activity in 48 hours.  No heavy exercising until you are seen for follow up in one week.    ? Take Tylenol as needed for discomfort.                       ? No alcoholic beverages for 48 hours.    ? Leave the bandage in place until you come in for follow up in one week.  If the bandage becomes blood tinged or loose, reinforce it with gauze and tape.     ? Keep the bandage dry. Wash around it carefully.    ? If the tape becomes soiled or starts to come off, reinforce it with additional paper tape.    ? Do not smoke for 3 weeks; smoking is detrimental to wound healing and may cause the graft to die.    ? Avoid prolonged exposure to extremely cold temperatures for 3 weeks.    ? It is normal to have swelling and bruising around the surgical site. The bruising will fade in approximately 10-14 days. Elevate the area to reduce swelling.    ? Numbness, itchiness and sensitivity to temperature changes can occur after surgery and may take up to 18 months to normalize.    POSSIBLE COMPLICATIONS    BLEEDIN. Leave the bandage in place.  2. Use tightly rolled up gauze or a cloth to apply direct pressure over the bandage for 20 minutes.  3. Reapply pressure for an additional 20 minutes if necessary  4. Call the office or go to the nearest emergency room if pressure fails to stop the bleeding.  5. Use additional gauze and tape to maintain pressure once the bleeding has stopped.    PAIN:    1. Post operative pain should  slowly get better, beginning the evening after surgery.  2. A sudden or severe increase in pain may indicate a problem. Call the office if this occurs.    In case of emergency phone: Dermatology Clinic: [phone]   or Dr Blakely [phone]      Sutured Wound Care     [facility]: [phone]    [facility]: [phone]          ? No strenuous activity for 48 hours. Resume moderate activity in 48 hours. No heavy exercising until you are seen for follow up in one week.     ? Take Tylenol as needed for discomfort.                         ? Do not drink alcoholic beverages for 48 hours.     ? Keep the pressure bandage in place for 24 hours. If the bandage becomes blood tinged or loose, reinforce it with gauze and tape.        (Refer to the reverse side of this page for management of bleeding).    ? Remove pressure bandage in 24 hours     ? Leave the flat bandage in place until your follow up appointment.    ? Keep the bandage dry. Wash around it carefully.    ? If the tape becomes soiled or starts to come off, reinforce it with additional paper tape.    ? Do not smoke for 3 weeks; smoking is detrimental to wound healing.    ? It is normal to have swelling and bruising around the surgical site. The bruising will fade in approximately 10-14 days. Elevate the area to reduce swelling.    ? Numbness, itchiness and sensitivity to temperature changes can occur after surgery and may take up to 18 months to normalize.      POSSIBLE COMPLICATIONS    BLEEDIN. Leave the bandage in place.  7. Use tightly rolled up gauze or a cloth to apply direct pressure over the bandage for 20   minutes.  8. Reapply pressure for an additional 20 minutes if necessary  9. Call the office or go to the nearest emergency room if pressure fails to stop the bleeding.  10. Use additional gauze and tape to maintain pressure once the bleeding has stopped.        PAIN:    3. Post operative pain should slowly get better,  never worse.  4. A severe increase in pain may indicate a problem. Call the office if this occurs.    In case of emergency phone:Dr Blakely [phone]            Follow-ups after your visit        Who to contact     If you have questions or need follow up information about today's clinic visit or your schedule please contact [facility] directly at [phone].  Normal or non-critical lab and imaging results will be communicated to you by [employer], letter or phone within 4 business days after the clinic has received the results. If you do not hear from us within 7 days, please contact the clinic through [employer] or phone. If you have a critical or abnormal lab result, we will notify you by phone as soon as possible.  Submit refill requests through [employer] or call your pharmacy and they will forward the refill request to us. Please allow 3 business days for your refill to be completed.          Additional Information About Your Visit        [employer] Information     [employer] gives you secure access to your electronic health record. If you see a primary care provider, you can also send messages to your care team and make appointments. If you have questions, please call your primary care clinic.  If you do not have a primary care provider, please call [phone] and they will assist you.        Care EveryWhere ID     This is your Care EveryWhere ID. This could be used by other organizations to access your [facility] medical records  [id number]        Your Vitals Were     Pulse Pulse Oximetry                68 94%           Blood Pressure from Last 3 Encounters:   05/31/18 98/64   05/11/18 102/67   04/17/18 124/60    Weight from Last 3 Encounters:   05/11/18 69.7 kg (153 lb 9.6 oz)   04/17/18 70.2 kg (154 lb 11.2 oz)   01/12/18 71 kg (156 lb 9.6 oz)              We Performed the Following     [address] - FULL THICK GRAFT HEAD/AXIL/GEN/HND/FT EA ADDTL 20 CM OR LESS     MOHS HEAD/NCK/HND/FT/GEN 1ST  STAGE UP T0 5 BLOCKS     MOHS HEAD/NCK/HND/FT/GEN EA ADDTL STAGE UP T0 5 BLOCKS     REPAIR COMPLEX, WOUND LID/NOSE/EAR/LIP 2.6-7.5 CM        Primary Care Provider Office Phone # Fax #    Sydnee Miller [phone]       [address] [facility] [address]        Equal Access to Services     RONAN BLANK : Hadii aad ku hadasho Soomaali, waaxda luqadaha, qaybta kaalmada adeegyada, waxay idiin hayaan adeeg kharash la'aan ah. So [facility] [phone].    ATENCIÓN: Si habla español, tiene a medrano disposición servicios gratuitos de asistencia lingüística. Corettaame al [phone].    We comply with applicable federal civil rights laws and [place] laws. We do not discriminate on the basis of race, color, national origin, age, disability, sex, sexual orientation, or gender identity.            Thank you!     Thank you for choosing [facility]  for your care. Our goal is always to provide you with excellent care. Hearing back from our patients is one way we can continue to improve our services. Please take a few minutes to complete the written survey that you may receive in the mail after your visit with us. Thank you!             Your Updated Medication List - Protect others around you: Learn how to safely use, store and throw away your medicines at www.disposemymeds.org.          This list is accurate as of 5/31/18  1:45 PM.  Always use your most recent med list.                   Brand Name Dispense Instructions for use Diagnosis    AMBIEN 5 MG tablet   Generic drug:  zolpidem      Take 0.5-1 tablets (2.5-5 mg) by mouth nightly as needed for sleep        amLODIPine 10 MG tablet    NORVASC    90 tablet    TAKE ONE TABLET BY MOUTH ONCE DAILY IN THE EVENING    Essential hypertension       aspirin 81 MG tablet     100    ONE DAILY    Unspecified cardiovascular disease       atenolol 100 MG tablet    TENORMIN     Take 100 mg by mouth daily        atorvastatin 40 MG tablet    LIPITOR    90  tablet    TAKE ONE TABLET BY MOUTH ONCE DAILY AT BEDTIME    Hyperlipidemia LDL goal <100       bimatoprost 0.03 % Soln    latisse    3 mL    APPLY ONCE DAILY AT BEDTIME TO UPPER EYELID MARGIN WITH APPLICATOR (NOT LOWER EYELID) AS NEEDED.    Hypotrichosis of eyelid, unspecified laterality       Biotin 1000 MCG Chew      Take 1,000 mcg by mouth daily        CALCIUM 1200 PO      Take 2 tablets by mouth.    Routine general medical examination at a health care facility, Unspecified essential hypertension, Other and unspecified hyperlipidemia, Acute myocardial infarction, unspecified site, subsequent episode of care, Sleep disturbance, unspecified, Generalized anxiety disorder, AAA (abdominal aortic aneurysm) (H)       fluocinonide 0.05 % solution    LIDEX    60 mL    Apply to scalp and top of ears BID for 3-4 weeks. Do not use on face or body folds.    Dermatitis, seborrheic       hydrocortisone 0.2 % cream    WESTCORT    15 g    Apply sparingly to affected area three times daily for 14 days.    Rash and nonspecific skin eruption       ketoconazole 2 % shampoo    NIZORAL    120 mL    Wash hair daily. Lather and let sit for a few minutes on scalp and ears. Rinse.    Dermatitis, seborrheic       lisinopril 20 MG tablet    PRINIVIL/ZESTRIL    180 tablet    Take 1 tablet (20 mg) by mouth 2 times daily    Benign essential hypertension       LORazepam 0.5 MG tablet    ATIVAN    90 tablet    Take 1 tablet by mouth every 6 hours as needed for anxiety.    Sleep disturbance, unspecified, Acute myocardial infarction, unspecified site, subsequent episode of care, Unspecified essential hypertension, Other and unspecified hyperlipidemia, Generalized anxiety disorder, Routine general medical examination at a health care facility, AAA (abdominal aortic aneurysm) (H)       omeprazole 20 MG CR capsule    priLOSEC    90 capsule    Take 1 capsule (20 mg) by mouth daily    Gastroesophageal reflux disease without esophagitis       sertraline  100 MG tablet    ZOLOFT     Take 1 tablet (100 mg) by mouth daily Rx'd by psych    Generalized anxiety disorder       spironolactone 25 MG tablet    ALDACTONE    30 tablet    TAKE ONE TABLET BY MOUTH ONCE DAILY IN THE MORNING    Essential hypertension       * triamcinolone 0.1 % lotion    KENALOG    60 mL    Apply sparingly once or twice per day as needed to affected area until the skin is better, then stop; REPEAT AS NEEDED    Psoriasis       * triamcinolone 0.1 % cream    KENALOG    80 g    Apply topically 2 times daily    Eczema, unspecified type       VITAMIN D PO      Take 1 tablet by mouth        * Notice:  This list has 2 medication(s) that are the same as other medications prescribed for you. Read the directions carefully, and ask your doctor or other care provider to review them with you.

## 2018-05-31 NOTE — PROGRESS NOTES
Liz Haider is a 67 year old year old female patient here today for evaluation and managment of basal cell carcinoma on right medial canthus.   .  Patient states this has been present for years.  Patient reports the following symptoms:  none .  Patient reports the following previous treatments bx in the past.  .  Patient reports the following modifying factors none.  Associated symptoms: none.  Patient has no other skin complaints today.  Remainder of the HPI, Meds, PMH, Allergies, FH, and SH was reviewed in chart.      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     Basal cell carcinoma      Benign essential hypertension      CAD (coronary artery disease) 2006    [facility]; acute NSTEMI, severe single vessel disease in RCA, PTCA/EUGENIO     VISHAL (generalized anxiety disorder)      History of alcohol abuse     sober since 1987     History of basal cell carcinoma     multiple around right eye     MDD (major depressive disorder)        Past Surgical History:   Procedure Laterality Date     COLONOSCOPY  4/20/2012    Procedure:COLONOSCOPY; COLONOSCOPY; Surgeon:EDSON SHELLEY; Location:[facility]     HYSTERECTOMY TOTAL ABDOMINAL  1988    for heavy periods     TONSILLECTOMY & ADENOIDECTOMY  1952        Family History   Problem Relation Age of Onset     Myocardial Infarction Father      later in life     Type 2 Diabetes Sister      Bladder Cancer Sister      smoking history     Dementia Sister      FTD     Aneurysm Sister      AAA     Dementia Mother      CEREBROVASCULAR DISEASE No family hx of      Coronary Artery Disease Early Onset No family hx of      Breast Cancer No family hx of      Ovarian Cancer No family hx of      Colon Cancer No family hx of        Social History     Social History     Marital status:      Spouse name: N/A     Number of children: N/A     Years of education: N/A      Occupational History     Retired - [employer]      Social History Main Topics     Smoking status: Former Smoker     Packs/day: 2.00     Years: 35.00     Types: Cigarettes     Quit date: 7/1/2006     Smokeless tobacco: Never Used     Alcohol use No      Comment: history of alcohol abuse; sober since 1987     Drug use: No     Sexual activity: No     Other Topics Concern     Parent/Sibling W/ Cabg, Mi Or Angioplasty Before 65f 55m? No     Social History Narrative    .    No kids.    Walks daily, but no formal exercise.         Outpatient Encounter Prescriptions as of 5/31/2018   Medication Sig Dispense Refill     amLODIPine (NORVASC) 10 MG tablet TAKE ONE TABLET BY MOUTH ONCE DAILY IN THE EVENING 90 tablet 3     ASPIRIN 81 MG OR TABS ONE DAILY 100 3     atenolol (TENORMIN) 100 MG tablet Take 100 mg by mouth daily       atorvastatin (LIPITOR) 40 MG tablet TAKE ONE TABLET BY MOUTH ONCE DAILY AT BEDTIME 90 tablet 1     bimatoprost (LATISSE) 0.03 % SOLN APPLY ONCE DAILY AT BEDTIME TO UPPER EYELID MARGIN WITH APPLICATOR (NOT LOWER EYELID) AS NEEDED. 3 mL 1     Biotin 1000 MCG CHEW Take 1,000 mcg by mouth daily       Calcium Carbonate-Vit D-Min (CALCIUM 1200 PO) Take 2 tablets by mouth.       Cholecalciferol (VITAMIN D PO) Take 1 tablet by mouth       fluocinonide (LIDEX) 0.05 % solution Apply to scalp and top of ears BID for 3-4 weeks. Do not use on face or body folds. 60 mL 0     hydrocortisone (WESTCORT) 0.2 % cream Apply sparingly to affected area three times daily for 14 days. 15 g 1     ketoconazole (NIZORAL) 2 % shampoo Wash hair daily. Lather and let sit for a few minutes on scalp and ears. Rinse. 120 mL 6     lisinopril (PRINIVIL/ZESTRIL) 20 MG tablet Take 1 tablet (20 mg) by mouth 2 times daily 180 tablet 1     LORazepam (ATIVAN) 0.5 MG tablet Take 1 tablet by mouth every 6 hours as needed for anxiety. 90 tablet 4     omeprazole (PRILOSEC) 20 MG CR capsule Take 1 capsule (20 mg) by  mouth daily 90 capsule 1     sertraline (ZOLOFT) 100 MG tablet Take 1 tablet (100 mg) by mouth daily Rx'd by psych       spironolactone (ALDACTONE) 25 MG tablet TAKE ONE TABLET BY MOUTH ONCE DAILY IN THE MORNING 30 tablet 0     triamcinolone (KENALOG) 0.1 % cream Apply topically 2 times daily 80 g 3     triamcinolone (KENALOG) 0.1 % lotion Apply sparingly once or twice per day as needed to affected area until the skin is better, then stop; REPEAT AS NEEDED 60 mL 2     zolpidem (AMBIEN) 5 MG tablet Take 0.5-1 tablets (2.5-5 mg) by mouth nightly as needed for sleep       No facility-administered encounter medications on file as of 5/31/2018.              Review Of Systems  Skin: As above  Eyes: negative  Ears/Nose/Throat: negative  Respiratory: No shortness of breath, dyspnea on exertion, cough, or hemoptysis  Cardiovascular: negative  Gastrointestinal: negative  Genitourinary: negative  Musculoskeletal: negative  Neurologic: negative  Psychiatric: negative  Hematologic/Lymphatic/Immunologic: negative  Endocrine: negative      O:   NAD, WDWN, Alert & Oriented, Mood & Affect wnl, Vitals stable   Here today alone   BP 98/64  Pulse 68  SpO2 94%   General appearance normal   Vitals stable   Alert, oriented and in no acute distress     R medial canthus 1.6x1cm pink pearly papule       Eyes: Conjunctivae/lids:Normal     ENT: Lips, buccal mucosa, tongue: normal    MSK:Normal    Cardiovascular: peripheral edema none    Pulm: Breathing Normal    Neuro/Psych: Orientation:Normal; Mood/Affect:Normal      A/P:  1. R medial canthus basal cell carcinoma   MOHS:   Location    After PGACAC discussed with patient, decision for Mohs surgery was made. Indication for Mohs was Location. Patient confirmed the site with Dr. Blakely.  After anesthesia with LEC, the tumor was excised using standard Mohs technique in 3 stages(s).  CLEAR MARGINS OBTAINED and Final defect size was 2.9 cm.     REPAIR COMPLEX: Because of the tightness of the  surrounding skin and Because of the size and full thickness nature of the defect, a complex closure was planned. After LEC anesthesia and prep, Burow's triangles were excised in the relaxed skin tension lines. The wound edges were widely undermined by dissection in the subcutaneous plane until adequate tissue mobility was obtained. Hemostasis was obtained. The wound edges were closed in a layered fashion using Vicryl and Fast Absorbing Plain Gut sutures. Postoperative length was 4 cm.   EBL minimal; complications none; wound care routine.  The patient was discharged in good condition and will return in one week for wound evaluation.    This left a defect on medial canthus  REPAIR FTSG FROM OTHER: Because of the full-thickness nature of the defect and to avoid distortion, a full-thickness skin graft was planned. After LEC anesthesia and prep, a template was made of the defect and the graft was harvested from the burrows triangle. The graft was defatted and trimmed to fit the defect. It was sutured into place with Fast Absorbing Plain Gut suture and a taped Bolster dressing was applied.    The donor site was converted to a fusiform defect, and after hemostasis, was closed with subcutaneous stitches using Vicryl sutures.   EBL minimal; complications none; wound care routine.  The patient was discharged in good condition and will return on one week for wound evaluation.          BENIGN LESIONS DISCUSSED WITH PATIENT:  I discussed the specifics of tumor, prognosis, and genetics of benign lesions.  I explained that treatment of these lesions would be purely cosmetic and not medically neccessary.  I discussed with patient different removal options including excision, cautery and /or laser.      Nature and genetics of benign skin lesions dicussed with patient.  Signs and Symptoms of skin cancer discussed with patient.  Patient encouraged to perform monthly skin exams.  UV precautions reviewed with patient.  Patient to  follow up with Primary Care provider regarding elevated blood pressure.  Skin care regimen reviewed with patient: Eliminate harsh soaps, i.e. Dial, zest, irsih spring; Mild soaps such as Cetaphil or Dove sensitive skin, avoid hot or cold showers, aggressive use of emollients including vanicream, cetaphil or cerave discussed with patient.    Risks of non-melanoma skin cancer discussed with patient   Return to clinic 6m onths

## 2018-05-31 NOTE — NURSING NOTE
"Initial BP 98/64  Pulse 68  SpO2 94% Estimated body mass index is 28.55 kg/(m^2) as calculated from the following:    Height as of 4/17/18: 1.562 m (5' 1.5\").    Weight as of 5/11/18: 69.7 kg (153 lb 9.6 oz). .  REBECA David-BSN  [facility]  [phone]  "

## 2018-05-31 NOTE — PATIENT INSTRUCTIONS
Skin Graft Wound Care     Dermatology Clinic [phone]     ? No strenuous activity for 48 hours. Resume moderate activity in 48 hours.  No heavy exercising until you are seen for follow up in one week.    ? Take Tylenol as needed for discomfort.                       ? No alcoholic beverages for 48 hours.    ? Leave the bandage in place until you come in for follow up in one week.  If the bandage becomes blood tinged or loose, reinforce it with gauze and tape.     ? Keep the bandage dry. Wash around it carefully.    ? If the tape becomes soiled or starts to come off, reinforce it with additional paper tape.    ? Do not smoke for 3 weeks; smoking is detrimental to wound healing and may cause the graft to die.    ? Avoid prolonged exposure to extremely cold temperatures for 3 weeks.    ? It is normal to have swelling and bruising around the surgical site. The bruising will fade in approximately 10-14 days. Elevate the area to reduce swelling.    ? Numbness, itchiness and sensitivity to temperature changes can occur after surgery and may take up to 18 months to normalize.    POSSIBLE COMPLICATIONS    BLEEDIN. Leave the bandage in place.  2. Use tightly rolled up gauze or a cloth to apply direct pressure over the bandage for 20 minutes.  3. Reapply pressure for an additional 20 minutes if necessary  4. Call the office or go to the nearest emergency room if pressure fails to stop the bleeding.  5. Use additional gauze and tape to maintain pressure once the bleeding has stopped.    PAIN:    1. Post operative pain should slowly get better, beginning the evening after surgery.  2. A sudden or severe increase in pain may indicate a problem. Call the office if this occurs.    In case of emergency phone: Dermatology Clinic: [phone]   or Dr Blakely [phone]      Sutured Wound Care     [facility]: [phone]    [facility]: [phone]          ? No strenuous activity for 48 hours.  Resume moderate activity in 48 hours. No heavy exercising until you are seen for follow up in one week.     ? Take Tylenol as needed for discomfort.                         ? Do not drink alcoholic beverages for 48 hours.     ? Keep the pressure bandage in place for 24 hours. If the bandage becomes blood tinged or loose, reinforce it with gauze and tape.        (Refer to the reverse side of this page for management of bleeding).    ? Remove pressure bandage in 24 hours     ? Leave the flat bandage in place until your follow up appointment.    ? Keep the bandage dry. Wash around it carefully.    ? If the tape becomes soiled or starts to come off, reinforce it with additional paper tape.    ? Do not smoke for 3 weeks; smoking is detrimental to wound healing.    ? It is normal to have swelling and bruising around the surgical site. The bruising will fade in approximately 10-14 days. Elevate the area to reduce swelling.    ? Numbness, itchiness and sensitivity to temperature changes can occur after surgery and may take up to 18 months to normalize.      POSSIBLE COMPLICATIONS    BLEEDIN. Leave the bandage in place.  7. Use tightly rolled up gauze or a cloth to apply direct pressure over the bandage for 20   minutes.  8. Reapply pressure for an additional 20 minutes if necessary  9. Call the office or go to the nearest emergency room if pressure fails to stop the bleeding.  10. Use additional gauze and tape to maintain pressure once the bleeding has stopped.        PAIN:    3. Post operative pain should slowly get better, never worse.  4. A severe increase in pain may indicate a problem. Call the office if this occurs.    In case of emergency phone:Dr Blakely [phone]

## 2018-06-01 ENCOUNTER — NURSE TRIAGE (OUTPATIENT)
Dept: NURSING | Facility: CLINIC | Age: 68
End: 2018-06-01

## 2018-06-01 ENCOUNTER — TELEPHONE (OUTPATIENT)
Dept: DERMATOLOGY | Facility: CLINIC | Age: 68
End: 2018-06-01

## 2018-06-01 ENCOUNTER — OFFICE VISIT (OUTPATIENT)
Dept: DERMATOLOGY | Facility: CLINIC | Age: 68
End: 2018-06-01
Payer: COMMERCIAL

## 2018-06-01 VITALS — HEART RATE: 61 BPM | OXYGEN SATURATION: 98 % | DIASTOLIC BLOOD PRESSURE: 77 MMHG | SYSTOLIC BLOOD PRESSURE: 123 MMHG

## 2018-06-01 DIAGNOSIS — R58 BLEEDING: Primary | ICD-10-CM

## 2018-06-01 PROCEDURE — 99024 POSTOP FOLLOW-UP VISIT: CPT | Performed by: PHYSICIAN ASSISTANT

## 2018-06-01 NOTE — TELEPHONE ENCOUNTER
Reason for Call:  Same Day Appointment, Requested Provider:    Reanna Duarte PA-C    PCP: Sydnee Miller    Reason for visit: post surgical bleeding    Duration of symptoms:05.31.18     Have you been treated for this in the past? Yes    Additional comments:   pt had a MOHS treatment yesterday, 5.31.18.  Her eye area is swollen and can't see out of it and the wound continues to bleed.  She states she spoke to Dr Blakely, and he indicated to her to call the clinic and see the PA on staff today to look at wound, and re bandage it.    Can we leave a detailed message on this number? Not Applicable - pls talk to patient    Phone number patient can be reached at: Home number on file [phone] (home)    Best Time: anytime    Call taken on 6/1/2018 at 8:11 AM by Lydia Helms

## 2018-06-01 NOTE — TELEPHONE ENCOUNTER
Called and spoke to patient. Patient stated her eye is swollen shut, very bruised, and is experiencing a lot of breakthrough bleeding. Patient is scheduled to come see nurse and provider this afternoon. Patient voiced understanding.  REBECA David-BSN  [facility]  [phone]

## 2018-06-01 NOTE — LETTER
6/1/2018         RE: Liz Haider  [address] [address] [place] [id number]        Dear Colleague,    Thank you for referring your patient, Liz Haider, to the [facility]. Please see a copy of my visit note below.    Pt healing well; small amount of bleeding from site. Will rebandage with pressure bandage; pt to follow up on Monday.     Again, thank you for allowing me to participate in the care of your patient.        Sincerely,        Reanna Bingham PA-C

## 2018-06-01 NOTE — TELEPHONE ENCOUNTER
Caller had eye surgery and states this morning her eye is oozing blood and is unable to stop the oozing with applied pressure. Triager offered to page on call Derm provider, and caller states she would call Derm provider cause she has his direct number.

## 2018-06-01 NOTE — TELEPHONE ENCOUNTER
Reason for Disposition    Bleeding on white of the eye    Additional Information    Negative: SEVERE eye pain (e.g., excruciating)    Negative: [1] Eyelids are very swollen (shut or almost) AND [2] fever    Negative: [1] Eyelid (outer) is very red (or tender to touch) AND [2] fever    Negative: Patient sounds very sick or weak to the triager    Negative: MODERATE eye pain (e.g., interferes with normal activities)    Negative: Fever > 104 F (40 C)    Negative: Cloudy spot or sore seen on the cornea (clear part of the eye)    Negative: Blurred vision    Negative: Eye is very swollen (shut or almost)    Negative: [1] MILD eye pain or discomfort AND [2] wears contacts    Negative: Eyelid is red and painful (or tender to touch)    Negative: Discharge from penis    Negative: New or abnormal vaginal discharge    Negative: Fever present > 3 days (72 hours)    Negative: [1] Lots of yellow or green nasal discharge AND [2] present now AND [3] fever    Negative: Weak immune system (e.g., HIV positive, cancer chemo, splenectomy, organ transplant, chronic steroids)    Negative: [1] Eye with yellow/green discharge or eyelashes stick together AND [2] NO PCP standing order to call in antibiotic eye drops    Negative: [1] Using antibiotic eyedrops AND [2] eyes have become very itchy (especially after eyedrops are put in)    Negative: [1] Taking oral antibiotic > 48 hours (2 days) AND [2] pus in eye persists    Negative: [1] Using antibiotic eyedrops > 72 hours (3 days) AND [2] pus in eye persists    Protocols used: EYE - PUS OR DISCHARGE-ADULT-  Caller states eye is swollen shut due to clotted blood. Does have suture in eye.

## 2018-06-03 NOTE — PROGRESS NOTES
Pt healing well; small amount of bleeding from site. Will rebandage with pressure bandage; pt to follow up on Monday.

## 2018-06-04 ENCOUNTER — ALLIED HEALTH/NURSE VISIT (OUTPATIENT)
Dept: DERMATOLOGY | Facility: CLINIC | Age: 68
End: 2018-06-04
Payer: COMMERCIAL

## 2018-06-04 DIAGNOSIS — Z48.01 ENCOUNTER FOR CHANGE OR REMOVAL OF SURGICAL WOUND DRESSING: Primary | ICD-10-CM

## 2018-06-04 PROCEDURE — 99207 ZZC NO CHARGE NURSE ONLY: CPT

## 2018-06-04 NOTE — NURSING NOTE
Pt returned to clinic for post surgery 1 week follow up bandage change. Pt has no complaints, denies pain. Bandage removed from Medial [place]hus, area cleansed with normal saline. Site is healing and wound edges approximating well. Reapplied new steri strips and paper tape.    Advised to watch for signs/sx of infection; spreading redness, drainage, odor, fever. Call or report promptly to clinic. Pt given written instructions and informed to rtc as needed. Patient verbalized understanding.     Joann RN-BSN  [facility]  [phone]

## 2018-06-04 NOTE — PATIENT INSTRUCTIONS
WOUND CARE INSTRUCTIONS  for  SUTURE REMOVAL      If there are any open or bleeding areas at the incision site you should begin to cover the area with a bandage daily as follows:    1) Clean and dry the area with plain tap water using a Q-tip or sterile gauze pad.  2) Apply Vaseline, Aquaphor, Polysporin or Bacitracin ointment to the open area.  3) Cover the wound with a band-aid or a sterile non-stick gauze pad and micropore paper tape.       *Once the bandages are removed, the scar will be red and firm (especially in the lip/chin area). This is normal and will fade in time. It might take 6-12 months for this to happen.     *Massaging the area will help the scar soften and fade quicker. Begin to massage the area in one month. To massage apply pressure directly and firmly over the scar with the fingertips and move in a circular motion. Massage the area for a few minutes several times a day. Continue to massage the site for several months.    *Numbness in the surgical area is expected. It might take 12-18 months for the feeling to return to normal. During this time sensations of itchiness, tingling and occasional sharp pains might be noted. These feelings are normal and will subside once the nerves have completely healed.

## 2018-06-04 NOTE — MR AVS SNAPSHOT
After Visit Summary   6/4/2018    Liz Haider    MRN: [id number]           Patient Information     Date Of Birth          1950        Visit Information        Provider Department      6/4/2018 10:00 AM OX DERM NURSE [facility]        Today's Diagnoses     Encounter for change or removal of surgical wound dressing    -  1      Care Instructions    WOUND CARE INSTRUCTIONS  for  SUTURE REMOVAL      If there are any open or bleeding areas at the incision site you should begin to cover the area with a bandage daily as follows:    1) Clean and dry the area with plain tap water using a Q-tip or sterile gauze pad.  2) Apply Vaseline, Aquaphor, Polysporin or Bacitracin ointment to the open area.  3) Cover the wound with a band-aid or a sterile non-stick gauze pad and micropore paper tape.       *Once the bandages are removed, the scar will be red and firm (especially in the lip/chin area). This is normal and will fade in time. It might take 6-12 months for this to happen.     *Massaging the area will help the scar soften and fade quicker. Begin to massage the area in one month. To massage apply pressure directly and firmly over the scar with the fingertips and move in a circular motion. Massage the area for a few minutes several times a day. Continue to massage the site for several months.    *Numbness in the surgical area is expected. It might take 12-18 months for the feeling to return to normal. During this time sensations of itchiness, tingling and occasional sharp pains might be noted. These feelings are normal and will subside once the nerves have completely healed.           Follow-ups after your visit        Your next 10 appointments already scheduled     Jun 06, 2018  9:00 AM CDT   Nurse Only with OX DERM NURSE   [facility] ([facility])    [address] [address] [phone]            Jun 12,  2018  8:30 AM CDT   Office Visit with Sydnee Miller MD   [facility]    [address] [address] [phone]           Bring a current list of meds and any records pertaining to this visit. For Physicals, please bring immunization records and any forms needing to be filled out. Please arrive 10 minutes early to complete paperwork.              Who to contact     If you have questions or need follow up information about today's clinic visit or your schedule please contact [facility] directly at [phone].  Normal or non-critical lab and imaging results will be communicated to you by MyChart, letter or phone within 4 business days after the clinic has received the results. If you do not hear from us within 7 days, please contact the clinic through [employer] or phone. If you have a critical or abnormal lab result, we will notify you by phone as soon as possible.  Submit refill requests through [employer] or call your pharmacy and they will forward the refill request to us. Please allow 3 business days for your refill to be completed.          Additional Information About Your Visit        MyChart Information     [employer] gives you secure access to your electronic health record. If you see a primary care provider, you can also send messages to your care team and make appointments. If you have questions, please call your primary care clinic.  If you do not have a primary care provider, please call [phone] and they will assist you.        Care EveryWhere ID     This is your Care EveryWhere ID. This could be used by other organizations to access your [facility] medical records  [id number]         Blood Pressure from Last 3 Encounters:   06/01/18 123/77   05/31/18 98/64   05/11/18 102/67    Weight from Last 3 Encounters:   05/11/18 69.7 kg (153 lb 9.6 oz)   04/17/18 70.2 kg (154 lb 11.2 oz)   01/12/18 71 kg  (156 lb 9.6 oz)              Today, you had the following     No orders found for display       Primary Care Provider Office Phone # Fax #    Sydnee Miller [phone]       [address] [facility] [address]        Equal Access to Services     RONAN BLANK : Hadii aad ku hadasho Soomaali, waaxda luqadaha, qaybta kaalmada adeegyada, waxay idiin hayaan adeeg marii laedelmira salazar. So [facility] [phone].    ATENCIÓN: Si habla español, tiene a medrano disposición servicios gratuitos de asistencia lingüística. Llame al [phone].    We comply with applicable federal civil rights laws and [place] laws. We do not discriminate on the basis of race, color, national origin, age, disability, sex, sexual orientation, or gender identity.            Thank you!     Thank you for choosing [facility]  for your care. Our goal is always to provide you with excellent care. Hearing back from our patients is one way we can continue to improve our services. Please take a few minutes to complete the written survey that you may receive in the mail after your visit with us. Thank you!             Your Updated Medication List - Protect others around you: Learn how to safely use, store and throw away your medicines at www.disposemymeds.org.          This list is accurate as of 6/4/18  5:52 PM.  Always use your most recent med list.                   Brand Name Dispense Instructions for use Diagnosis    AMBIEN 5 MG tablet   Generic drug:  zolpidem      Take 0.5-1 tablets (2.5-5 mg) by mouth nightly as needed for sleep        amLODIPine 10 MG tablet    NORVASC    90 tablet    TAKE ONE TABLET BY MOUTH ONCE DAILY IN THE EVENING    Essential hypertension       aspirin 81 MG tablet     100    ONE DAILY    Unspecified cardiovascular disease       atenolol 100 MG tablet    TENORMIN     Take 100 mg by mouth daily        atorvastatin 40 MG tablet    LIPITOR    90 tablet    TAKE ONE TABLET BY MOUTH ONCE DAILY AT  BEDTIME    Hyperlipidemia LDL goal <100       bimatoprost 0.03 % Soln    latisse    3 mL    APPLY ONCE DAILY AT BEDTIME TO UPPER EYELID MARGIN WITH APPLICATOR (NOT LOWER EYELID) AS NEEDED.    Hypotrichosis of eyelid, unspecified laterality       Biotin 1000 MCG Chew      Take 1,000 mcg by mouth daily        CALCIUM 1200 PO      Take 2 tablets by mouth.    Routine general medical examination at a health care facility, Unspecified essential hypertension, Other and unspecified hyperlipidemia, Acute myocardial infarction, unspecified site, subsequent episode of care, Sleep disturbance, unspecified, Generalized anxiety disorder, AAA (abdominal aortic aneurysm) (H)       fluocinonide 0.05 % solution    LIDEX    60 mL    Apply to scalp and top of ears BID for 3-4 weeks. Do not use on face or body folds.    Dermatitis, seborrheic       hydrocortisone 0.2 % cream    WESTCORT    15 g    Apply sparingly to affected area three times daily for 14 days.    Rash and nonspecific skin eruption       ketoconazole 2 % shampoo    NIZORAL    120 mL    Wash hair daily. Lather and let sit for a few minutes on scalp and ears. Rinse.    Dermatitis, seborrheic       lisinopril 20 MG tablet    PRINIVIL/ZESTRIL    180 tablet    Take 1 tablet (20 mg) by mouth 2 times daily    Benign essential hypertension       LORazepam 0.5 MG tablet    ATIVAN    90 tablet    Take 1 tablet by mouth every 6 hours as needed for anxiety.    Sleep disturbance, unspecified, Acute myocardial infarction, unspecified site, subsequent episode of care, Unspecified essential hypertension, Other and unspecified hyperlipidemia, Generalized anxiety disorder, Routine general medical examination at a health care facility, AAA (abdominal aortic aneurysm) (H)       omeprazole 20 MG CR capsule    priLOSEC    90 capsule    Take 1 capsule (20 mg) by mouth daily    Gastroesophageal reflux disease without esophagitis       sertraline 100 MG tablet    ZOLOFT     Take 1 tablet (100  mg) by mouth daily Rx'd by psych    Generalized anxiety disorder       spironolactone 25 MG tablet    ALDACTONE    30 tablet    TAKE ONE TABLET BY MOUTH ONCE DAILY IN THE MORNING    Essential hypertension       * triamcinolone 0.1 % lotion    KENALOG    60 mL    Apply sparingly once or twice per day as needed to affected area until the skin is better, then stop; REPEAT AS NEEDED    Psoriasis       * triamcinolone 0.1 % cream    KENALOG    80 g    Apply topically 2 times daily    Eczema, unspecified type       VITAMIN D PO      Take 1 tablet by mouth        * Notice:  This list has 2 medication(s) that are the same as other medications prescribed for you. Read the directions carefully, and ask your doctor or other care provider to review them with you.

## 2018-06-06 ENCOUNTER — ALLIED HEALTH/NURSE VISIT (OUTPATIENT)
Dept: DERMATOLOGY | Facility: CLINIC | Age: 68
End: 2018-06-06
Payer: COMMERCIAL

## 2018-06-06 DIAGNOSIS — Z48.01 ENCOUNTER FOR CHANGE OR REMOVAL OF SURGICAL WOUND DRESSING: Primary | ICD-10-CM

## 2018-06-06 PROCEDURE — 99207 ZZC NO CHARGE NURSE ONLY: CPT

## 2018-06-06 NOTE — MR AVS SNAPSHOT
After Visit Summary   6/6/2018    Liz Haider    MRN: [id number]           Patient Information     Date Of Birth          1950        Visit Information        Provider Department      6/6/2018 9:00 AM [facility] NURSE [facility]        Today's Diagnoses     Encounter for change or removal of surgical wound dressing    -  1      Care Instructions    ONE WEEK DRESSING CHANGE  for  SKIN GRAFTS  The following information has been compiled to offer you assistance with the dressing change or wound evaluation. Please feel free to call our office to speak with one of the nurses if you have any questions or concerns about the progress of the wound healing process especially if there are any signs of graft necrosis or infection. We will be happy to answer any questions you might have.                                                               AFTER 24 HOURS YOU SHOULD REMOVE THE BANDAGE AND BEGIN DAILY DRESSING CHANGES AS FOLLOWS:     1) Remove Dressing.     2) Clean and dry the area with tap water using a Q-tip or sterile gauze pad.     3) Apply Vaseline, Polysporin ointment, Aquaphor or Bacitracin ointment over entire wound.  Do NOT use Neosporin ointment.     4) Cover the wound with a band-aid, or a sterile non-stick gauze pad and micropore paper tape      REPEAT THESE INSTRUCTIONS AT LEAST ONCE A DAY UNTIL THE WOUND HAS COMPLETELY HEALED. DO NOT LET THE WOUND SCAB OVER.    It is an old wives tale that a wound heals better when it is exposed to air and allowed to dry out. The wound will heal faster with a better cosmetic result if it is kept moist with ointment and covered with a bandage.       Massaging the wound site hastens the healing process by softening the scar tissue and fading the scar. Begin massaging the area one month after surgery as often as possible. Continue to massage the area for 2-3 months or until you feel the scar tissue has softened. Moisturizers can be  used during the massaging but are not necessary. Ultimately it takes six months for the graft to heal and blend into the surrounding skin.          Follow-ups after your visit        Your next 10 appointments already scheduled     Jun 12, 2018  8:30 AM CDT   Office Visit with Sydnee Miller MD   [facility] ([facility])    [address] [place] [address] [phone]           Bring a current list of meds and any records pertaining to this visit. For Physicals, please bring immunization records and any forms needing to be filled out. Please arrive 10 minutes early to complete paperwork.            Jun 12, 2018  9:30 AM CDT   Nurse Only with OX DERM NURSE   [facility] ([facility])    [address] [place] [address] [phone]              Who to contact     If you have questions or need follow up information about today's clinic visit or your schedule please contact [facility] directly at [phone].  Normal or non-critical lab and imaging results will be communicated to you by [employer], letter or phone within 4 business days after the clinic has received the results. If you do not hear from us within 7 days, please contact the clinic through [email or web address] or phone. If you have a critical or abnormal lab result, we will notify you by phone as soon as possible.  Submit refill requests through [employer] or call your pharmacy and they will forward the refill request to us. Please allow 3 business days for your refill to be completed.          Additional Information About Your Visit        [employer] Information     [employer] gives you secure access to your electronic health record. If you see a primary care provider, you can also send messages to your care team and make appointments. If you have questions, please call your primary care clinic.  If you do not have a  primary care provider, please call [phone] and they will assist you.        Care EveryWhere ID     This is your Care EveryWhere ID. This could be used by other organizations to access your [facility] medical records  [id number]         Blood Pressure from Last 3 Encounters:   06/01/18 123/77   05/31/18 98/64   05/11/18 102/67    Weight from Last 3 Encounters:   05/11/18 69.7 kg (153 lb 9.6 oz)   04/17/18 70.2 kg (154 lb 11.2 oz)   01/12/18 71 kg (156 lb 9.6 oz)              Today, you had the following     No orders found for display       Primary Care Provider Office Phone # Fax #    Sydnee Miller [phone]       [address] [facility] [address]        Equal Access to Services     LAURA [facility] HospitalRICKEY : Hadii aad ku hadasho Soomaali, waaxda luqadaha, qaybta kaalmada adeegyada, zoey carbone hayaan babak tenorio . So [facility] [phone].    ATENCIÓN: Si habla español, tiene a medrano disposición servicios gratuitos de asistencia lingüística. Llame al [phone].    We comply with applicable federal civil rights laws and [place] laws. We do not discriminate on the basis of race, color, national origin, age, disability, sex, sexual orientation, or gender identity.            Thank you!     Thank you for choosing [facility]  for your care. Our goal is always to provide you with excellent care. Hearing back from our patients is one way we can continue to improve our services. Please take a few minutes to complete the written survey that you may receive in the mail after your visit with us. Thank you!             Your Updated Medication List - Protect others around you: Learn how to safely use, store and throw away your medicines at www.disposemymeds.org.          This list is accurate as of 6/6/18 12:00 PM.  Always use your most recent med list.                   Brand Name Dispense Instructions for use Diagnosis    AMBIEN 5 MG tablet   Generic drug:  zolpidem      Take  0.5-1 tablets (2.5-5 mg) by mouth nightly as needed for sleep        amLODIPine 10 MG tablet    NORVASC    90 tablet    TAKE ONE TABLET BY MOUTH ONCE DAILY IN THE EVENING    Essential hypertension       aspirin 81 MG tablet     100    ONE DAILY    Unspecified cardiovascular disease       atenolol 100 MG tablet    TENORMIN     Take 100 mg by mouth daily        atorvastatin 40 MG tablet    LIPITOR    90 tablet    TAKE ONE TABLET BY MOUTH ONCE DAILY AT BEDTIME    Hyperlipidemia LDL goal <100       bimatoprost 0.03 % Soln    latisse    3 mL    APPLY ONCE DAILY AT BEDTIME TO UPPER EYELID MARGIN WITH APPLICATOR (NOT LOWER EYELID) AS NEEDED.    Hypotrichosis of eyelid, unspecified laterality       Biotin 1000 MCG Chew      Take 1,000 mcg by mouth daily        CALCIUM 1200 PO      Take 2 tablets by mouth.    Routine general medical examination at a health care facility, Unspecified essential hypertension, Other and unspecified hyperlipidemia, Acute myocardial infarction, unspecified site, subsequent episode of care, Sleep disturbance, unspecified, Generalized anxiety disorder, AAA (abdominal aortic aneurysm) (H)       fluocinonide 0.05 % solution    LIDEX    60 mL    Apply to scalp and top of ears BID for 3-4 weeks. Do not use on face or body folds.    Dermatitis, seborrheic       hydrocortisone 0.2 % cream    WESTCORT    15 g    Apply sparingly to affected area three times daily for 14 days.    Rash and nonspecific skin eruption       ketoconazole 2 % shampoo    NIZORAL    120 mL    Wash hair daily. Lather and let sit for a few minutes on scalp and ears. Rinse.    Dermatitis, seborrheic       lisinopril 20 MG tablet    PRINIVIL/ZESTRIL    180 tablet    Take 1 tablet (20 mg) by mouth 2 times daily    Benign essential hypertension       LORazepam 0.5 MG tablet    ATIVAN    90 tablet    Take 1 tablet by mouth every 6 hours as needed for anxiety.    Sleep disturbance, unspecified, Acute myocardial infarction, unspecified site,  subsequent episode of care, Unspecified essential hypertension, Other and unspecified hyperlipidemia, Generalized anxiety disorder, Routine general medical examination at a health care facility, AAA (abdominal aortic aneurysm) (H)       omeprazole 20 MG CR capsule    priLOSEC    90 capsule    Take 1 capsule (20 mg) by mouth daily    Gastroesophageal reflux disease without esophagitis       sertraline 100 MG tablet    ZOLOFT     Take 1 tablet (100 mg) by mouth daily Rx'd by psych    Generalized anxiety disorder       spironolactone 25 MG tablet    ALDACTONE    30 tablet    TAKE ONE TABLET BY MOUTH ONCE DAILY IN THE MORNING    Essential hypertension       * triamcinolone 0.1 % lotion    KENALOG    60 mL    Apply sparingly once or twice per day as needed to affected area until the skin is better, then stop; REPEAT AS NEEDED    Psoriasis       * triamcinolone 0.1 % cream    KENALOG    80 g    Apply topically 2 times daily    Eczema, unspecified type       VITAMIN D PO      Take 1 tablet by mouth        * Notice:  This list has 2 medication(s) that are the same as other medications prescribed for you. Read the directions carefully, and ask your doctor or other care provider to review them with you.

## 2018-06-06 NOTE — NURSING NOTE
Pt returned to clinic for post surgery 1 week follow up bandage change. Pt has no complaints, denies pain. Bandage removed from medial canthus, area cleansed with normal saline. Site is healing and wound edges approximating well. Reapplied new steri strips and paper tape.    Advised to watch for signs/sx of infection; spreading redness, drainage, odor, fever. Call or report promptly to clinic. Pt given written instructions and informed to rtc as needed. Patient verbalized understanding.     Joann RN-BSN  [facility]  [phone]

## 2018-06-12 ENCOUNTER — OFFICE VISIT (OUTPATIENT)
Dept: INTERNAL MEDICINE | Facility: CLINIC | Age: 68
End: 2018-06-12
Payer: COMMERCIAL

## 2018-06-12 ENCOUNTER — ALLIED HEALTH/NURSE VISIT (OUTPATIENT)
Dept: DERMATOLOGY | Facility: CLINIC | Age: 68
End: 2018-06-12
Payer: COMMERCIAL

## 2018-06-12 VITALS
HEART RATE: 64 BPM | RESPIRATION RATE: 18 BRPM | BODY MASS INDEX: 28.61 KG/M2 | DIASTOLIC BLOOD PRESSURE: 70 MMHG | TEMPERATURE: 98.4 F | WEIGHT: 153.9 LBS | SYSTOLIC BLOOD PRESSURE: 124 MMHG | OXYGEN SATURATION: 98 %

## 2018-06-12 DIAGNOSIS — R15.9 INCONTINENCE OF FECES, UNSPECIFIED FECAL INCONTINENCE TYPE: ICD-10-CM

## 2018-06-12 DIAGNOSIS — K62.3 RECTAL PROLAPSE: ICD-10-CM

## 2018-06-12 DIAGNOSIS — I10 BENIGN ESSENTIAL HYPERTENSION: Primary | ICD-10-CM

## 2018-06-12 DIAGNOSIS — Z48.01 ENCOUNTER FOR CHANGE OR REMOVAL OF SURGICAL WOUND DRESSING: Primary | ICD-10-CM

## 2018-06-12 PROCEDURE — 99207 ZZC NO CHARGE NURSE ONLY: CPT

## 2018-06-12 PROCEDURE — 99213 OFFICE O/P EST LOW 20 MIN: CPT | Performed by: INTERNAL MEDICINE

## 2018-06-12 RX ORDER — SPIRONOLACTONE 25 MG/1
25 TABLET ORAL DAILY
Qty: 90 TABLET | Refills: 3 | Status: SHIPPED | OUTPATIENT
Start: 2018-06-12 | End: 2019-06-06

## 2018-06-12 NOTE — PROGRESS NOTES
SUBJECTIVE:                                                      HPI: Liz Haider is a pleasant 67 year old female who presents for blood pressure check:    Current medications include lisinopril 20 mg daily, atenolol 100 mg daily, spironolactone 25 mg daily, and Norvasc 10 mg daily.    Toprol is discontinued at last visit (no need to be on two beta blockers).    Blood pressure is well controlled on these medications.    Patient was also recently diagnosed with rectal prolapse. This is the likely cause of her fecal incontinence and diarrhea over the last couple of years. She will be meeting with a surgeon in the near future for further evaluation and to discuss treatment options.    The medication, allergy, and problem lists have been reviewed and updated as appropriate.       OBJECTIVE:                                                      /70  Pulse 64  Temp 98.4  F (36.9  C) (Oral)  Resp 18  Wt 153 lb 14.4 oz (69.8 kg)  SpO2 98%  BMI 28.61 kg/m2  Constitutional: well-appearing  Psych: normal judgment and insight; normal mood and affect; recent and remote memory intact      ASSESSMENT/PLAN:                                                      (I10) Benign essential hypertension  (primary encounter diagnosis)  Comment: well-controlled on current medications.  Plan: CPM; refill spironolactone provided.    (R15.9) Incontinence of feces, unspecified fecal incontinence type  (K62.3) Rectal prolapse  Plan: Patient will be meeting with a surgeon in near future for further evaluation and to discuss treatment options.    The instructions on the AVS were discussed and explained to the patient. Patient expressed understanding of instructions.    (Chart documentation was completed, in part, with vWise voice-recognition software. Even though reviewed, some grammatical, spelling, and word errors may remain.)    Sydnee Miller MD   [address]  [address]  T: [phone], F: [phone]

## 2018-06-12 NOTE — Clinical Note
Dr Blakely, I'm sending this just as FYI. The graft site was as hard as a rock. She's going to care for it properly now and so she should be fine. Most of the scab wanted to come off when I cleaned it but I didn't want to force it off. She'll goobe it up well each day until the hard scab falls off on its own.

## 2018-06-12 NOTE — MR AVS SNAPSHOT
After Visit Summary   6/12/2018    Liz Haider    MRN: [id number]           Patient Information     Date Of Birth          1950        Visit Information        Provider Department      6/12/2018 8:30 AM Sydnee Miller MD [facility]        Today's Diagnoses     Essential hypertension          Care Instructions    Refill of spironolactone sent in.           Follow-ups after your visit        Your next 10 appointments already scheduled     Jun 12, 2018  9:30 AM CDT   Nurse Only with OX DERM NURSE   [facility] ([facility]    [address] [address] [phone]              Who to contact     If you have questions or need follow up information about today's clinic visit or your schedule please contact [facility] directly at [phone].  Normal or non-critical lab and imaging results will be communicated to you by MyChart, letter or phone within 4 business days after the clinic has received the results. If you do not hear from us within 7 days, please contact the clinic through MyChart or phone. If you have a critical or abnormal lab result, we will notify you by phone as soon as possible.  Submit refill requests through [employer] or call your pharmacy and they will forward the refill request to us. Please allow 3 business days for your refill to be completed.          Additional Information About Your Visit        MyChart Information     [employer] gives you secure access to your electronic health record. If you see a primary care provider, you can also send messages to your care team and make appointments. If you have questions, please call your primary care clinic.  If you do not have a primary care provider, please call [phone] and they will assist you.        Care EveryWhere ID     This is your Care EveryWhere ID. This could be used by other organizations to  access your [facility] medical records  [id number]        Your Vitals Were     Pulse Temperature Respirations Pulse Oximetry BMI (Body Mass Index)       64 98.4  F (36.9  C) (Oral) 18 98% 28.61 kg/m2        Blood Pressure from Last 3 Encounters:   06/12/18 124/70   06/01/18 123/77   05/31/18 98/64    Weight from Last 3 Encounters:   06/12/18 153 lb 14.4 oz (69.8 kg)   05/11/18 153 lb 9.6 oz (69.7 kg)   04/17/18 154 lb 11.2 oz (70.2 kg)              Today, you had the following     No orders found for display         Today's Medication Changes          These changes are accurate as of 6/12/18  8:46 AM.  If you have any questions, ask your nurse or doctor.               These medicines have changed or have updated prescriptions.        Dose/Directions    spironolactone 25 MG tablet   Commonly known as:  ALDACTONE   This may have changed:  See the new instructions.   Used for:  Essential hypertension   Changed by:  Sydnee Miller MD        Dose:  25 mg   Take 1 tablet (25 mg) by mouth daily   Quantity:  90 tablet   Refills:  3            Where to get your medicines      These medications were sent to [facility] [address] [place], [place] [address]     Phone:  [phone]     spironolactone 25 MG tablet                Primary Care Provider Office Phone # Fax #    Sydnee Miller [phone]       [address] [facility] [address]        Equal Access to Services     LAURA BLANK : Hadlashaun gonzaleso Sodaysi, waaxda luqadaha, qaybta kaalmada adeegyada, zoey carrera adejessica salazar. So [facility] [phone].    ATENCIÓN: Si habla español, tiene a medrano disposición servicios gratuitos de asistencia lingüística. Llame al [phone].    We comply with applicable federal civil rights laws and [place] laws. We do not discriminate on the basis of race, color, national origin, age, disability, sex, sexual orientation, or  gender identity.            Thank you!     Thank you for choosing [facility]  for your care. Our goal is always to provide you with excellent care. Hearing back from our patients is one way we can continue to improve our services. Please take a few minutes to complete the written survey that you may receive in the mail after your visit with us. Thank you!             Your Updated Medication List - Protect others around you: Learn how to safely use, store and throw away your medicines at www.disposemymeds.org.          This list is accurate as of 6/12/18  8:46 AM.  Always use your most recent med list.                   Brand Name Dispense Instructions for use Diagnosis    AMBIEN 5 MG tablet   Generic drug:  zolpidem      Take 0.5-1 tablets (2.5-5 mg) by mouth nightly as needed for sleep        amLODIPine 10 MG tablet    NORVASC    90 tablet    TAKE ONE TABLET BY MOUTH ONCE DAILY IN THE EVENING    Essential hypertension       aspirin 81 MG tablet     100    ONE DAILY    Unspecified cardiovascular disease       atenolol 100 MG tablet    TENORMIN     Take 100 mg by mouth daily        atorvastatin 40 MG tablet    LIPITOR    90 tablet    TAKE ONE TABLET BY MOUTH ONCE DAILY AT BEDTIME    Hyperlipidemia LDL goal <100       bimatoprost 0.03 % Soln    latisse    3 mL    APPLY ONCE DAILY AT BEDTIME TO UPPER EYELID MARGIN WITH APPLICATOR (NOT LOWER EYELID) AS NEEDED.    Hypotrichosis of eyelid, unspecified laterality       Biotin 1000 MCG Chew      Take 1,000 mcg by mouth daily        CALCIUM 1200 PO      Take 2 tablets by mouth.    Routine general medical examination at a health care facility, Unspecified essential hypertension, Other and unspecified hyperlipidemia, Acute myocardial infarction, unspecified site, subsequent episode of care, Sleep disturbance, unspecified, Generalized anxiety disorder, AAA (abdominal aortic aneurysm) (H)       fluocinonide 0.05 % solution    LIDEX    60 mL    Apply to  scalp and top of ears BID for 3-4 weeks. Do not use on face or body folds.    Dermatitis, seborrheic       hydrocortisone 0.2 % cream    WESTCORT    15 g    Apply sparingly to affected area three times daily for 14 days.    Rash and nonspecific skin eruption       ketoconazole 2 % shampoo    NIZORAL    120 mL    Wash hair daily. Lather and let sit for a few minutes on scalp and ears. Rinse.    Dermatitis, seborrheic       lisinopril 20 MG tablet    PRINIVIL/ZESTRIL    180 tablet    Take 1 tablet (20 mg) by mouth 2 times daily    Benign essential hypertension       LORazepam 0.5 MG tablet    ATIVAN    90 tablet    Take 1 tablet by mouth every 6 hours as needed for anxiety.    Sleep disturbance, unspecified, Acute myocardial infarction, unspecified site, subsequent episode of care, Unspecified essential hypertension, Other and unspecified hyperlipidemia, Generalized anxiety disorder, Routine general medical examination at a health care facility, AAA (abdominal aortic aneurysm) (H)       omeprazole 20 MG CR capsule    priLOSEC    90 capsule    Take 1 capsule (20 mg) by mouth daily    Gastroesophageal reflux disease without esophagitis       sertraline 100 MG tablet    ZOLOFT     Take 1 tablet (100 mg) by mouth daily Rx'd by psych    Generalized anxiety disorder       spironolactone 25 MG tablet    ALDACTONE    90 tablet    Take 1 tablet (25 mg) by mouth daily    Essential hypertension       * triamcinolone 0.1 % lotion    KENALOG    60 mL    Apply sparingly once or twice per day as needed to affected area until the skin is better, then stop; REPEAT AS NEEDED    Psoriasis       * triamcinolone 0.1 % cream    KENALOG    80 g    Apply topically 2 times daily    Eczema, unspecified type       VITAMIN D PO      Take 1 tablet by mouth        * Notice:  This list has 2 medication(s) that are the same as other medications prescribed for you. Read the directions carefully, and ask your doctor or other care provider to review  them with you.

## 2018-06-12 NOTE — MR AVS SNAPSHOT
After Visit Summary   6/12/2018    Liz Haider    MRN: [id number]           Patient Information     Date Of Birth          1950        Visit Information        Provider Department      6/12/2018 9:30 AM [facility] NURSE [facility]        Care Instructions    ONE WEEK DRESSING CHANGE  for  SKIN GRAFTS  The following information has been compiled to offer you assistance with the dressing change or wound evaluation. Please feel free to call our office to speak with one of the nurses if you have any questions or concerns about the progress of the wound healing process especially if there are any signs of graft necrosis or infection. We will be happy to answer any questions you might have.                                                               AFTER 24 HOURS YOU SHOULD REMOVE THE BANDAGE AND BEGIN DAILY DRESSING CHANGES AS FOLLOWS:     1) Remove Dressing.     2) Clean and dry the area with tap water using a Q-tip or sterile gauze pad.     3) Apply Vaseline, Polysporin ointment, Aquaphor or Bacitracin ointment over entire wound.  Do NOT use Neosporin ointment.     4) Cover the wound with a band-aid, or a sterile non-stick gauze pad and micropore paper tape      REPEAT THESE INSTRUCTIONS AT LEAST ONCE A DAY UNTIL THE WOUND HAS COMPLETELY HEALED. DO NOT LET THE WOUND SCAB OVER.    It is an old wives tale that a wound heals better when it is exposed to air and allowed to dry out. The wound will heal faster with a better cosmetic result if it is kept moist with ointment and covered with a bandage.       Massaging the wound site hastens the healing process by softening the scar tissue and fading the scar. Begin massaging the area one month after surgery as often as possible. Continue to massage the area for 2-3 months or until you feel the scar tissue has softened. Moisturizers can be used during the massaging but are not necessary. Ultimately it takes six months for the graft  to heal and blend into the surrounding skin.          Follow-ups after your visit        Who to contact     If you have questions or need follow up information about today's clinic visit or your schedule please contact [facility] directly at [phone].  Normal or non-critical lab and imaging results will be communicated to you by [employer], letter or phone within 4 business days after the clinic has received the results. If you do not hear from us within 7 days, please contact the clinic through [employer] or phone. If you have a critical or abnormal lab result, we will notify you by phone as soon as possible.  Submit refill requests through [employer] or call your pharmacy and they will forward the refill request to us. Please allow 3 business days for your refill to be completed.          Additional Information About Your Visit        MyChart Information     [employer] gives you secure access to your electronic health record. If you see a primary care provider, you can also send messages to your care team and make appointments. If you have questions, please call your primary care clinic.  If you do not have a primary care provider, please call [phone] and they will assist you.        Care EveryWhere ID     This is your Care EveryWhere ID. This could be used by other organizations to access your [facility] medical records  [id number]         Blood Pressure from Last 3 Encounters:   06/12/18 124/70   06/01/18 123/77   05/31/18 98/64    Weight from Last 3 Encounters:   06/12/18 69.8 kg (153 lb 14.4 oz)   05/11/18 69.7 kg (153 lb 9.6 oz)   04/17/18 70.2 kg (154 lb 11.2 oz)              Today, you had the following     No orders found for display         Today's Medication Changes          These changes are accurate as of 6/12/18  9:37 AM.  If you have any questions, ask your nurse or doctor.               These medicines have changed or have updated prescriptions.        Dose/Directions     spironolactone 25 MG tablet   Commonly known as:  ALDACTONE   This may have changed:  See the new instructions.   Changed by:  Sydnee Miller MD        Dose:  25 mg   Take 1 tablet (25 mg) by mouth daily   Quantity:  90 tablet   Refills:  3            Where to get your medicines      These medications were sent to [facility] [address] [place] [address] [address] [facility]  [address] [facility] [address]     Phone:  [phone]     spironolactone 25 MG tablet                Primary Care Provider Office Phone # Fax #    Sydnee Miller [phone]       [address] [facility] [address]        Equal Access to Services     [facility] Center[facility] : Hadii yogi porter hadasho Sodaysi, waaxda luqadaha, qaybta kaalmada adeegyada, zoey tenorio . So [facility] [phone].    ATENCIÓN: Si habla español, tiene a medrano disposición servicios gratuitos de asistencia lingüística. Llame al [phone].    We comply with applicable federal civil rights laws and [place] laws. We do not discriminate on the basis of race, color, national origin, age, disability, sex, sexual orientation, or gender identity.            Thank you!     Thank you for choosing [facility]  for your care. Our goal is always to provide you with excellent care. Hearing back from our patients is one way we can continue to improve our services. Please take a few minutes to complete the written survey that you may receive in the mail after your visit with us. Thank you!             Your Updated Medication List - Protect others around you: Learn how to safely use, store and throw away your medicines at www.disposemymeds.org.          This list is accurate as of 6/12/18  9:37 AM.  Always use your most recent med list.                   Brand Name Dispense Instructions for use Diagnosis    AMBIEN 5 MG tablet   Generic drug:  zolpidem      Take 0.5-1 tablets (2.5-5 mg) by mouth nightly  as needed for sleep        amLODIPine 10 MG tablet    NORVASC    90 tablet    TAKE ONE TABLET BY MOUTH ONCE DAILY IN THE EVENING    Essential hypertension       aspirin 81 MG tablet     100    ONE DAILY    Unspecified cardiovascular disease       atenolol 100 MG tablet    TENORMIN     Take 100 mg by mouth daily        atorvastatin 40 MG tablet    LIPITOR    90 tablet    TAKE ONE TABLET BY MOUTH ONCE DAILY AT BEDTIME    Hyperlipidemia LDL goal <100       bimatoprost 0.03 % Soln    latisse    3 mL    APPLY ONCE DAILY AT BEDTIME TO UPPER EYELID MARGIN WITH APPLICATOR (NOT LOWER EYELID) AS NEEDED.    Hypotrichosis of eyelid, unspecified laterality       Biotin 1000 MCG Chew      Take 1,000 mcg by mouth daily        CALCIUM 1200 PO      Take 2 tablets by mouth.    Routine general medical examination at a health care facility, Unspecified essential hypertension, Other and unspecified hyperlipidemia, Acute myocardial infarction, unspecified site, subsequent episode of care, Sleep disturbance, unspecified, Generalized anxiety disorder, AAA (abdominal aortic aneurysm) (H)       fluocinonide 0.05 % solution    LIDEX    60 mL    Apply to scalp and top of ears BID for 3-4 weeks. Do not use on face or body folds.    Dermatitis, seborrheic       hydrocortisone 0.2 % cream    WESTCORT    15 g    Apply sparingly to affected area three times daily for 14 days.    Rash and nonspecific skin eruption       ketoconazole 2 % shampoo    NIZORAL    120 mL    Wash hair daily. Lather and let sit for a few minutes on scalp and ears. Rinse.    Dermatitis, seborrheic       lisinopril 20 MG tablet    PRINIVIL/ZESTRIL    180 tablet    Take 1 tablet (20 mg) by mouth 2 times daily    Benign essential hypertension       LORazepam 0.5 MG tablet    ATIVAN    90 tablet    Take 1 tablet by mouth every 6 hours as needed for anxiety.    Sleep disturbance, unspecified, Acute myocardial infarction, unspecified site, subsequent episode of care, Unspecified  essential hypertension, Other and unspecified hyperlipidemia, Generalized anxiety disorder, Routine general medical examination at a health care facility, AAA (abdominal aortic aneurysm) (H)       omeprazole 20 MG CR capsule    priLOSEC    90 capsule    Take 1 capsule (20 mg) by mouth daily    Gastroesophageal reflux disease without esophagitis       sertraline 100 MG tablet    ZOLOFT     Take 1 tablet (100 mg) by mouth daily Rx'd by psych    Generalized anxiety disorder       spironolactone 25 MG tablet    ALDACTONE    90 tablet    Take 1 tablet (25 mg) by mouth daily        * triamcinolone 0.1 % lotion    KENALOG    60 mL    Apply sparingly once or twice per day as needed to affected area until the skin is better, then stop; REPEAT AS NEEDED    Psoriasis       * triamcinolone 0.1 % cream    KENALOG    80 g    Apply topically 2 times daily    Eczema, unspecified type       VITAMIN D PO      Take 1 tablet by mouth        * Notice:  This list has 2 medication(s) that are the same as other medications prescribed for you. Read the directions carefully, and ask your doctor or other care provider to review them with you.

## 2018-06-12 NOTE — NURSING NOTE
"Pt came in for bandage change. No complaints except states that the large scab at the inner canthus of the right eye is \"annoying\" since it's so close to her eye and in her field of vision. Has a dark colored, hard scab over the grafted area. The sutured area below the graft is healed together. Pt has been keeping entire wound dry since surgery. She has not been using ointment at the graft site. Says she thought she was supposed to leave the whole thing dry.   Area cleansed and applied thick amount of Aquaphor at the graft site and advised pt to clean and reapply ointment until completely healed. Went over patient instructions handout in detail. Pt verbalized understanding.  GELACIO Ramos LPN    "

## 2018-06-13 ASSESSMENT — PATIENT HEALTH QUESTIONNAIRE - PHQ9: SUM OF ALL RESPONSES TO PHQ QUESTIONS 1-9: 5

## 2018-06-14 ENCOUNTER — TRANSFERRED RECORDS (OUTPATIENT)
Dept: HEALTH INFORMATION MANAGEMENT | Facility: CLINIC | Age: 68
End: 2018-06-14

## 2018-06-19 DIAGNOSIS — I10 BENIGN ESSENTIAL HYPERTENSION: ICD-10-CM

## 2018-06-19 NOTE — TELEPHONE ENCOUNTER
"Requested Prescriptions   Pending Prescriptions Disp Refills     atenolol (TENORMIN) 100 MG tablet [Pharmacy Med Name: ATENOLOL 100MG      TAB] 90 tablet 2     Sig: TAKE ONE TABLET BY MOUTH ONCE DAILY    Beta-Blockers Protocol Passed    6/19/2018 11:36 AM       Passed - Blood pressure under 140/90 in past 12 months    BP Readings from Last 3 Encounters:   06/12/18 124/70   06/01/18 123/77   05/31/18 98/64                Passed - Patient is age 6 or older       Passed - Recent (12 mo) or future (30 days) visit within the authorizing provider's specialty    Patient had office visit in the last 12 months or has a visit in the next 30 days with authorizing provider or within the authorizing provider's specialty.  See \"Patient Info\" tab in inbasket, or \"Choose Columns\" in Meds & Orders section of the refill encounter.            Last Written Prescription Date:  3/22/18  Last Fill Quantity: hist,  # refills: hist   Last office visit: 6/12/2018 with prescribing provider:  6/12/18   Future Office Visit:      "

## 2018-06-20 RX ORDER — ATENOLOL 100 MG/1
TABLET ORAL
Qty: 90 TABLET | Refills: 2 | Status: SHIPPED | OUTPATIENT
Start: 2018-06-20 | End: 2018-08-14

## 2018-07-11 ENCOUNTER — HOSPITAL ENCOUNTER (OUTPATIENT)
Facility: CLINIC | Age: 68
End: 2018-07-11
Attending: UROLOGY | Admitting: UROLOGY
Payer: MEDICARE

## 2018-07-11 ENCOUNTER — TELEPHONE (OUTPATIENT)
Dept: DERMATOLOGY | Facility: CLINIC | Age: 68
End: 2018-07-11

## 2018-07-11 RX ORDER — CLINDAMYCIN PHOSPHATE 900 MG/50ML
900 INJECTION, SOLUTION INTRAVENOUS
Status: CANCELLED | OUTPATIENT
Start: 2018-08-22

## 2018-07-11 RX ORDER — PHENAZOPYRIDINE HYDROCHLORIDE 200 MG/1
200 TABLET, FILM COATED ORAL ONCE
Status: CANCELLED | OUTPATIENT
Start: 2018-08-22

## 2018-07-11 NOTE — TELEPHONE ENCOUNTER
Called and spoke to patient. Patient asked how long she needs to continue covering sutured area post Mohs procedure. Advised patient to keep area moist and covered until completely healed. Patient voiced understanding.    REBECA David-BSN  [facility]  [phone]

## 2018-07-11 NOTE — TELEPHONE ENCOUNTER
Reason for Call:  Other call back    Detailed comments: pt wants a call back regarding the healing and scaring of having mohs done,  Pt had mohs done on 5/31/18 with makenzie and has questions. Please call pt back.     Phone Number Patient can be reached at: Home number on file [phone] (home)    Best Time: anytime    Can we leave a detailed message on this number? YES    Call taken on 7/11/2018 at 1:26 PM by REED CASTILLO

## 2018-08-08 DIAGNOSIS — L21.9 DERMATITIS, SEBORRHEIC: ICD-10-CM

## 2018-08-08 RX ORDER — FLUOCINONIDE TOPICAL SOLUTION USP, 0.05% 0.5 MG/ML
SOLUTION TOPICAL
Qty: 60 ML | Refills: 0 | Status: SHIPPED | OUTPATIENT
Start: 2018-08-08 | End: 2019-09-03

## 2018-08-08 NOTE — TELEPHONE ENCOUNTER
".  Requested Prescriptions   Pending Prescriptions Disp Refills     fluocinonide (LIDEX) 0.05 % solution  Last Written Prescription Date:  4/2/18  Last Fill Quantity: 60,  # refills:    Last office visit: 6/12/2018 with prescribing provider:     Future Office Visit:   Next 5 appointments (look out 90 days)     Aug 14, 2018  8:15 AM CDT   Pre-Op physical with Sydnee Miller MD   [facility] ([facility]    [address] [address] [phone]                60 mL 0     Sig: Apply to scalp and top of ears BID for 3-4 weeks. Do not use on face or body folds.    Topical Steroids and Nonsteroidals Protocol Failed    8/8/2018 11:10 AM       Failed - High potency steroid not ordered       Passed - Patient is age 6 or older       Passed - Authorizing prescriber's most recent note related to this medication read.    If refill request is for ophthalmic use, please forward request to provider for approval.         Passed - Recent (12 mo) or future (30 days) visit within the authorizing provider's specialty    Patient had office visit in the last 12 months or has a visit in the next 30 days with authorizing provider or within the authorizing provider's specialty.  See \"Patient Info\" tab in inbasket, or \"Choose Columns\" in Meds & Orders section of the refill encounter.              "

## 2018-08-14 ENCOUNTER — OFFICE VISIT (OUTPATIENT)
Dept: INTERNAL MEDICINE | Facility: CLINIC | Age: 68
End: 2018-08-14
Payer: COMMERCIAL

## 2018-08-14 VITALS
OXYGEN SATURATION: 100 % | BODY MASS INDEX: 28.12 KG/M2 | TEMPERATURE: 98.9 F | HEART RATE: 64 BPM | HEIGHT: 62 IN | WEIGHT: 152.8 LBS | DIASTOLIC BLOOD PRESSURE: 70 MMHG | SYSTOLIC BLOOD PRESSURE: 116 MMHG

## 2018-08-14 DIAGNOSIS — N81.10 VAGINAL PROLAPSE: ICD-10-CM

## 2018-08-14 DIAGNOSIS — N81.6 RECTOCELE: ICD-10-CM

## 2018-08-14 DIAGNOSIS — I10 BENIGN ESSENTIAL HYPERTENSION: ICD-10-CM

## 2018-08-14 DIAGNOSIS — K62.3 RECTAL PROLAPSE: ICD-10-CM

## 2018-08-14 DIAGNOSIS — R15.9 INCONTINENCE OF FECES, UNSPECIFIED FECAL INCONTINENCE TYPE: ICD-10-CM

## 2018-08-14 DIAGNOSIS — K21.9 GASTROESOPHAGEAL REFLUX DISEASE, ESOPHAGITIS PRESENCE NOT SPECIFIED: ICD-10-CM

## 2018-08-14 DIAGNOSIS — F33.42 RECURRENT MAJOR DEPRESSIVE DISORDER, IN FULL REMISSION (H): ICD-10-CM

## 2018-08-14 DIAGNOSIS — K21.9 GASTROESOPHAGEAL REFLUX DISEASE WITHOUT ESOPHAGITIS: ICD-10-CM

## 2018-08-14 DIAGNOSIS — F41.1 GAD (GENERALIZED ANXIETY DISORDER): ICD-10-CM

## 2018-08-14 DIAGNOSIS — Z01.818 PREOPERATIVE EXAMINATION: Primary | ICD-10-CM

## 2018-08-14 DIAGNOSIS — I25.10 CORONARY ARTERY DISEASE INVOLVING NATIVE CORONARY ARTERY OF NATIVE HEART WITHOUT ANGINA PECTORIS: ICD-10-CM

## 2018-08-14 LAB
ALBUMIN SERPL-MCNC: 4.1 G/DL (ref 3.4–5)
ALBUMIN UR-MCNC: NEGATIVE MG/DL
ALP SERPL-CCNC: 59 U/L (ref 40–150)
ALT SERPL W P-5'-P-CCNC: 23 U/L (ref 0–50)
ANION GAP SERPL CALCULATED.3IONS-SCNC: 7 MMOL/L (ref 3–14)
APPEARANCE UR: CLEAR
AST SERPL W P-5'-P-CCNC: 17 U/L (ref 0–45)
BILIRUB SERPL-MCNC: 0.7 MG/DL (ref 0.2–1.3)
BILIRUB UR QL STRIP: NEGATIVE
BUN SERPL-MCNC: 16 MG/DL (ref 7–30)
CALCIUM SERPL-MCNC: 9.3 MG/DL (ref 8.5–10.1)
CHLORIDE SERPL-SCNC: 97 MMOL/L (ref 94–109)
CO2 SERPL-SCNC: 30 MMOL/L (ref 20–32)
COLOR UR AUTO: YELLOW
CREAT SERPL-MCNC: 0.8 MG/DL (ref 0.52–1.04)
ERYTHROCYTE [DISTWIDTH] IN BLOOD BY AUTOMATED COUNT: 13.2 % (ref 10–15)
GFR SERPL CREATININE-BSD FRML MDRD: 71 ML/MIN/1.7M2
GLUCOSE SERPL-MCNC: 96 MG/DL (ref 70–99)
GLUCOSE UR STRIP-MCNC: NEGATIVE MG/DL
HCT VFR BLD AUTO: 41.1 % (ref 35–47)
HGB BLD-MCNC: 13.4 G/DL (ref 11.7–15.7)
HGB UR QL STRIP: ABNORMAL
KETONES UR STRIP-MCNC: NEGATIVE MG/DL
LEUKOCYTE ESTERASE UR QL STRIP: NEGATIVE
MCH RBC QN AUTO: 30.8 PG (ref 26.5–33)
MCHC RBC AUTO-ENTMCNC: 32.6 G/DL (ref 31.5–36.5)
MCV RBC AUTO: 95 FL (ref 78–100)
MRSA DNA SPEC QL NAA+PROBE: NEGATIVE
NITRATE UR QL: NEGATIVE
PH UR STRIP: 7 PH (ref 5–7)
PLATELET # BLD AUTO: 214 10E9/L (ref 150–450)
POTASSIUM SERPL-SCNC: 4.6 MMOL/L (ref 3.4–5.3)
PROT SERPL-MCNC: 7.2 G/DL (ref 6.8–8.8)
RBC # BLD AUTO: 4.35 10E12/L (ref 3.8–5.2)
RBC #/AREA URNS AUTO: ABNORMAL /HPF
SODIUM SERPL-SCNC: 134 MMOL/L (ref 133–144)
SOURCE: ABNORMAL
SP GR UR STRIP: <=1.005 (ref 1–1.03)
SPECIMEN SOURCE: NORMAL
UROBILINOGEN UR STRIP-ACNC: 0.2 EU/DL (ref 0.2–1)
WBC # BLD AUTO: 6.5 10E9/L (ref 4–11)
WBC #/AREA URNS AUTO: ABNORMAL /HPF

## 2018-08-14 PROCEDURE — 93000 ELECTROCARDIOGRAM COMPLETE: CPT | Performed by: INTERNAL MEDICINE

## 2018-08-14 PROCEDURE — 81001 URINALYSIS AUTO W/SCOPE: CPT | Performed by: INTERNAL MEDICINE

## 2018-08-14 PROCEDURE — 80053 COMPREHEN METABOLIC PANEL: CPT | Performed by: INTERNAL MEDICINE

## 2018-08-14 PROCEDURE — 36415 COLL VENOUS BLD VENIPUNCTURE: CPT | Performed by: INTERNAL MEDICINE

## 2018-08-14 PROCEDURE — 87641 MR-STAPH DNA AMP PROBE: CPT | Performed by: INTERNAL MEDICINE

## 2018-08-14 PROCEDURE — 85027 COMPLETE CBC AUTOMATED: CPT | Performed by: INTERNAL MEDICINE

## 2018-08-14 PROCEDURE — 99215 OFFICE O/P EST HI 40 MIN: CPT | Performed by: INTERNAL MEDICINE

## 2018-08-14 NOTE — PATIENT INSTRUCTIONS
Urine today.     Blood tests today.    ---    STOP aspirin 7 days prior to surgery.    Continue all other medications as prescribed up to the day of surgery.    On the morning of surgery, take atenolol, omeprazole, ativan, and sertraline ONLY with a sip of water.

## 2018-08-14 NOTE — PROGRESS NOTES
SUBJECTIVE:                                                      HPI: Liz Haider is a pleasant 67 year old female who presents for preoperative evaluation.    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 No  Personal or family history of blood clots? No  History of snoring/Sleep Apnea? No  History of blood transfusions? No    Clinical Predictors of Increased Risk: intermediate: history of CAD    Past Surgical History:   Procedure Laterality Date     COLONOSCOPY  4/20/2012    Procedure:COLONOSCOPY; COLONOSCOPY; Surgeon:EDSON SHELLEY; Location: GI     HYSTERECTOMY TOTAL ABDOMINAL  1988    for heavy periods     TONSILLECTOMY & ADENOIDECTOMY  1952     Artificial assistive devices, such as pacemakers, artificial cardiac valves, or artificial joints? No    Allergies   Allergen Reactions     Penicillins Hives     Sulfa Drugs Hives     Personal or family history of allergy to anesthesia? No  Allergy to local or topical analgesics? No  Allergy to latex? No  Allergy to adhesives/skin preparations (chlorhexadine)? No    Current Outpatient Prescriptions   Medication Sig     amLODIPine (NORVASC) 10 MG tablet TAKE ONE TABLET BY MOUTH ONCE DAILY IN THE EVENING     ASPIRIN  81 MG OR TABS ONE DAILY     atenolol (TENORMIN) 100 MG tablet Take 100 mg by mouth daily     atorvastatin (LIPITOR) 40 MG tablet TAKE ONE TABLET BY MOUTH ONCE AT BEDTIME     Biotin 1000 MCG CHEW Take 1,000 mcg by mouth daily     Calcium Carbonate-Vit D-Min (CALCIUM 1200 PO) Take 2 tablets by mouth.     Cholecalciferol (VITAMIN D PO) Take 1 tablet by mouth     lisinopril (PRINIVIL/ZESTRIL) 20 MG tablet Take 1 tablet (20 mg) by mouth 2 times daily     LORazepam (ATIVAN) 0.5 MG tablet Take 1 tablet by mouth every 6 hours as needed for anxiety.     omeprazole (PRILOSEC) 20 MG CR capsule Take 1 capsule (20 mg) by mouth daily     sertraline (ZOLOFT) 100 MG tablet Take 1 tablet (100 mg) by mouth daily Rx'd by psych     spironolactone (ALDACTONE) 25 MG tablet Take 1 tablet (25 mg) by mouth daily     zolpidem (AMBIEN) 5 MG tablet Take 0.5-1 tablets (2.5-5 mg) by mouth nightly as needed for sleep     Over the counter medications? Tylenol as needed  Vitamin Supplements? Other than above, no   Herbal Supplements? Other than above, no    Family History   Problem Relation Age of Onset     Myocardial Infarction Father      later in life     Type 2 Diabetes Sister      Bladder Cancer Sister      smoking history     Dementia Sister      FTD     Aneurysm Sister      AAA     Dementia Mother      Cerebrovascular Disease No family hx of      Coronary Artery Disease Early Onset No family hx of      Breast Cancer No family hx of      Ovarian Cancer No family hx of      Colon Cancer No family hx of      Occupational History     Retired - [employer]      Social History Main Topics     Smoking status: Former Smoker     Packs/day: 2.00     Years: 35.00     Types: Cigarettes     Quit date: 7/1/2006     Smokeless tobacco: Never Used     Alcohol use No      Comment: history of alcohol abuse; sober since 1987     Drug use: No     Sexual activity: No     Social History Narrative    .    No kids.    Walks daily, but no  "formal exercise.       Functional capacity: Can do heavy work around the house such as scrubbing floors or lifting or moving heavy furniture (4-10 METs)    ROS:  Constitutional: denies unintentional weight loss or gain; denies fevers, chills, or sweats     Cardiovascular: denies chest pain, palpitations, or edema  Respiratory: denies cough, wheezing, shortness of breath, or dyspnea on exertion  Gastrointestinal: denies nausea, vomiting, or abdominal pain  Genitourinary: denies urinary frequency, urgency, dysuria, or hematuria  Integumentary: denies rash or pruritus  Musculoskeletal: denies back pain, muscle pain, joint pain, or joint swelling  Neurologic: denies focal weakness, numbness, or tingling  Hematologic/Immunologic: denies history of anemia or blood transfusions  Endocrine: denies heat or cold intolerance; denies polyuria, polydipsia  Psychiatric: see PMH above    OBJECTIVE:                                                      /70  Pulse 64  Temp 98.9  F (37.2  C) (Oral)  Ht 5' 1.5\" (1.562 m)  Wt 152 lb 12.8 oz (69.3 kg)  SpO2 100%  BMI 28.4 kg/m2  Constitutional: well-appearing  Eyes: normal conjunctivae and lids; pupils equal, round, and reactive to light  Ears, Nose, Mouth, and Throat: normal ears and nose; tympanic membranes visualized and normal; normal lips, teeth, and gums; no oropharyngeal lesions or ulcers  Neck: supple and symmetric; no lymphadenopathy; no thyromegaly or masses  Respiratory: normal respiratory effort; clear to auscultation bilaterally  Cardiovascular: regular rate and rhythm; pedal pulses palpable; no edema  Gastrointestinal: soft, non-tender, non-distended, and bowel sounds present; no organomegaly or masses  Musculoskeletal: normal gait and station  Psych: normal judgment and insight; normal mood and affect; recent and remote memory intact; oriented to time, place, and person    ASSESSMENT/PLAN:                                                      Liz Haider is a " pleasant 67 year old female who presents for a preoperative evaluation. She is at intermediate clinical risk for an intermediate risk procedure.    (Z01.818) Preoperative examination  (primary encounter diagnosis)  (K62.3) Rectal prolapse  (N81.6) Rectocele  (N81.10) Vaginal prolapse  (R15.9) Incontinence of feces, unspecified fecal incontinence type  (F41.1) VISHAL (generalized anxiety disorder)  (I10) Benign essential hypertension  (I25.10) Coronary artery disease involving native coronary artery of native heart without angina pectoris  (F33.42) Recurrent major depressive disorder, in full remission (H)  (K21.9) Gastroesophageal reflux disease, esophagitis presence not specified  (K21.9) Gastroesophageal reflux disease without esophagitis  Plan:    - EKG, MRSA nasal swab, CBC, CMP today.   - HOLD aspirin 7 days prior to surgery.   - continue all other medications as prescribed up to the day of surgery.   - on morning of surgery take atenolol, omeprazole, Ativan, and sertraline ONLY with a sip of water    - HOLD all other morning medications.    RECOMMEND PROCEEDING WITH SURGERY: YES.    Thank you for referring Liz Haider to me for consultation and allowing me to participate in her care.    The instructions on the AVS were discussed and explained to the patient. Patient expressed understanding of instructions.    (Chart documentation was completed, in part, with [employer] voice-recognition software. Even though reviewed, some grammatical, spelling, and word errors may remain.)    Sydnee Miller MD   [address] [address]  T: [phone], F: [phone]      ADDENDUM - 10/8/18                                                      There have been no interval changes in the patient's history or health since her preop exam performed 8/14/18.    Liz Haider may proceed with surgery on 10/31/18 without additional work-up.    Sydnee Miller MD   [place]  [address] [address]  T: [phone], F: [phone]

## 2018-08-14 NOTE — MR AVS SNAPSHOT
After Visit Summary   8/14/2018    Liz Haider    MRN: [id number]           Patient Information     Date Of Birth          1950        Visit Information        Provider Department      8/14/2018 8:15 AM Sydnee Miller MD [facility]        Today's Diagnoses     Preoperative examination    -  1    Rectal prolapse        Rectocele        Vaginal prolapse        Incontinence of feces, unspecified fecal incontinence type        VISHAL (generalized anxiety disorder)        Benign essential hypertension        Coronary artery disease involving native coronary artery of native heart without angina pectoris        Recurrent major depressive disorder, in full remission (H)        Gastroesophageal reflux disease, esophagitis presence not specified          Care Instructions    Urine today.     Blood tests today.    ---    STOP aspirin 7 days prior to surgery.    Continue all other medications as prescribed up to the day of surgery.    On the morning of surgery, take atenolol, omeprazole, ativan, and sertraline ONLY with a sip of water.            Follow-ups after your visit        Your next 10 appointments already scheduled     Aug 22, 2018   Procedure with Turenr Jonas MD   [facility] (--)    [address] [address] [phone]              Who to contact     If you have questions or need follow up information about today's clinic visit or your schedule please contact [facility] directly at [phone].  Normal or non-critical lab and imaging results will be communicated to you by MyChart, letter or phone within 4 business days after the clinic has received the results. If you do not hear from us within 7 days, please contact the clinic through MyChart or phone. If you have a critical or abnormal lab result, we will notify you by phone as soon as possible.  Submit refill requests through  "MyChart or call your pharmacy and they will forward the refill request to us. Please allow 3 business days for your refill to be completed.          Additional Information About Your Visit        MyChart Information     [email or web address] gives you secure access to your electronic health record. If you see a primary care provider, you can also send messages to your care team and make appointments. If you have questions, please call your primary care clinic.  If you do not have a primary care provider, please call [phone] and they will assist you.        Care EveryWhere ID     This is your Care EveryWhere ID. This could be used by other organizations to access your [facility] medical records  [id number]        Your Vitals Were     Pulse Temperature Height Pulse Oximetry BMI (Body Mass Index)       64 98.9  F (37.2  C) (Oral) 5' 1.5\" (1.562 m) 100% 28.4 kg/m2        Blood Pressure from Last 3 Encounters:   08/14/18 116/70   06/12/18 124/70   06/01/18 123/77    Weight from Last 3 Encounters:   08/14/18 152 lb 12.8 oz (69.3 kg)   06/12/18 153 lb 14.4 oz (69.8 kg)   05/11/18 153 lb 9.6 oz (69.7 kg)              We Performed the Following     CBC with platelets     Comprehensive metabolic panel     EKG 12-lead complete w/read - Clinics     Methicillin Resist/Sens S. aureus PCR     UA reflex to Microscopic and Culture        Primary Care Provider Office Phone # Fax #    Sydnee Miller [phone]       [address] [place] [id number]        Equal Access to Services     LAURA BLANK : Hadii aad ku hadasho Soomaali, waaxda luqadaha, qaybta kaalmada zoey palumbo . So [facility] [phone].    ATENCIÓN: Si habla español, tiene a medrano disposición servicios gratuitos de asistencia lingüística. Llame al [phone].    We comply with applicable federal civil rights laws and [place] laws. We do not discriminate on the basis of race, color, national origin, age, disability, sex, " sexual orientation, or gender identity.            Thank you!     Thank you for choosing [facility]  for your care. Our goal is always to provide you with excellent care. Hearing back from our patients is one way we can continue to improve our services. Please take a few minutes to complete the written survey that you may receive in the mail after your visit with us. Thank you!             Your Updated Medication List - Protect others around you: Learn how to safely use, store and throw away your medicines at www.disposemymeds.org.          This list is accurate as of 8/14/18  8:42 AM.  Always use your most recent med list.                   Brand Name Dispense Instructions for use Diagnosis    AMBIEN 5 MG tablet   Generic drug:  zolpidem      Take 0.5-1 tablets (2.5-5 mg) by mouth nightly as needed for sleep        amLODIPine 10 MG tablet    NORVASC    90 tablet    TAKE ONE TABLET BY MOUTH ONCE DAILY IN THE EVENING    Essential hypertension       aspirin 81 MG tablet     100    ONE DAILY    Unspecified cardiovascular disease       atenolol 100 MG tablet    TENORMIN     Take 100 mg by mouth daily        atorvastatin 40 MG tablet    LIPITOR    90 tablet    TAKE ONE TABLET BY MOUTH ONCE DAILY AT BEDTIME    Hyperlipidemia LDL goal <100       bimatoprost 0.03 % Soln    latisse    3 mL    APPLY ONCE DAILY AT BEDTIME TO UPPER EYELID MARGIN WITH APPLICATOR (NOT LOWER EYELID) AS NEEDED.    Hypotrichosis of eyelid, unspecified laterality       Biotin 1000 MCG Chew      Take 1,000 mcg by mouth daily        CALCIUM 1200 PO      Take 2 tablets by mouth.    Routine general medical examination at a health care facility, Unspecified essential hypertension, Other and unspecified hyperlipidemia, Acute myocardial infarction, unspecified site, subsequent episode of care, Sleep disturbance, unspecified, Generalized anxiety disorder, AAA (abdominal aortic aneurysm) (H)       fluocinonide 0.05 % solution    LIDEX     60 mL    Apply to scalp and top of ears BID for 3-4 weeks. Do not use on face or body folds.    Dermatitis, seborrheic       hydrocortisone 0.2 % cream    WESTCORT    15 g    Apply sparingly to affected area three times daily for 14 days.    Rash and nonspecific skin eruption       ketoconazole 2 % shampoo    NIZORAL    120 mL    Wash hair daily. Lather and let sit for a few minutes on scalp and ears. Rinse.    Dermatitis, seborrheic       lisinopril 20 MG tablet    PRINIVIL/ZESTRIL    180 tablet    Take 1 tablet (20 mg) by mouth 2 times daily    Benign essential hypertension       LORazepam 0.5 MG tablet    ATIVAN    90 tablet    Take 1 tablet by mouth every 6 hours as needed for anxiety.    Sleep disturbance, unspecified, Acute myocardial infarction, unspecified site, subsequent episode of care, Unspecified essential hypertension, Other and unspecified hyperlipidemia, Generalized anxiety disorder, Routine general medical examination at a health care facility, AAA (abdominal aortic aneurysm) (H)       omeprazole 20 MG CR capsule    priLOSEC    90 capsule    Take 1 capsule (20 mg) by mouth daily    Gastroesophageal reflux disease without esophagitis       sertraline 100 MG tablet    ZOLOFT     Take 1 tablet (100 mg) by mouth daily Rx'd by psych    Generalized anxiety disorder       spironolactone 25 MG tablet    ALDACTONE    90 tablet    Take 1 tablet (25 mg) by mouth daily        triamcinolone 0.1 % lotion    KENALOG    60 mL    Apply sparingly once or twice per day as needed to affected area until the skin is better, then stop; REPEAT AS NEEDED    Psoriasis       VITAMIN D PO      Take 1 tablet by mouth

## 2018-08-27 RX ORDER — CLINDAMYCIN PHOSPHATE 900 MG/50ML
900 INJECTION, SOLUTION INTRAVENOUS
Status: CANCELLED | OUTPATIENT
Start: 2018-10-31

## 2018-09-27 DIAGNOSIS — I10 BENIGN ESSENTIAL HYPERTENSION: ICD-10-CM

## 2018-09-27 RX ORDER — LISINOPRIL 20 MG/1
TABLET ORAL
Qty: 180 TABLET | Refills: 0 | Status: SHIPPED | OUTPATIENT
Start: 2018-09-27 | End: 2018-12-26

## 2018-09-27 NOTE — TELEPHONE ENCOUNTER
"Requested Prescriptions   Pending Prescriptions Disp Refills     lisinopril (PRINIVIL/ZESTRIL) 20 MG tablet [Pharmacy Med Name: LISINOPRIL 20MG     TAB] 180 tablet 1     Sig: TAKE ONE TABLET BY MOUTH TWICE DAILY    ACE Inhibitors (Including Combos) Protocol Passed    9/27/2018 12:10 PM       Passed - Blood pressure under 140/90 in past 12 months    BP Readings from Last 3 Encounters:   08/14/18 116/70   06/12/18 124/70   06/01/18 123/77                Passed - Recent (12 mo) or future (30 days) visit within the authorizing provider's specialty    Patient had office visit in the last 12 months or has a visit in the next 30 days with authorizing provider or within the authorizing provider's specialty.  See \"Patient Info\" tab in inbasket, or \"Choose Columns\" in Meds & Orders section of the refill encounter.           Passed - Patient is age 18 or older       Passed - No active pregnancy on record       Passed - Normal serum creatinine on file in past 12 months    Recent Labs   Lab Test  08/14/18   0922   CR  0.80            Passed - Normal serum potassium on file in past 12 months    Recent Labs   Lab Test  08/14/18   0922   POTASSIUM  4.6            Passed - No positive pregnancy test in past 12 months        Last Written Prescription Date:  1/12/2018  Last Fill Quantity: 180,  # refills: 1   Last office visit: 8/14/2018 with prescribing provider:  Sydnee Miller     Future Office Visit:    "

## 2018-09-27 NOTE — TELEPHONE ENCOUNTER
Prescription approved per [facility] Refill Protocol.    Rosie SONI RN, BSN, PHN

## 2018-10-31 ENCOUNTER — HOSPITAL ENCOUNTER (INPATIENT)
Facility: CLINIC | Age: 68
LOS: 1 days | Discharge: HOME | DRG: 330 | End: 2018-11-01
Attending: UROLOGY | Admitting: UROLOGY
Payer: MEDICARE

## 2018-10-31 ENCOUNTER — ANESTHESIA EVENT (OUTPATIENT)
Dept: SURGERY | Facility: CLINIC | Age: 68
DRG: 330 | End: 2018-10-31
Payer: MEDICARE

## 2018-10-31 ENCOUNTER — ANESTHESIA (OUTPATIENT)
Dept: SURGERY | Facility: CLINIC | Age: 68
DRG: 330 | End: 2018-10-31
Payer: MEDICARE

## 2018-10-31 DIAGNOSIS — N81.9 FEMALE GENITAL PROLAPSE, UNSPECIFIED TYPE: ICD-10-CM

## 2018-10-31 DIAGNOSIS — R31.29 MICROSCOPIC HEMATURIA: Primary | ICD-10-CM

## 2018-10-31 LAB
ANION GAP SERPL CALCULATED.3IONS-SCNC: 9 MMOL/L (ref 3–14)
BUN SERPL-MCNC: 14 MG/DL (ref 7–30)
CALCIUM SERPL-MCNC: 9 MG/DL (ref 8.5–10.1)
CHLORIDE SERPL-SCNC: 97 MMOL/L (ref 94–109)
CO2 SERPL-SCNC: 25 MMOL/L (ref 20–32)
CREAT SERPL-MCNC: 0.82 MG/DL (ref 0.52–1.04)
ERYTHROCYTE [DISTWIDTH] IN BLOOD BY AUTOMATED COUNT: 12.9 % (ref 10–15)
GFR SERPL CREATININE-BSD FRML MDRD: 69 ML/MIN/1.7M2
GLUCOSE SERPL-MCNC: 106 MG/DL (ref 70–99)
HCT VFR BLD AUTO: 38.5 % (ref 35–47)
HGB BLD-MCNC: 13.1 G/DL (ref 11.7–15.7)
MCH RBC QN AUTO: 30.1 PG (ref 26.5–33)
MCHC RBC AUTO-ENTMCNC: 34 G/DL (ref 31.5–36.5)
MCV RBC AUTO: 89 FL (ref 78–100)
PLATELET # BLD AUTO: 221 10E9/L (ref 150–450)
POTASSIUM SERPL-SCNC: 3.9 MMOL/L (ref 3.4–5.3)
RBC # BLD AUTO: 4.35 10E12/L (ref 3.8–5.2)
SODIUM SERPL-SCNC: 131 MMOL/L (ref 133–144)
WBC # BLD AUTO: 7.7 10E9/L (ref 4–11)

## 2018-10-31 PROCEDURE — 25000125 ZZHC RX 250: Performed by: NURSE ANESTHETIST, CERTIFIED REGISTERED

## 2018-10-31 PROCEDURE — C1781 MESH (IMPLANTABLE): HCPCS | Performed by: UROLOGY

## 2018-10-31 PROCEDURE — 37000008 ZZH ANESTHESIA TECHNICAL FEE, 1ST 30 MIN: Performed by: UROLOGY

## 2018-10-31 PROCEDURE — 25000128 H RX IP 250 OP 636: Performed by: UROLOGY

## 2018-10-31 PROCEDURE — 0TJB8ZZ INSPECTION OF BLADDER, VIA NATURAL OR ARTIFICIAL OPENING ENDOSCOPIC: ICD-10-PCS | Performed by: UROLOGY

## 2018-10-31 PROCEDURE — 25000132 ZZH RX MED GY IP 250 OP 250 PS 637: Mod: GY | Performed by: UROLOGY

## 2018-10-31 PROCEDURE — 0UT24ZZ RESECTION OF BILATERAL OVARIES, PERCUTANEOUS ENDOSCOPIC APPROACH: ICD-10-PCS | Performed by: UROLOGY

## 2018-10-31 PROCEDURE — 25000125 ZZHC RX 250: Performed by: UROLOGY

## 2018-10-31 PROCEDURE — 0DSP4ZZ REPOSITION RECTUM, PERCUTANEOUS ENDOSCOPIC APPROACH: ICD-10-PCS | Performed by: COLON & RECTAL SURGERY

## 2018-10-31 PROCEDURE — C1771 REP DEV, URINARY, W/SLING: HCPCS | Performed by: UROLOGY

## 2018-10-31 PROCEDURE — 0UT74ZZ RESECTION OF BILATERAL FALLOPIAN TUBES, PERCUTANEOUS ENDOSCOPIC APPROACH: ICD-10-PCS | Performed by: UROLOGY

## 2018-10-31 PROCEDURE — A9270 NON-COVERED ITEM OR SERVICE: HCPCS | Mod: GY | Performed by: UROLOGY

## 2018-10-31 PROCEDURE — 0TSD0ZZ REPOSITION URETHRA, OPEN APPROACH: ICD-10-PCS | Performed by: UROLOGY

## 2018-10-31 PROCEDURE — 25000128 H RX IP 250 OP 636: Performed by: PHYSICIAN ASSISTANT

## 2018-10-31 PROCEDURE — 27210794 ZZH OR GENERAL SUPPLY STERILE: Performed by: UROLOGY

## 2018-10-31 PROCEDURE — 80048 BASIC METABOLIC PNL TOTAL CA: CPT | Performed by: COLON & RECTAL SURGERY

## 2018-10-31 PROCEDURE — 36415 COLL VENOUS BLD VENIPUNCTURE: CPT | Performed by: COLON & RECTAL SURGERY

## 2018-10-31 PROCEDURE — 25800025 ZZH RX 258: Performed by: PHYSICIAN ASSISTANT

## 2018-10-31 PROCEDURE — 25000128 H RX IP 250 OP 636: Performed by: ANESTHESIOLOGY

## 2018-10-31 PROCEDURE — 25000132 ZZH RX MED GY IP 250 OP 250 PS 637: Mod: GY | Performed by: COLON & RECTAL SURGERY

## 2018-10-31 PROCEDURE — 85027 COMPLETE CBC AUTOMATED: CPT | Performed by: COLON & RECTAL SURGERY

## 2018-10-31 PROCEDURE — 25000128 H RX IP 250 OP 636: Performed by: NURSE ANESTHETIST, CERTIFIED REGISTERED

## 2018-10-31 PROCEDURE — 71000012 ZZH RECOVERY PHASE 1 LEVEL 1 FIRST HR: Performed by: UROLOGY

## 2018-10-31 PROCEDURE — A9270 NON-COVERED ITEM OR SERVICE: HCPCS | Mod: GY | Performed by: PHYSICIAN ASSISTANT

## 2018-10-31 PROCEDURE — 37000009 ZZH ANESTHESIA TECHNICAL FEE, EACH ADDTL 15 MIN: Performed by: UROLOGY

## 2018-10-31 PROCEDURE — A9270 NON-COVERED ITEM OR SERVICE: HCPCS | Mod: GY | Performed by: COLON & RECTAL SURGERY

## 2018-10-31 PROCEDURE — 88305 TISSUE EXAM BY PATHOLOGIST: CPT | Mod: 26 | Performed by: COLON & RECTAL SURGERY

## 2018-10-31 PROCEDURE — 27210995 ZZH RX 272: Performed by: UROLOGY

## 2018-10-31 PROCEDURE — 36000085 ZZH SURGERY LEVEL 8 1ST 30 MIN: Performed by: UROLOGY

## 2018-10-31 PROCEDURE — 12000000 ZZH R&B MED SURG/OB

## 2018-10-31 PROCEDURE — 40000169 ZZH STATISTIC PRE-PROCEDURE ASSESSMENT I: Performed by: UROLOGY

## 2018-10-31 PROCEDURE — 25000132 ZZH RX MED GY IP 250 OP 250 PS 637: Mod: GY | Performed by: PHYSICIAN ASSISTANT

## 2018-10-31 PROCEDURE — 25000566 ZZH SEVOFLURANE, EA 15 MIN: Performed by: UROLOGY

## 2018-10-31 PROCEDURE — 36000087 ZZH SURGERY LEVEL 8 EA 15 ADDTL MIN: Performed by: UROLOGY

## 2018-10-31 PROCEDURE — 0JUC3JZ SUPPLEMENT OF PELVIC REGION SUBCUTANEOUS TISSUE AND FASCIA WITH SYNTHETIC SUBSTITUTE, PERCUTANEOUS APPROACH: ICD-10-PCS | Performed by: COLON & RECTAL SURGERY

## 2018-10-31 PROCEDURE — 8E0W4CZ ROBOTIC ASSISTED PROCEDURE OF TRUNK REGION, PERCUTANEOUS ENDOSCOPIC APPROACH: ICD-10-PCS | Performed by: COLON & RECTAL SURGERY

## 2018-10-31 PROCEDURE — 25000128 H RX IP 250 OP 636: Performed by: COLON & RECTAL SURGERY

## 2018-10-31 PROCEDURE — P9041 ALBUMIN (HUMAN),5%, 50ML: HCPCS | Performed by: NURSE ANESTHETIST, CERTIFIED REGISTERED

## 2018-10-31 PROCEDURE — 88305 TISSUE EXAM BY PATHOLOGIST: CPT | Performed by: COLON & RECTAL SURGERY

## 2018-10-31 PROCEDURE — 25800025 ZZH RX 258: Performed by: UROLOGY

## 2018-10-31 PROCEDURE — 0USG4ZZ REPOSITION VAGINA, PERCUTANEOUS ENDOSCOPIC APPROACH: ICD-10-PCS | Performed by: UROLOGY

## 2018-10-31 DEVICE — MESH SLING Y SHAPE RESTORELLE 24X4CM 501420: Type: IMPLANTABLE DEVICE | Site: PELVIS | Status: FUNCTIONAL

## 2018-10-31 DEVICE — MESH SLING OBTRYX-HALO M0068505000: Type: IMPLANTABLE DEVICE | Site: PELVIS | Status: FUNCTIONAL

## 2018-10-31 RX ORDER — ONDANSETRON 2 MG/ML
4 INJECTION INTRAMUSCULAR; INTRAVENOUS EVERY 30 MIN PRN
Status: DISCONTINUED | OUTPATIENT
Start: 2018-10-31 | End: 2018-10-31 | Stop reason: HOSPADM

## 2018-10-31 RX ORDER — ALBUTEROL SULFATE 0.83 MG/ML
2.5 SOLUTION RESPIRATORY (INHALATION)
Status: DISCONTINUED | OUTPATIENT
Start: 2018-10-31 | End: 2018-10-31 | Stop reason: HOSPADM

## 2018-10-31 RX ORDER — PROPOFOL 10 MG/ML
INJECTION, EMULSION INTRAVENOUS CONTINUOUS PRN
Status: DISCONTINUED | OUTPATIENT
Start: 2018-10-31 | End: 2018-10-31

## 2018-10-31 RX ORDER — NALOXONE HYDROCHLORIDE 0.4 MG/ML
.1-.4 INJECTION, SOLUTION INTRAMUSCULAR; INTRAVENOUS; SUBCUTANEOUS
Status: DISCONTINUED | OUTPATIENT
Start: 2018-10-31 | End: 2018-10-31

## 2018-10-31 RX ORDER — CIPROFLOXACIN 2 MG/ML
400 INJECTION, SOLUTION INTRAVENOUS
Status: COMPLETED | OUTPATIENT
Start: 2018-10-31 | End: 2018-10-31

## 2018-10-31 RX ORDER — LIDOCAINE 40 MG/G
CREAM TOPICAL
Status: DISCONTINUED | OUTPATIENT
Start: 2018-10-31 | End: 2018-10-31 | Stop reason: HOSPADM

## 2018-10-31 RX ORDER — ALBUMIN, HUMAN INJ 5% 5 %
SOLUTION INTRAVENOUS CONTINUOUS PRN
Status: DISCONTINUED | OUTPATIENT
Start: 2018-10-31 | End: 2018-10-31

## 2018-10-31 RX ORDER — LORAZEPAM 2 MG/ML
.5-1 INJECTION INTRAMUSCULAR
Status: DISCONTINUED | OUTPATIENT
Start: 2018-10-31 | End: 2018-10-31 | Stop reason: HOSPADM

## 2018-10-31 RX ORDER — ONDANSETRON 4 MG/1
4 TABLET, ORALLY DISINTEGRATING ORAL EVERY 30 MIN PRN
Status: DISCONTINUED | OUTPATIENT
Start: 2018-10-31 | End: 2018-10-31 | Stop reason: HOSPADM

## 2018-10-31 RX ORDER — SODIUM CHLORIDE, SODIUM LACTATE, POTASSIUM CHLORIDE, CALCIUM CHLORIDE 600; 310; 30; 20 MG/100ML; MG/100ML; MG/100ML; MG/100ML
INJECTION, SOLUTION INTRAVENOUS CONTINUOUS
Status: DISCONTINUED | OUTPATIENT
Start: 2018-10-31 | End: 2018-10-31 | Stop reason: HOSPADM

## 2018-10-31 RX ORDER — EPHEDRINE SULFATE 50 MG/ML
INJECTION, SOLUTION INTRAMUSCULAR; INTRAVENOUS; SUBCUTANEOUS PRN
Status: DISCONTINUED | OUTPATIENT
Start: 2018-10-31 | End: 2018-10-31

## 2018-10-31 RX ORDER — FENTANYL CITRATE 50 UG/ML
25-50 INJECTION, SOLUTION INTRAMUSCULAR; INTRAVENOUS
Status: DISCONTINUED | OUTPATIENT
Start: 2018-10-31 | End: 2018-10-31 | Stop reason: HOSPADM

## 2018-10-31 RX ORDER — HYDROMORPHONE HYDROCHLORIDE 1 MG/ML
.3-.5 INJECTION, SOLUTION INTRAMUSCULAR; INTRAVENOUS; SUBCUTANEOUS
Status: DISCONTINUED | OUTPATIENT
Start: 2018-10-31 | End: 2018-11-01 | Stop reason: HOSPADM

## 2018-10-31 RX ORDER — CIPROFLOXACIN 2 MG/ML
400 INJECTION, SOLUTION INTRAVENOUS EVERY 12 HOURS
Status: DISCONTINUED | OUTPATIENT
Start: 2018-10-31 | End: 2018-11-01 | Stop reason: HOSPADM

## 2018-10-31 RX ORDER — LIDOCAINE HYDROCHLORIDE 20 MG/ML
INJECTION, SOLUTION INFILTRATION; PERINEURAL PRN
Status: DISCONTINUED | OUTPATIENT
Start: 2018-10-31 | End: 2018-10-31

## 2018-10-31 RX ORDER — SERTRALINE HYDROCHLORIDE 100 MG/1
100 TABLET, FILM COATED ORAL DAILY
Status: DISCONTINUED | OUTPATIENT
Start: 2018-11-01 | End: 2018-11-01 | Stop reason: HOSPADM

## 2018-10-31 RX ORDER — VECURONIUM BROMIDE 1 MG/ML
INJECTION, POWDER, LYOPHILIZED, FOR SOLUTION INTRAVENOUS PRN
Status: DISCONTINUED | OUTPATIENT
Start: 2018-10-31 | End: 2018-10-31

## 2018-10-31 RX ORDER — PHENAZOPYRIDINE HYDROCHLORIDE 200 MG/1
200 TABLET, FILM COATED ORAL
Status: DISCONTINUED | OUTPATIENT
Start: 2018-10-31 | End: 2018-10-31 | Stop reason: HOSPADM

## 2018-10-31 RX ORDER — ESTRADIOL 0.1 MG/G
CREAM VAGINAL
Qty: 42.5 G | Refills: 0 | Status: SHIPPED | OUTPATIENT
Start: 2018-10-31 | End: 2019-09-03

## 2018-10-31 RX ORDER — ZOLPIDEM TARTRATE 5 MG/1
2.5 TABLET ORAL
Status: DISCONTINUED | OUTPATIENT
Start: 2018-10-31 | End: 2018-11-01 | Stop reason: HOSPADM

## 2018-10-31 RX ORDER — DEXAMETHASONE SODIUM PHOSPHATE 4 MG/ML
INJECTION, SOLUTION INTRA-ARTICULAR; INTRALESIONAL; INTRAMUSCULAR; INTRAVENOUS; SOFT TISSUE PRN
Status: DISCONTINUED | OUTPATIENT
Start: 2018-10-31 | End: 2018-10-31

## 2018-10-31 RX ORDER — DEXTROSE MONOHYDRATE, SODIUM CHLORIDE, AND POTASSIUM CHLORIDE 50; 1.49; 4.5 G/1000ML; G/1000ML; G/1000ML
INJECTION, SOLUTION INTRAVENOUS CONTINUOUS
Status: DISCONTINUED | OUTPATIENT
Start: 2018-10-31 | End: 2018-11-01

## 2018-10-31 RX ORDER — PROPOFOL 10 MG/ML
INJECTION, EMULSION INTRAVENOUS PRN
Status: DISCONTINUED | OUTPATIENT
Start: 2018-10-31 | End: 2018-10-31

## 2018-10-31 RX ORDER — KETOROLAC TROMETHAMINE 15 MG/ML
15 INJECTION, SOLUTION INTRAMUSCULAR; INTRAVENOUS EVERY 6 HOURS
Status: DISCONTINUED | OUTPATIENT
Start: 2018-10-31 | End: 2018-11-01 | Stop reason: HOSPADM

## 2018-10-31 RX ORDER — GLYCOPYRROLATE 0.2 MG/ML
INJECTION, SOLUTION INTRAMUSCULAR; INTRAVENOUS PRN
Status: DISCONTINUED | OUTPATIENT
Start: 2018-10-31 | End: 2018-10-31

## 2018-10-31 RX ORDER — LIDOCAINE 40 MG/G
CREAM TOPICAL
Status: DISCONTINUED | OUTPATIENT
Start: 2018-10-31 | End: 2018-11-01 | Stop reason: HOSPADM

## 2018-10-31 RX ORDER — ALBUTEROL SULFATE 0.83 MG/ML
2.5 SOLUTION RESPIRATORY (INHALATION) EVERY 4 HOURS PRN
Status: DISCONTINUED | OUTPATIENT
Start: 2018-10-31 | End: 2018-10-31 | Stop reason: HOSPADM

## 2018-10-31 RX ORDER — HYDROMORPHONE HYDROCHLORIDE 1 MG/ML
.3-.5 INJECTION, SOLUTION INTRAMUSCULAR; INTRAVENOUS; SUBCUTANEOUS EVERY 5 MIN PRN
Status: DISCONTINUED | OUTPATIENT
Start: 2018-10-31 | End: 2018-10-31 | Stop reason: HOSPADM

## 2018-10-31 RX ORDER — NALOXONE HYDROCHLORIDE 0.4 MG/ML
.1-.4 INJECTION, SOLUTION INTRAMUSCULAR; INTRAVENOUS; SUBCUTANEOUS
Status: DISCONTINUED | OUTPATIENT
Start: 2018-10-31 | End: 2018-11-01 | Stop reason: HOSPADM

## 2018-10-31 RX ORDER — FENTANYL CITRATE 50 UG/ML
INJECTION, SOLUTION INTRAMUSCULAR; INTRAVENOUS PRN
Status: DISCONTINUED | OUTPATIENT
Start: 2018-10-31 | End: 2018-10-31

## 2018-10-31 RX ORDER — ONDANSETRON 2 MG/ML
4 INJECTION INTRAMUSCULAR; INTRAVENOUS EVERY 6 HOURS PRN
Status: DISCONTINUED | OUTPATIENT
Start: 2018-10-31 | End: 2018-11-01 | Stop reason: HOSPADM

## 2018-10-31 RX ORDER — ONDANSETRON 2 MG/ML
INJECTION INTRAMUSCULAR; INTRAVENOUS PRN
Status: DISCONTINUED | OUTPATIENT
Start: 2018-10-31 | End: 2018-10-31

## 2018-10-31 RX ORDER — BUPIVACAINE HYDROCHLORIDE 2.5 MG/ML
INJECTION, SOLUTION INFILTRATION; PERINEURAL PRN
Status: DISCONTINUED | OUTPATIENT
Start: 2018-10-31 | End: 2018-10-31 | Stop reason: HOSPADM

## 2018-10-31 RX ORDER — ACETAMINOPHEN 325 MG/1
975 TABLET ORAL ONCE
Status: COMPLETED | OUTPATIENT
Start: 2018-10-31 | End: 2018-10-31

## 2018-10-31 RX ORDER — ACETAMINOPHEN 325 MG/1
975 TABLET ORAL EVERY 8 HOURS
Status: DISCONTINUED | OUTPATIENT
Start: 2018-10-31 | End: 2018-11-01 | Stop reason: HOSPADM

## 2018-10-31 RX ORDER — CIPROFLOXACIN 500 MG/1
500 TABLET, FILM COATED ORAL 2 TIMES DAILY
Qty: 10 TABLET | Refills: 0 | Status: SHIPPED | OUTPATIENT
Start: 2018-10-31 | End: 2018-11-05

## 2018-10-31 RX ORDER — METOPROLOL TARTRATE 1 MG/ML
5 INJECTION, SOLUTION INTRAVENOUS EVERY 6 HOURS
Status: DISCONTINUED | OUTPATIENT
Start: 2018-11-01 | End: 2018-11-01

## 2018-10-31 RX ORDER — MEPERIDINE HYDROCHLORIDE 25 MG/ML
12.5 INJECTION INTRAMUSCULAR; INTRAVENOUS; SUBCUTANEOUS EVERY 5 MIN PRN
Status: DISCONTINUED | OUTPATIENT
Start: 2018-10-31 | End: 2018-10-31 | Stop reason: HOSPADM

## 2018-10-31 RX ORDER — KETOROLAC TROMETHAMINE 15 MG/ML
15 INJECTION, SOLUTION INTRAMUSCULAR; INTRAVENOUS
Status: DISCONTINUED | OUTPATIENT
Start: 2018-10-31 | End: 2018-10-31 | Stop reason: HOSPADM

## 2018-10-31 RX ORDER — MAGNESIUM HYDROXIDE 1200 MG/15ML
LIQUID ORAL PRN
Status: DISCONTINUED | OUTPATIENT
Start: 2018-10-31 | End: 2018-10-31 | Stop reason: HOSPADM

## 2018-10-31 RX ORDER — BUPIVACAINE HYDROCHLORIDE AND EPINEPHRINE 5; 5 MG/ML; UG/ML
INJECTION, SOLUTION PERINEURAL PRN
Status: DISCONTINUED | OUTPATIENT
Start: 2018-10-31 | End: 2018-10-31 | Stop reason: HOSPADM

## 2018-10-31 RX ORDER — NEOSTIGMINE METHYLSULFATE 1 MG/ML
VIAL (ML) INJECTION PRN
Status: DISCONTINUED | OUTPATIENT
Start: 2018-10-31 | End: 2018-10-31

## 2018-10-31 RX ORDER — TRIAMCINOLONE ACETONIDE 1 MG/G
CREAM TOPICAL 2 TIMES DAILY PRN
COMMUNITY
End: 2019-09-03

## 2018-10-31 RX ORDER — CIPROFLOXACIN 2 MG/ML
400 INJECTION, SOLUTION INTRAVENOUS SEE ADMIN INSTRUCTIONS
Status: DISCONTINUED | OUTPATIENT
Start: 2018-10-31 | End: 2018-10-31 | Stop reason: HOSPADM

## 2018-10-31 RX ADMIN — KETOROLAC TROMETHAMINE 15 MG: 15 INJECTION, SOLUTION INTRAMUSCULAR; INTRAVENOUS at 18:40

## 2018-10-31 RX ADMIN — VECURONIUM BROMIDE 1 MG: 1 INJECTION, POWDER, LYOPHILIZED, FOR SOLUTION INTRAVENOUS at 08:48

## 2018-10-31 RX ADMIN — NEOSTIGMINE METHYLSULFATE 4 MG: 1 INJECTION, SOLUTION INTRAVENOUS at 10:38

## 2018-10-31 RX ADMIN — PHENAZOPYRIDINE HYDROCHLORIDE 200 MG: 200 TABLET ORAL at 06:28

## 2018-10-31 RX ADMIN — ALBUMIN (HUMAN): 12.5 SOLUTION INTRAVENOUS at 07:39

## 2018-10-31 RX ADMIN — PHENYLEPHRINE HYDROCHLORIDE 100 MCG: 10 INJECTION, SOLUTION INTRAMUSCULAR; INTRAVENOUS; SUBCUTANEOUS at 08:15

## 2018-10-31 RX ADMIN — LIDOCAINE HYDROCHLORIDE 80 MG: 20 INJECTION, SOLUTION INFILTRATION; PERINEURAL at 07:31

## 2018-10-31 RX ADMIN — PHENYLEPHRINE HYDROCHLORIDE 150 MCG: 10 INJECTION, SOLUTION INTRAMUSCULAR; INTRAVENOUS; SUBCUTANEOUS at 07:49

## 2018-10-31 RX ADMIN — DEXAMETHASONE SODIUM PHOSPHATE 4 MG: 4 INJECTION, SOLUTION INTRA-ARTICULAR; INTRALESIONAL; INTRAMUSCULAR; INTRAVENOUS; SOFT TISSUE at 07:31

## 2018-10-31 RX ADMIN — KETOROLAC TROMETHAMINE 15 MG: 15 INJECTION, SOLUTION INTRAMUSCULAR; INTRAVENOUS at 14:08

## 2018-10-31 RX ADMIN — PHENYLEPHRINE HYDROCHLORIDE 150 MCG: 10 INJECTION, SOLUTION INTRAMUSCULAR; INTRAVENOUS; SUBCUTANEOUS at 07:43

## 2018-10-31 RX ADMIN — ONDANSETRON 4 MG: 2 INJECTION INTRAMUSCULAR; INTRAVENOUS at 10:32

## 2018-10-31 RX ADMIN — Medication 5 MG: at 08:46

## 2018-10-31 RX ADMIN — Medication 10 MG: at 08:31

## 2018-10-31 RX ADMIN — ACETAMINOPHEN 975 MG: 325 TABLET, FILM COATED ORAL at 06:28

## 2018-10-31 RX ADMIN — PHENYLEPHRINE HYDROCHLORIDE 150 MCG: 10 INJECTION, SOLUTION INTRAMUSCULAR; INTRAVENOUS; SUBCUTANEOUS at 08:01

## 2018-10-31 RX ADMIN — PHENYLEPHRINE HYDROCHLORIDE 150 MCG: 10 INJECTION, SOLUTION INTRAMUSCULAR; INTRAVENOUS; SUBCUTANEOUS at 07:59

## 2018-10-31 RX ADMIN — PHENYLEPHRINE HYDROCHLORIDE 100 MCG: 10 INJECTION, SOLUTION INTRAMUSCULAR; INTRAVENOUS; SUBCUTANEOUS at 10:45

## 2018-10-31 RX ADMIN — GLYCOPYRROLATE 0.6 MG: 0.2 INJECTION, SOLUTION INTRAMUSCULAR; INTRAVENOUS at 10:38

## 2018-10-31 RX ADMIN — SODIUM CHLORIDE, POTASSIUM CHLORIDE, SODIUM LACTATE AND CALCIUM CHLORIDE: 600; 310; 30; 20 INJECTION, SOLUTION INTRAVENOUS at 08:10

## 2018-10-31 RX ADMIN — PROPOFOL 150 MG: 10 INJECTION, EMULSION INTRAVENOUS at 07:31

## 2018-10-31 RX ADMIN — PHENYLEPHRINE HYDROCHLORIDE 0.25 MCG/KG/MIN: 10 INJECTION, SOLUTION INTRAMUSCULAR; INTRAVENOUS; SUBCUTANEOUS at 08:45

## 2018-10-31 RX ADMIN — METRONIDAZOLE 500 MG: 500 INJECTION, SOLUTION INTRAVENOUS at 07:57

## 2018-10-31 RX ADMIN — Medication 5 MG: at 10:45

## 2018-10-31 RX ADMIN — PHENYLEPHRINE HYDROCHLORIDE 100 MCG: 10 INJECTION, SOLUTION INTRAMUSCULAR; INTRAVENOUS; SUBCUTANEOUS at 07:37

## 2018-10-31 RX ADMIN — SODIUM CHLORIDE, POTASSIUM CHLORIDE, SODIUM LACTATE AND CALCIUM CHLORIDE: 600; 310; 30; 20 INJECTION, SOLUTION INTRAVENOUS at 06:29

## 2018-10-31 RX ADMIN — ONDANSETRON 4 MG: 2 INJECTION INTRAMUSCULAR; INTRAVENOUS at 11:19

## 2018-10-31 RX ADMIN — PHENYLEPHRINE HYDROCHLORIDE 100 MCG: 10 INJECTION, SOLUTION INTRAMUSCULAR; INTRAVENOUS; SUBCUTANEOUS at 07:55

## 2018-10-31 RX ADMIN — ROCURONIUM BROMIDE 50 MG: 10 INJECTION INTRAVENOUS at 07:31

## 2018-10-31 RX ADMIN — PROPOFOL 50 MCG/KG/MIN: 10 INJECTION, EMULSION INTRAVENOUS at 07:46

## 2018-10-31 RX ADMIN — SODIUM CHLORIDE, POTASSIUM CHLORIDE, SODIUM LACTATE AND CALCIUM CHLORIDE: 600; 310; 30; 20 INJECTION, SOLUTION INTRAVENOUS at 10:34

## 2018-10-31 RX ADMIN — Medication 5 MG: at 08:02

## 2018-10-31 RX ADMIN — CIPROFLOXACIN 400 MG: 2 INJECTION INTRAVENOUS at 07:33

## 2018-10-31 RX ADMIN — Medication 5 MG: at 08:44

## 2018-10-31 RX ADMIN — CIPROFLOXACIN 400 MG: 2 INJECTION, SOLUTION INTRAVENOUS at 18:40

## 2018-10-31 RX ADMIN — FENTANYL CITRATE 50 MCG: 50 INJECTION, SOLUTION INTRAMUSCULAR; INTRAVENOUS at 10:30

## 2018-10-31 RX ADMIN — FENTANYL CITRATE 75 MCG: 50 INJECTION, SOLUTION INTRAMUSCULAR; INTRAVENOUS at 07:31

## 2018-10-31 RX ADMIN — FENTANYL CITRATE 75 MCG: 50 INJECTION, SOLUTION INTRAMUSCULAR; INTRAVENOUS at 08:06

## 2018-10-31 RX ADMIN — ACETAMINOPHEN 975 MG: 325 TABLET, FILM COATED ORAL at 21:58

## 2018-10-31 RX ADMIN — ACETAMINOPHEN 975 MG: 325 TABLET, FILM COATED ORAL at 14:08

## 2018-10-31 RX ADMIN — DEXMEDETOMIDINE HYDROCHLORIDE 12 MCG: 100 INJECTION, SOLUTION INTRAVENOUS at 07:23

## 2018-10-31 RX ADMIN — ZOLPIDEM TARTRATE 5 MG: 5 TABLET, FILM COATED ORAL at 22:26

## 2018-10-31 RX ADMIN — POTASSIUM CHLORIDE, DEXTROSE MONOHYDRATE AND SODIUM CHLORIDE: 150; 5; 450 INJECTION, SOLUTION INTRAVENOUS at 13:06

## 2018-10-31 RX ADMIN — Medication 10 MG: at 08:15

## 2018-10-31 RX ADMIN — VECURONIUM BROMIDE 0.5 MG: 1 INJECTION, POWDER, LYOPHILIZED, FOR SOLUTION INTRAVENOUS at 09:45

## 2018-10-31 ASSESSMENT — LIFESTYLE VARIABLES: TOBACCO_USE: 1

## 2018-10-31 ASSESSMENT — ACTIVITIES OF DAILY LIVING (ADL)
ADLS_ACUITY_SCORE: 11
ADLS_ACUITY_SCORE: 11

## 2018-10-31 ASSESSMENT — COPD QUESTIONNAIRES: COPD: 0

## 2018-10-31 ASSESSMENT — ENCOUNTER SYMPTOMS
SEIZURES: 0
DYSRHYTHMIAS: 0

## 2018-10-31 NOTE — ANESTHESIA CARE TRANSFER NOTE
Patient: Liz Haider    Procedure(s):  ROBOTIC ASSISTED XI SACROCOLPOPEXY, CYSTOSCOPY (SITNIKOVA), BILATERAL SALPINGO-OORPHORECTOMY, MID URETHRAL SLING  ROBOTIC ASSISTED XI VENTRAL RECTOPEXY (DEINS)   DAVINCI SALPINGO-OOPHORECTOMY INCLUDING BILATERAL  DAVINCI SACROCOLPOPEXY, MIDURETHRAL SLING, CYSTOSCOPY    Diagnosis: RECTAL PROLAPSE AND GENITAL PROLAPSE   Diagnosis Additional Information: No value filed.    Anesthesia Type:   General, ETT     Note:  Airway :Face Mask  Patient transferred to:PACU  Comments: Neuromuscular blockade reversed after TOF 3/4, spontaneous respirations, adequate tidal volumes, followed commands to voice, oropharynx suctioned with soft flexible catheter, extubated atraumatically, extubated with suction, airway patent after extubation.  Oxygen via facemask at 8 liters per minute to PACU. Oxygen tubing connected to wall O2 in PACU, SpO2, NiBP, and EKG monitors and alarms on and functioning, Deborah Hugger warmer connected to patient gown, report on patient's clinical status given to PACU RN, RN questions answered. Handoff Report: Identifed the Patient, Identified the Reponsible Provider, Reviewed the pertinent medical history, Discussed the surgical course, Reviewed Intra-OP anesthesia mangement and issues during anesthesia, Set expectations for post-procedure period and Allowed opportunity for questions and acknowledgement of understanding      Vitals: (Last set prior to Anesthesia Care Transfer)    CRNA VITALS  10/31/2018 1036 - 10/31/2018 1117      10/31/2018             NIBP: 98/57    Pulse: 63    SpO2: 99 %                Electronically Signed By: Kirsten Concepcion  October 31, 2018  11:17 AM

## 2018-10-31 NOTE — ANESTHESIA POSTPROCEDURE EVALUATION
Patient: Liz Haider    Procedure(s):  ROBOTIC ASSISTED XI SACROCOLPOPEXY, CYSTOSCOPY (CEFERINO), BILATERAL SALPINGO-OORPHORECTOMY, MID URETHRAL SLING  ROBOTIC ASSISTED XI VENTRAL RECTOPEXY (DENIS)   DAVINCI SALPINGO-OOPHORECTOMY INCLUDING BILATERAL  DAVINCI SACROCOLPOPEXY, MIDURETHRAL SLING, CYSTOSCOPY    Diagnosis:RECTAL PROLAPSE AND GENITAL PROLAPSE   Diagnosis Additional Information: No value filed.    Anesthesia Type:  General, ETT    Note:  Anesthesia Post Evaluation    Patient location during evaluation: Bedside  Patient participation: Able to fully participate in evaluation  Level of consciousness: awake and alert  Pain management: adequate  Airway patency: patent  Cardiovascular status: acceptable  Respiratory status: acceptable  Hydration status: acceptable  PONV: none             Last vitals:  Vitals:    10/31/18 1408 10/31/18 1500 10/31/18 1528   BP: 115/83  94/51   Pulse:      Resp: 20 18 18   Temp: 36.1  C (97  F)  35.9  C (96.7  F)   SpO2:   95%         Electronically Signed By: Miguel Noyola MD  October 31, 2018  4:17 PM

## 2018-10-31 NOTE — BRIEF OP NOTE
[facility] Brief Operative Note    Pre-operative diagnosis: Rectal Prolapse, enterocele, rectocele, urinary incontinence   Post-operative diagnosis Rectal Prolapse, enterocele, rectocele, urinary incontinence   Procedure: 1. Robotic ventral rectopexy (Lino)  2. Rectocele repair (Lino)  3. Robotic BSO (Pritesh)  4. Robotic sacrocolpopexy (Pritesh)  5. Cystoscopy and midurethral sling (Pritesh)   Surgeon(s): Surgeon(s) and Role:  Panel 1:     * Turner Jonas MD - Primary    Panel 2:     * Kirsten Huynh MD - Primary     * Christin Ashford PA-C - Assisting     * Marika Lai PA-C - Assisting   Estimated blood loss: 5 ml    Specimens:   ID Type Source Tests Collected by Time Destination   A :  Tissue Fallopian Tube and Ovary, Bilateral SURGICAL PATHOLOGY EXAM Kirsten Huynh MD 10/31/2018 10:39 AM       Findings: Rectal width 4 cm at the level of the levators.  Redundant pelvic tissues.  Prolapse fully reduced after repair.  Bilateral fallopian tubes and ovaries removed by Dr. Jonas.     Kirsten Huynh MD  [facility]  Pager:  [phone]  Phone: [phone]  Fax: [phone]          ADDENDUM:    PATIENT DATA  Indicate Y or N:  Home O2 No  Hemodialysis  No  Transplant patient  No  Cirrhosis  No  Steroids in last 30 days  No  Immunomodulators in last 30 days  No  Anticoagulation at time of surgery  No   List medication na  Prior abdominal surgery  Yes  Pelvic irradiation  No    Albumin within 30 days if known unknown   Hgb within 30 days if known    Hemoglobin   Date Value Ref Range Status   10/31/2018 13.1 11.7 - 15.7 g/dL Final   ]  Cr within 30 days if known    Creatinine   Date Value Ref Range Status   10/31/2018 0.82 0.52 - 1.04 mg/dL Final   ]  Body mass index is 28.15 kg/(m^2).      OR DATA  Emergent  No   <24 hours  No   <1 week  No  Bowel Prep Yes  Antibiotics  Yes  DVT prophylaxis    Heparin  No   SCD  Yes   None  No  Drain   No  ASA (1,2,3,4) 1  OR time (min) 165  Stents  No  Transfuse >/= 2U  No  Anastomosis   Stapled  No   Handsewn  No  Leak Test na

## 2018-10-31 NOTE — IP AVS SNAPSHOT
MRN:[id number]                      After Visit Summary   10/31/2018    Liz Haider    MRN: [id number]           Thank you!     Thank you for choosing [facility] for your care. Our goal is always to provide you with excellent care. Hearing back from our patients is one way we can continue to improve our services. Please take a few minutes to complete the written survey that you may receive in the mail after you visit with us. Thank you!        Patient Information     Date Of Birth          1950        Designated Caregiver       Most Recent Value    Caregiver    Will someone help with your care after discharge? yes    Name of designated caregiver Jhon     Phone number of caregiver [phone]    Caregiver address [id number] [address] [place] [address]      About your hospital stay     You were admitted on:  October 31, 2018 You last received care in the:  [address]    You were discharged on:  November 1, 2018        Reason for your hospital stay       The patient was in the hospital to have surgery for pelvic organ prolapse                  Who to Call     For medical emergencies, please call 911.  For non-urgent questions about your medical care, please call your primary care provider or clinic, [phone]  For questions related to your surgery, please call your surgery clinic        Attending Provider     Provider Specialty    Turner Jonas MD Urology       Primary Care Provider Office Phone # Fax #    Sydnee Miller [phone]      Follow-up Appointments     Follow Up       F/u with DR. Jonas in one month            Follow-up and recommended labs and tests        Post-operative Instructions for Robotic Ventral Rectopexy    Wound Care ~ Your incisions will be covered with a clear paste called Dermabond.  This will typically flake off of the skin in 7-10 days after surgery.  You can shower and allow soap and water to run over all of  "your incisions.  It is common for the incision on the right side of the abdomen to hurt more than the other incisions (this incision has a deeper stitch placed in the muscle).  This will improve with time and you can place a warm or cool pack over the incisions for comfort.   If you have rectal soreness or discomfort then you can sit in a warm bath in your own bathtub starting 3 days after surgery.  Try to avoid soaking your abdominal incisions underwater.   Do not use public baths, pools, lakes or hot tubs for 6 weeks after surgery.      Diet ~ Stay on full liquid diet until clearly passing gas. Then no dietary restrictions. Drink plenty of water.  It is normal for your intestines to take several days to fully \"wake up\" and begin working normally after surgery.  Passing flatus and loose stools is a good sign that the intestines are starting to work again.  Eating soft foods or easily digested foods is recommended until you feel that your intestines are working well.      Bowel Habits ~ It may take several days for the stools to become mostly solid, and several weeks for bowel habits to follow a \"normal\" and reliable pattern.  Immediately after surgery, your stools will be soft/liquid.  If you normally take stool softeners daily, then do not resume this until your stools start to become more formed.    It is normal to \"push\" gently when passing a bowel movement and this is okay after surgery.  You need to avoid sitting on the toilet and straining to have a bowel movement.     Activity/Lifting/Exercise ~ Light activity is encouraged.  You should do plenty of walking. You may return to light duty at work in 4 weeks or when you feel able.  No heavy lifting or strenuous exercise for 8-10 weeks.  No sexual intercourse for 8 weeks or at the instruction of your uro-gynecologist.    Pain Management ~ For mild pain you may take Ibuprofen or Tylenol.  You can also place a warm or cool pack over your incisions for comfort.  If " "you have rectal soreness then refer to the instructions under wound care.   You may be given a prescription for narcotic pain medication for moderate pain.  Narcotics can cause or worsen constipation, so avoid taking narcotics if at all possible.  If you do take narcotic pain pills, then eat foods that have fiber and drink plenty of water to counteract the effects of narcotics.     When to Call the Doctor ~ Worsening or persistent pain, fever > 101 degrees F, chills, rashes, nausea/vomiting, persistent constipation, concerns with your incisions - increased redness, bleeding, swelling, or separation of incision, discharge or foul smell.     Follow up Care ~  You will have a post op appointment with Dr. Huynh, or Chyna Ashford PA-C, 6 weeks after surgery.  Call Dr. Huynh's office if you need to reschedule this appointment or if you have any questions prior to this appointment.     Contact Information:  Dr. Kirsten Huynh  [phone]  YANIQUE Liao  [phone]    [facility]  [address], [address]  [address]                  Pending Results     No orders found for last 3 day(s).            Statement of Approval     Ordered          11/01/18 0841  I have reviewed and agree with all the recommendations and orders detailed in this document.  EFFECTIVE NOW     Approved and electronically signed by:  Christin Ashford PA-C             Admission Information     Date & Time Provider Department Dept. Phone    10/31/2018 Turner Jonas MD [place] [address] Oncology [phone]      Your Vitals Were     Blood Pressure Pulse Temperature Respirations Height Weight    98/54 (BP Location: Right arm) 56 97.2  F (36.2  C) (Oral) 16 1.549 m (5' 1\") 67.6 kg (149 lb)    Pulse Oximetry BMI (Body Mass Index)                97% 28.15 kg/m2          MyChart Information     [facility] gives you secure access to your electronic health record. If you see a primary care provider, you can " also send messages to your care team and make appointments. If you have questions, please call your primary care clinic.  If you do not have a primary care provider, please call [phone] and they will assist you.        Care EveryWhere ID     This is your Care EveryWhere ID. This could be used by other organizations to access your [facility] medical records  [id number]        Equal Access to Services     RONAN BLANK : Hadii aad ku hadasho Soomaali, waaxda luqadaha, qaybta kaalmada adeegyada, zoey carbone hayangelia grayfrankshaila tenorio . So [facility] [phone].    ATENCIÓN: Si habla español, tiene a medrano disposición servicios gratuitos de asistencia lingüística. Yung al [phone].    We comply with applicable federal civil rights laws and [place] laws. We do not discriminate on the basis of race, color, national origin, age, disability, sex, sexual orientation, or gender identity.               Review of your medicines      START taking        Dose / Directions    ciprofloxacin 500 MG tablet   Commonly known as:  CIPRO   Used for:  Microscopic hematuria        Dose:  500 mg   Take 1 tablet (500 mg) by mouth 2 times daily for 5 days   Quantity:  10 tablet   Refills:  0       estradiol 0.1 MG/GM cream   Commonly known as:  ESTRACE VAGINAL   Used for:  Microscopic hematuria        Pea size on a finger to the vagina Q hs   Quantity:  42.5 g   Refills:  0       HYDROcodone-acetaminophen 5-325 MG per tablet   Commonly known as:  NORCO        Dose:  1 tablet   Take 1 tablet by mouth every 4 hours as needed for pain   Quantity:  15 tablet   Refills:  0         CONTINUE these medicines which may have CHANGED, or have new prescriptions. If we are uncertain of the size of tablets/capsules you have at home, strength may be listed as something that might have changed.        Dose / Directions    amLODIPine 10 MG tablet   Commonly known as:  NORVASC   This may have changed:    - how much to take  - how to take this  - when to  take this  - additional instructions   Used for:  Essential hypertension        TAKE ONE TABLET BY MOUTH ONCE DAILY IN THE EVENING   Quantity:  90 tablet   Refills:  3       atorvastatin 40 MG tablet   Commonly known as:  LIPITOR   This may have changed:    - how much to take  - how to take this  - when to take this  - additional instructions   Used for:  Hyperlipidemia LDL goal <100        TAKE ONE TABLET BY MOUTH ONCE DAILY AT BEDTIME   Quantity:  90 tablet   Refills:  1       fluocinonide 0.05 % solution   Commonly known as:  LIDEX   This may have changed:    - how to take this  - when to take this  - reasons to take this  - additional instructions   Used for:  Dermatitis, seborrheic        Apply to scalp and top of ears BID for 3-4 weeks. Do not use on face or body folds.   Quantity:  60 mL   Refills:  0       hydrocortisone 0.2 % cream   Commonly known as:  WESTCORT   This may have changed:    - how to take this  - when to take this  - reasons to take this  - additional instructions   Used for:  Rash and nonspecific skin eruption        Apply sparingly to affected area three times daily for 14 days.   Quantity:  15 g   Refills:  1       ketoconazole 2 % shampoo   Commonly known as:  NIZORAL   This may have changed:    - how to take this  - when to take this  - additional instructions   Used for:  Dermatitis, seborrheic        Wash hair daily. Lather and let sit for a few minutes on scalp and ears. Rinse.   Quantity:  120 mL   Refills:  6       * triamcinolone 0.1 % cream   Commonly known as:  KENALOG   This may have changed:  Another medication with the same name was changed. Make sure you understand how and when to take each.        Apply topically 2 times daily as needed for irritation   Refills:  0       * triamcinolone 0.1 % lotion   Commonly known as:  KENALOG   This may have changed:    - how to take this  - when to take this  - reasons to take this  - additional instructions   Used for:  Psoriasis         Apply sparingly once or twice per day as needed to affected area until the skin is better, then stop; REPEAT AS NEEDED   Quantity:  60 mL   Refills:  2       * Notice:  This list has 2 medication(s) that are the same as other medications prescribed for you. Read the directions carefully, and ask your doctor or other care provider to review them with you.      CONTINUE these medicines which have NOT CHANGED        Dose / Directions    AMBIEN 5 MG tablet   Generic drug:  zolpidem        Dose:  5 mg   Take 5 mg by mouth At Bedtime   Refills:  0       ASPIRIN PO        Dose:  81 mg   Take 81 mg by mouth daily   Refills:  0       atenolol 100 MG tablet   Commonly known as:  TENORMIN        Dose:  100 mg   Take 100 mg by mouth daily   Refills:  0       bimatoprost 0.03 % Soln   Commonly known as:  latisse   Used for:  Hypotrichosis of eyelid, unspecified laterality        APPLY ONCE DAILY AT BEDTIME TO UPPER EYELID MARGIN WITH APPLICATOR (NOT LOWER EYELID) AS NEEDED.   Quantity:  3 mL   Refills:  1       Biotin 1000 MCG Chew        Dose:  1000 mcg   Take 1,000 mcg by mouth daily   Refills:  0       CALCIUM 1200 PO   Used for:  Routine general medical examination at a health care facility, Unspecified essential hypertension, Other and unspecified hyperlipidemia, Acute myocardial infarction, unspecified site, subsequent episode of care, Sleep disturbance, unspecified, Generalized anxiety disorder, AAA (abdominal aortic aneurysm) (H)        Dose:  1 tablet   Take 1 tablet by mouth daily   Refills:  0       lisinopril 20 MG tablet   Commonly known as:  PRINIVIL/ZESTRIL   Used for:  Benign essential hypertension        TAKE ONE TABLET BY MOUTH TWICE DAILY   Quantity:  180 tablet   Refills:  0       LORazepam 0.5 MG tablet   Commonly known as:  ATIVAN   Used for:  Sleep disturbance, unspecified, Acute myocardial infarction, unspecified site, subsequent episode of care, Unspecified essential hypertension, Other and unspecified  hyperlipidemia, Generalized anxiety disorder, Routine general medical examination at a health care facility, AAA (abdominal aortic aneurysm) (H)        Dose:  0.5 mg   Take 1 tablet by mouth every 6 hours as needed for anxiety.   Quantity:  90 tablet   Refills:  4       omeprazole 20 MG CR capsule   Commonly known as:  priLOSEC   Used for:  Gastroesophageal reflux disease without esophagitis        Dose:  20 mg   Take 1 capsule (20 mg) by mouth daily   Quantity:  90 capsule   Refills:  3       sertraline 100 MG tablet   Commonly known as:  ZOLOFT   Used for:  Generalized anxiety disorder        Dose:  100 mg   Take 1 tablet (100 mg) by mouth daily Rx'd by psych   Refills:  0       spironolactone 25 MG tablet   Commonly known as:  ALDACTONE        Dose:  25 mg   Take 1 tablet (25 mg) by mouth daily   Quantity:  90 tablet   Refills:  3       VITAMIN D PO        Dose:  1 tablet   Take 1 tablet by mouth daily   Refills:  0            Where to get your medicines      These medications were sent to [facility] - [address]  [address], [place] [address]     Phone:  [phone]     ciprofloxacin 500 MG tablet    estradiol 0.1 MG/GM cream         Some of these will need a paper prescription and others can be bought over the counter. Ask your nurse if you have questions.     Bring a paper prescription for each of these medications     HYDROcodone-acetaminophen 5-325 MG per tablet                Protect others around you: Learn how to safely use, store and throw away your medicines at www.disposemymeds.org.        ANTIBIOTIC INSTRUCTION     You've Been Prescribed an Antibiotic - Now What?  Your healthcare team thinks that you or your loved one might have an infection. Some infections can be treated with antibiotics, which are powerful, life-saving drugs. Like all medications, antibiotics have side effects and should only be used when necessary. There are some important things  you should know about your antibiotic treatment.      Your healthcare team may run tests before you start taking an antibiotic.    Your team may take samples (e.g., from your blood, urine or other areas) to run tests to look for bacteria. These test can be important to determine if you need an antibiotic at all and, if you do, which antibiotic will work best.      Within a few days, your healthcare team might change or even stop your antibiotic.    Your team may start you on an antibiotic while they are working to find out what is making you sick.    Your team might change your antibiotic because test results show that a different antibiotic would be better to treat your infection.    In some cases, once your team has more information, they learn that you do not need an antibiotic at all. They may find out that you don't have an infection, or that the antibiotic you're taking won't work against your infection. For example, an infection caused by a virus can't be treated with antibiotics. Staying on an antibiotic when you don't need it is more likely to be harmful than helpful.      You may experience side effects from your antibiotic.    Like all medications, antibiotics have side effects. Some of these can be serious.    Let you healthcare team know if you have any known allergies when you are admitted to the hospital.    One significant side effect of nearly all antibiotics is the risk of severe and sometimes deadly diarrhea caused by Clostridium difficile (C. Difficile). This occurs when a person takes antibiotics because some good germs are destroyed. Antibiotic use allows C. diificile to take over, putting patients at high risk for this serious infection.    As a patient or caregiver, it is important to understand your or your loved one's antibiotic treatment. It is especially important for caregivers to speak up when patients can't speak for themselves. Here are some important questions to ask your healthcare  team.    What infection is this antibiotic treating and how do you know I have that infection?    What side effects might occur from this antibiotic?    How long will I need to take this antibiotic?    Is it safe to take this antibiotic with other medications or supplements (e.g., vitamins) that I am taking?     Are there any special directions I need to know about taking this antibiotic? For example, should I take it with food?    How will I be monitored to know whether my infection is responding to the antibiotic?    What tests may help to make sure the right antibiotic is prescribed for me?      Information provided by:  www.cdc.gov/getsmart  U.S. Department of Health and Human Services  Centers for disease Control and Prevention  National Center for Emerging and Zoonotic Infectious Diseases  Division of Healthcare Quality Promotion        Information about OPIOIDS     PRESCRIPTION OPIOIDS: WHAT YOU NEED TO KNOW   We gave you an opioid (narcotic) pain medicine. It is important to manage your pain, but opioids are not always the best choice. You should first try all the other options your care team gave you. Take this medicine for as short a time (and as few doses) as possible.    Some activities can increase your pain, such as bandage changes or therapy sessions. It may help to take your pain medicine 30 to 60 minutes before these activities. Reduce your stress by getting enough sleep, working on hobbies you enjoy and practicing relaxation or meditation. Talk to your care team about ways to manage your pain beyond prescription opioids.    These medicines have risks:    DO NOT drive when on new or higher doses of pain medicine. These medicines can affect your alertness and reaction times, and you could be arrested for driving under the influence (DUI). If you need to use opioids long-term, talk to your care team about driving.    DO NOT operate heavy machinery    DO NOT do any other dangerous activities while  taking these medicines.    DO NOT drink any alcohol while taking these medicines.     If the opioid prescribed includes acetaminophen, DO NOT take with any other medicines that contain acetaminophen. Read all labels carefully. Look for the word  acetaminophen  or  Tylenol.  Ask your pharmacist if you have questions or are unsure.    You can get addicted to pain medicines, especially if you have a history of addiction (chemical, alcohol or substance dependence). Talk to your care team about ways to reduce this risk.    All opioids tend to cause constipation. Drink plenty of water and eat foods that have a lot of fiber, such as fruits, vegetables, prune juice, apple juice and high-fiber cereal. Take a laxative (Miralax, milk of magnesia, Colace, Senna) if you don t move your bowels at least every other day. Other side effects include upset stomach, sleepiness, dizziness, throwing up, tolerance (needing more of the medicine to have the same effect), physical dependence and slowed breathing.    Store your pills in a secure place, locked if possible. We will not replace any lost or stolen medicine. If you don t finish your medicine, please throw away (dispose) as directed by your pharmacist. The [place] Pollution Control Agency has more information about safe disposal: https://www.pca.[facility].[place].us/living-green/managing-unwanted-medications             Medication List: This is a list of all your medications and when to take them. Check marks below indicate your daily home schedule. Keep this list as a reference.      Medications           Morning Afternoon Evening Bedtime As Needed    AMBIEN 5 MG tablet   Take 5 mg by mouth At Bedtime   Last time this was given:  5 mg on 10/31/2018 10:26 PM   Generic drug:  zolpidem                                amLODIPine 10 MG tablet   Commonly known as:  NORVASC   TAKE ONE TABLET BY MOUTH ONCE DAILY IN THE EVENING                                ASPIRIN PO   Take 81 mg by mouth daily                                 atenolol 100 MG tablet   Commonly known as:  TENORMIN   Take 100 mg by mouth daily                                atorvastatin 40 MG tablet   Commonly known as:  LIPITOR   TAKE ONE TABLET BY MOUTH ONCE DAILY AT BEDTIME                                bimatoprost 0.03 % Soln   Commonly known as:  latisse   APPLY ONCE DAILY AT BEDTIME TO UPPER EYELID MARGIN WITH APPLICATOR (NOT LOWER EYELID) AS NEEDED.                                Biotin 1000 MCG Chew   Take 1,000 mcg by mouth daily                                CALCIUM 1200 PO   Take 1 tablet by mouth daily                                ciprofloxacin 500 MG tablet   Commonly known as:  CIPRO   Take 1 tablet (500 mg) by mouth 2 times daily for 5 days                                estradiol 0.1 MG/GM cream   Commonly known as:  ESTRACE VAGINAL   Pea size on a finger to the vagina Q hs                                fluocinonide 0.05 % solution   Commonly known as:  LIDEX   Apply to scalp and top of ears BID for 3-4 weeks. Do not use on face or body folds.                                HYDROcodone-acetaminophen 5-325 MG per tablet   Commonly known as:  NORCO   Take 1 tablet by mouth every 4 hours as needed for pain                                hydrocortisone 0.2 % cream   Commonly known as:  WESTCORT   Apply sparingly to affected area three times daily for 14 days.                                ketoconazole 2 % shampoo   Commonly known as:  NIZORAL   Wash hair daily. Lather and let sit for a few minutes on scalp and ears. Rinse.                                lisinopril 20 MG tablet   Commonly known as:  PRINIVIL/ZESTRIL   TAKE ONE TABLET BY MOUTH TWICE DAILY                                LORazepam 0.5 MG tablet   Commonly known as:  ATIVAN   Take 1 tablet by mouth every 6 hours as needed for anxiety.                                omeprazole 20 MG CR capsule   Commonly known as:  priLOSEC   Take 1 capsule (20 mg) by mouth daily    Last time this was given:  20 mg on 11/1/2018  8:46 AM                                sertraline 100 MG tablet   Commonly known as:  ZOLOFT   Take 1 tablet (100 mg) by mouth daily Rx'd by psych   Last time this was given:  100 mg on 11/1/2018  8:46 AM                                spironolactone 25 MG tablet   Commonly known as:  ALDACTONE   Take 1 tablet (25 mg) by mouth daily                                * triamcinolone 0.1 % cream   Commonly known as:  KENALOG   Apply topically 2 times daily as needed for irritation                                * triamcinolone 0.1 % lotion   Commonly known as:  KENALOG   Apply sparingly once or twice per day as needed to affected area until the skin is better, then stop; REPEAT AS NEEDED                                VITAMIN D PO   Take 1 tablet by mouth daily                                * Notice:  This list has 2 medication(s) that are the same as other medications prescribed for you. Read the directions carefully, and ask your doctor or other care provider to review them with you.

## 2018-10-31 NOTE — PLAN OF CARE
Problem: Patient Care Overview  Goal: Plan of Care/Patient Progress Review  Pt POD#0: A&O x4- VSS on 2L- ex hypotensive. Capno 36 10. No nausea- pain controlled with scheduled toradol and tylenol. Up to bathroom- voided 500 ml red output, bladder scanned for 240. IVF running at 100 ml/hr. Up to unit at 1300-  at bedside.

## 2018-10-31 NOTE — ANESTHESIA PREPROCEDURE EVALUATION
Anesthesia Evaluation     . Pt has had prior anesthetic. Type: General    No history of anesthetic complications          ROS/MED HX    ENT/Pulmonary:     (+)tobacco use, Past use , . .   (-) asthma, COPD and sleep apnea   Neurologic:      (-) seizures, CVA and TIA   Cardiovascular: Comment: Borderline proximal abdominal aortic aneurysm - 3 cm on recent ultrasound    (+) hypertension-Peripheral Vascular Disease-- Other, CAD, -past MI,-stent,2006  1 . : . . . :. .      (-) CHF and arrhythmias   METS/Exercise Tolerance:     Hematologic:        (-) history of blood clots   Musculoskeletal:         GI/Hepatic:     (+) GERD Asymptomatic on medication,      (-) liver disease   Renal/Genitourinary:      (-) renal disease   Endo:      (-) Type I DM and Type II DM   Psychiatric: Comment: History of alcohol abuse - sober x 31 years    (+) psychiatric history anxiety and depression      Infectious Disease:         Malignancy:         Other:                     Physical Exam  Normal systems: dental    Airway   Mallampati: III  TM distance: >3 FB  Neck ROM: full    Dental     Cardiovascular   Rhythm and rate: regular      Pulmonary    breath sounds clear to auscultation                    Anesthesia Plan      History & Physical Review  History and physical reviewed and following examination; no interval change.    ASA Status:  3 .    NPO Status:  > 8 hours    Plan for General and ETT with Intravenous and Propofol induction. Maintenance will be Balanced.    PONV prophylaxis:  Ondansetron (or other 5HT-3) and Dexamethasone or Solumedrol  Additional equipment: 2nd IV and Videolaryngoscope Glidescope as backup to Direct laryngoscope      Postoperative Care  Postoperative pain management:  Multi-modal analgesia.      Consents  Anesthetic plan, risks, benefits and alternatives discussed with:  Patient..        Procedure: Procedure(s):  ROBOTIC ASSISTED XI SACROCOLPOPEXY, CYSTOSCOPY (CEFERINO)  ROBOTIC ASSISTED XI VENTRAL RECTOPEXY  (DENIS)   Preop diagnosis: RECTAL PROLAPSE AND GENITAL PROLAPSE     Allergies   Allergen Reactions     Penicillins Hives     Sulfa Drugs Hives     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     Benign essential hypertension      CAD (coronary artery disease) 2006    [facility]; acute NSTEMI, severe single vessel disease in RCA, PTCA/EUGENIO     VISHAL (generalized anxiety disorder)      History of alcohol abuse     sober since 1987     History of basal cell carcinoma     multiple around right eye     MDD (major depressive disorder)      Microscopic hematuria     multiple, negative work-ups     Past Surgical History:   Procedure Laterality Date     COLONOSCOPY  4/20/2012    Procedure:COLONOSCOPY; COLONOSCOPY; Surgeon:EDSON SHELLEY; Location: GI     HYSTERECTOMY TOTAL ABDOMINAL  1988    for heavy periods     TONSILLECTOMY & ADENOIDECTOMY  1952     Social History   Substance Use Topics     Smoking status: Former Smoker     Packs/day: 2.00     Years: 35.00     Types: Cigarettes     Quit date: 7/1/2006     Smokeless tobacco: Never Used     Alcohol use No      Comment: history of alcohol abuse; sober since 1987     Prior to Admission medications    Medication Sig Start Date End Date Taking? Authorizing Provider   amLODIPine (NORVASC) 10 MG tablet TAKE ONE TABLET BY MOUTH ONCE DAILY IN THE EVENING  Patient taking differently: Take 5 mg by mouth every evening (0.5 x 10 mg = 5 mg dose) 1/12/18  Yes Abundio Burger MD   ASPIRIN PO Take 81 mg by mouth daily   Yes Reported, Patient   atenolol (TENORMIN) 100 MG tablet Take 100 mg by mouth daily 3/22/18  Yes Reported, Patient   atorvastatin (LIPITOR) 40 MG tablet TAKE ONE TABLET BY MOUTH ONCE DAILY AT BEDTIME  Patient taking differently: Take 40 mg by mouth At Bedtime TAKE ONE TABLET BY MOUTH ONCE DAILY AT BEDTIME 1/12/18  Yes Abundio Burger  MD [facility]   bimatoprost (LATISSE) 0.03 % SOLN APPLY ONCE DAILY AT BEDTIME TO UPPER EYELID MARGIN WITH APPLICATOR (NOT LOWER EYELID) AS NEEDED. 2/2/18  Yes Abundio Burger MD   Biotin 1000 MCG CHEW Take 1,000 mcg by mouth daily 1/3/18  Yes Reported, Patient   Calcium Carbonate-Vit D-Min (CALCIUM 1200 PO) Take 1 tablet by mouth daily    Yes Reported, Patient   Cholecalciferol (VITAMIN D PO) Take 1 tablet by mouth daily    Yes Reported, Patient   fluocinonide (LIDEX) 0.05 % solution Apply to scalp and top of ears BID for 3-4 weeks. Do not use on face or body folds.  Patient taking differently: Apply topically 2 times daily as needed Apply to scalp and top of ears  Do not use on face or body folds. 8/8/18  Yes Reanna Duarte PA-C   hydrocortisone (WESTCORT) 0.2 % cream Apply sparingly to affected area three times daily for 14 days.  Patient taking differently: Apply topically 3 times daily as needed Apply sparingly to affected area 5/11/18  Yes Saul Mcdonald PA-C   ketoconazole (NIZORAL) 2 % shampoo Wash hair daily. Lather and let sit for a few minutes on scalp and ears. Rinse.  Patient taking differently: Apply topically daily Wash hair daily. Lather and let sit for a few minutes on scalp and ears. Rinse. 2/26/18  Yes Raina Diego PA-C   lisinopril (PRINIVIL/ZESTRIL) 20 MG tablet TAKE ONE TABLET BY MOUTH TWICE DAILY 9/27/18  Yes Abundio Burger MD   LORazepam (ATIVAN) 0.5 MG tablet Take 1 tablet by mouth every 6 hours as needed for anxiety. 3/14/12  Yes Jean Pierre Guerrero MD   omeprazole (PRILOSEC) 20 MG CR capsule Take 1 capsule (20 mg) by mouth daily 8/14/18  Yes Sydnee Miller MD   sertraline (ZOLOFT) 100 MG tablet Take 1 tablet (100 mg) by mouth daily Rx'd by psych 7/21/16  Yes Abundio Burger MD   spironolactone (ALDACTONE) 25 MG tablet Take 1 tablet (25 mg) by mouth daily 6/12/18  Yes Sydnee Miller MD   triamcinolone (KENALOG) 0.1 % cream Apply topically 2 times  daily as needed for irritation   Yes Reported, Patient   triamcinolone (KENALOG) 0.1 % lotion Apply sparingly once or twice per day as needed to affected area until the skin is better, then stop; REPEAT AS NEEDED  Patient taking differently: Apply topically 2 times daily as needed for irritation Apply sparingly once or twice per day as needed to affected area until the skin is better, then stop; REPEAT AS NEEDED 1/12/18  Yes Abundio Burger MD   zolpidem (AMBIEN) 5 MG tablet Take 5 mg by mouth At Bedtime  1/12/17  Yes Abundio Burger MD     Current Facility-Administered Medications Ordered in Epic   Medication Dose Route Frequency Last Rate Last Dose     acetaminophen (TYLENOL) tablet 975 mg  975 mg Oral Once         ciprofloxacin (CIPRO) infusion 400 mg  400 mg Intravenous Pre-Op/Pre-procedure x 1 dose         ciprofloxacin (CIPRO) infusion 400 mg  400 mg Intravenous See Admin Instructions         lactated ringers infusion   Intravenous Continuous         lidocaine 1 % 1 mL  1 mL Other Q1H PRN         metroNIDAZOLE (FLAGYL) infusion 500 mg  500 mg Intravenous Pre-Op/Pre-procedure x 1 dose         metroNIDAZOLE (FLAGYL) infusion 500 mg  500 mg Intravenous See Admin Instructions         phenazopyridine (PYRIDIUM) tablet 200 mg  200 mg Oral 60 Min Pre-Op         No current [facility]-ordered outpatient prescriptions on file.       lactated ringers       Wt Readings from Last 1 Encounters:   08/14/18 69.3 kg (152 lb 12.8 oz)     Temp Readings from Last 1 Encounters:   08/14/18 37.2  C (98.9  F) (Oral)     BP Readings from Last 6 Encounters:   08/14/18 116/70   06/12/18 124/70   06/01/18 123/77   05/31/18 98/64   05/11/18 102/67   04/17/18 124/60     Pulse Readings from Last 4 Encounters:   08/14/18 64   06/12/18 64   06/01/18 61   05/31/18 68     Resp Readings from Last 1 Encounters:   06/12/18 18     SpO2 Readings from Last 1 Encounters:   08/14/18 100%     Recent Labs   Lab Test  10/31/18   0607   18   0922   WBC  7.7  6.5   HGB  13.1  13.4   PLT  221  214     RECENT LABS:   EC2018 - NSR  ECHO: 2015 - Interpretation Summary     The visual ejection fraction is estimated at 55-60%.  Normal left ventricular wall motion  The right ventricle is normal in size and function.  Right ventricle systolic pressure estimate normal  The rhythm was normal sinus.  Normal chamber sizes.  Compared directly with the study of 2009, the anteroapical wall motion  abnormality on that study has resolved. No hemodynamically significant  valvular abnormalities on 2D or color flow imaging.    Myocardial perfusion scan 2016: 1.  Myocardial perfusion imaging using single isotope technique  demonstrated probable breast attenuation artifact in the setting of  normal wall motion otherwise normal perfusion.   2. Gated images demonstrated normal wall motion and thickening.  The  left ventricular systolic function is 80% at rest and 90% post stress.  3. Compared to the prior study from there is no prior study for  comparison .

## 2018-10-31 NOTE — PROGRESS NOTES
Admission medication history interview status for the 10/31/2018  admission is complete. See EPIC admission navigator for prior to admission medications     Medication history source reliability:Good    Medication history interview source(s):Patient    Medication history resources (including written lists, pill bottles, clinic record):Patient mailed in her medication list prior to surgery    Primary [email or web address]    Additional medication history information not noted on PTA med list :None    Time spent in this activity: 40 minutes    Prior to Admission medications    Medication Sig Last Dose Taking? Auth Provider   amLODIPine (NORVASC) 10 MG tablet TAKE ONE TABLET BY MOUTH ONCE DAILY IN THE EVENING  Patient taking differently: Take 5 mg by mouth every evening (0.5 x 10 mg = 5 mg dose) 10/30/2018 at PM Yes Abundio Burger MD   ASPIRIN PO Take 81 mg by mouth daily 10/24/2018 at AM Yes Reported, Patient   atenolol (TENORMIN) 100 MG tablet Take 100 mg by mouth daily 10/31/2018 at 0345 Yes Reported, Patient   atorvastatin (LIPITOR) 40 MG tablet TAKE ONE TABLET BY MOUTH ONCE DAILY AT BEDTIME  Patient taking differently: Take 40 mg by mouth At Bedtime TAKE ONE TABLET BY MOUTH ONCE DAILY AT BEDTIME 10/30/2018 at HS Yes Abundio Burger MD   bimatoprost (LATISSE) 0.03 % SOLN APPLY ONCE DAILY AT BEDTIME TO UPPER EYELID MARGIN WITH APPLICATOR (NOT LOWER EYELID) AS NEEDED. Past Week at PRN Yes Abundio Burger MD   Biotin 1000 MCG CHEW Take 1,000 mcg by mouth daily 10/24/2018 at AM Yes Reported, Patient   Calcium Carbonate-Vit D-Min (CALCIUM 1200 PO) Take 1 tablet by mouth daily  10/24/2018 at AM Yes Reported, Patient   Cholecalciferol (VITAMIN D PO) Take 1 tablet by mouth daily  10/24/2018 at AM Yes Reported, Patient   fluocinonide (LIDEX) 0.05 % solution Apply to scalp and top of ears BID for 3-4 weeks. Do not use on face or body folds.  Patient taking differently: Apply topically 2 times daily as  needed Apply to scalp and top of ears  Do not use on face or body folds. Past Month at PRN Yes Reanna Duarte PA-C   hydrocortisone (WESTCORT) 0.2 % cream Apply sparingly to affected area three times daily for 14 days.  Patient taking differently: Apply topically 3 times daily as needed Apply sparingly to affected area Past Week at PRN Yes Saul Mcdonald PA-C   ketoconazole (NIZORAL) 2 % shampoo Wash hair daily. Lather and let sit for a few minutes on scalp and ears. Rinse.  Patient taking differently: Apply topically daily Wash hair daily. Lather and let sit for a few minutes on scalp and ears. Rinse. 10/30/2018 at AM Yes Raina Diego PA-C   lisinopril (PRINIVIL/ZESTRIL) 20 MG tablet TAKE ONE TABLET BY MOUTH TWICE DAILY 10/31/2018 at 0345 Yes Abundio Burger MD   LORazepam (ATIVAN) 0.5 MG tablet Take 1 tablet by mouth every 6 hours as needed for anxiety. 10/30/2018 at PM Yes Jean Pierre Guerrero MD   omeprazole (PRILOSEC) 20 MG CR capsule Take 1 capsule (20 mg) by mouth daily 10/31/2018 at 0345 Yes Sydnee Miller MD   sertraline (ZOLOFT) 100 MG tablet Take 1 tablet (100 mg) by mouth daily Rx'd by psych 10/31/2018 at 0345 Yes Abundio Burger MD   spironolactone (ALDACTONE) 25 MG tablet Take 1 tablet (25 mg) by mouth daily 10/31/2018 at 0345 Yes Sydnee Miller MD   triamcinolone (KENALOG) 0.1 % cream Apply topically 2 times daily as needed for irritation Past Week at PRN Yes Reported, Patient   triamcinolone (KENALOG) 0.1 % lotion Apply sparingly once or twice per day as needed to affected area until the skin is better, then stop; REPEAT AS NEEDED  Patient taking differently: Apply topically 2 times daily as needed for irritation Apply sparingly once or twice per day as needed to affected area until the skin is better, then stop; REPEAT AS NEEDED More than a Month at PRN Yes Abundio Burger MD   zolpidem (AMBIEN) 5 MG tablet Take 5 mg by mouth At Bedtime  10/29/2018 at  HS Yes Abundio Burger MD

## 2018-10-31 NOTE — IP AVS SNAPSHOT
[address]    [place] [address]    Phone:  [phone]                                       After Visit Summary   10/31/2018    Liz Haider    MRN: [id number]           After Visit Summary Signature Page     I have received my discharge instructions, and my questions have been answered. I have discussed any challenges I see with this plan with the nurse or doctor.    ..........................................................................................................................................  Patient/Patient Representative Signature      ..........................................................................................................................................  Patient Representative Print Name and Relationship to Patient    ..................................................               ................................................  Date                                   Time    ..........................................................................................................................................  Reviewed by Signature/Title    ...................................................              ..............................................  Date                                               Time          22EPIC Rev 08/18

## 2018-11-01 VITALS
OXYGEN SATURATION: 97 % | SYSTOLIC BLOOD PRESSURE: 98 MMHG | HEIGHT: 61 IN | HEART RATE: 56 BPM | BODY MASS INDEX: 28.13 KG/M2 | WEIGHT: 149 LBS | DIASTOLIC BLOOD PRESSURE: 54 MMHG | RESPIRATION RATE: 16 BRPM | TEMPERATURE: 97.2 F

## 2018-11-01 LAB
ANION GAP SERPL CALCULATED.3IONS-SCNC: 5 MMOL/L (ref 3–14)
BUN SERPL-MCNC: 13 MG/DL (ref 7–30)
CALCIUM SERPL-MCNC: 7.9 MG/DL (ref 8.5–10.1)
CHLORIDE SERPL-SCNC: 100 MMOL/L (ref 94–109)
CO2 SERPL-SCNC: 25 MMOL/L (ref 20–32)
COPATH REPORT: NORMAL
CREAT SERPL-MCNC: 1.02 MG/DL (ref 0.52–1.04)
ERYTHROCYTE [DISTWIDTH] IN BLOOD BY AUTOMATED COUNT: 13.3 % (ref 10–15)
GFR SERPL CREATININE-BSD FRML MDRD: 54 ML/MIN/1.7M2
GLUCOSE SERPL-MCNC: 123 MG/DL (ref 70–99)
HCT VFR BLD AUTO: 31.3 % (ref 35–47)
HGB BLD-MCNC: 10.4 G/DL (ref 11.7–15.7)
MAGNESIUM SERPL-MCNC: 1.7 MG/DL (ref 1.6–2.3)
MCH RBC QN AUTO: 30.5 PG (ref 26.5–33)
MCHC RBC AUTO-ENTMCNC: 33.2 G/DL (ref 31.5–36.5)
MCV RBC AUTO: 92 FL (ref 78–100)
PHOSPHATE SERPL-MCNC: 3.3 MG/DL (ref 2.5–4.5)
PLATELET # BLD AUTO: 158 10E9/L (ref 150–450)
POTASSIUM SERPL-SCNC: 4.6 MMOL/L (ref 3.4–5.3)
RBC # BLD AUTO: 3.41 10E12/L (ref 3.8–5.2)
SODIUM SERPL-SCNC: 130 MMOL/L (ref 133–144)
WBC # BLD AUTO: 9.1 10E9/L (ref 4–11)

## 2018-11-01 PROCEDURE — 25000128 H RX IP 250 OP 636: Performed by: PHYSICIAN ASSISTANT

## 2018-11-01 PROCEDURE — 84100 ASSAY OF PHOSPHORUS: CPT | Performed by: PHYSICIAN ASSISTANT

## 2018-11-01 PROCEDURE — 36415 COLL VENOUS BLD VENIPUNCTURE: CPT | Performed by: PHYSICIAN ASSISTANT

## 2018-11-01 PROCEDURE — 83735 ASSAY OF MAGNESIUM: CPT | Performed by: PHYSICIAN ASSISTANT

## 2018-11-01 PROCEDURE — 80048 BASIC METABOLIC PNL TOTAL CA: CPT | Performed by: PHYSICIAN ASSISTANT

## 2018-11-01 PROCEDURE — 25000132 ZZH RX MED GY IP 250 OP 250 PS 637: Mod: GY | Performed by: PHYSICIAN ASSISTANT

## 2018-11-01 PROCEDURE — 25000128 H RX IP 250 OP 636: Performed by: UROLOGY

## 2018-11-01 PROCEDURE — A9270 NON-COVERED ITEM OR SERVICE: HCPCS | Mod: GY | Performed by: PHYSICIAN ASSISTANT

## 2018-11-01 PROCEDURE — 85027 COMPLETE CBC AUTOMATED: CPT | Performed by: PHYSICIAN ASSISTANT

## 2018-11-01 PROCEDURE — 25800025 ZZH RX 258: Performed by: PHYSICIAN ASSISTANT

## 2018-11-01 RX ORDER — ATENOLOL 25 MG/1
100 TABLET ORAL DAILY
Status: DISCONTINUED | OUTPATIENT
Start: 2018-11-01 | End: 2018-11-01 | Stop reason: HOSPADM

## 2018-11-01 RX ORDER — HYDROCODONE BITARTRATE AND ACETAMINOPHEN 5; 325 MG/1; MG/1
1 TABLET ORAL EVERY 4 HOURS PRN
Qty: 15 TABLET | Refills: 0 | Status: SHIPPED | OUTPATIENT
Start: 2018-11-01 | End: 2019-09-03

## 2018-11-01 RX ADMIN — ENOXAPARIN SODIUM 40 MG: 40 INJECTION SUBCUTANEOUS at 08:46

## 2018-11-01 RX ADMIN — SERTRALINE HYDROCHLORIDE 100 MG: 100 TABLET ORAL at 08:46

## 2018-11-01 RX ADMIN — CIPROFLOXACIN 400 MG: 2 INJECTION, SOLUTION INTRAVENOUS at 06:30

## 2018-11-01 RX ADMIN — POTASSIUM CHLORIDE, DEXTROSE MONOHYDRATE AND SODIUM CHLORIDE: 150; 5; 450 INJECTION, SOLUTION INTRAVENOUS at 00:23

## 2018-11-01 RX ADMIN — KETOROLAC TROMETHAMINE 15 MG: 15 INJECTION, SOLUTION INTRAMUSCULAR; INTRAVENOUS at 06:28

## 2018-11-01 RX ADMIN — KETOROLAC TROMETHAMINE 15 MG: 15 INJECTION, SOLUTION INTRAMUSCULAR; INTRAVENOUS at 01:04

## 2018-11-01 RX ADMIN — OMEPRAZOLE 20 MG: 20 CAPSULE, DELAYED RELEASE ORAL at 08:46

## 2018-11-01 RX ADMIN — ACETAMINOPHEN 975 MG: 325 TABLET, FILM COATED ORAL at 06:27

## 2018-11-01 ASSESSMENT — ACTIVITIES OF DAILY LIVING (ADL)
ADLS_ACUITY_SCORE: 11

## 2018-11-01 NOTE — PLAN OF CARE
Problem: Patient Care Overview  Goal: Plan of Care/Patient Progress Review  Outcome: No Change  Pt A&O x4. POD 0 rectal prolapse. VSS on 1L, wean as able. UP SBA to bathroom. Bright red urine. Lap sites open to air, WDL. IV infusing. NPO + ice. Denies pain. Will continue to monitor.

## 2018-11-01 NOTE — PROGRESS NOTES
[facility]    Urology Progress Note     Assessment & Plan   Liz Haider is a 67 year old female who was admitted on 10/31/2018. POD #1 s/p sacrocolpopexy, rectopexy, mid urethral sling by Dr. Huynh and Dr. Jonas-- doing well     Plan:   -Transition to oral pain control   -discontinue IVF   -ambulate   -discharge home later this AM   -cipro x 7 days   -Estrace cream at bedtime   -f/u with Dr. Jonas in 1 month     SYED ColemanC  [facility]  [address] [place] [address]  [phone]  [email or web address]  Text Page (7am to 5pm)    Interval History   No acute events overnight  Vaginal packing removed yesterday afternoon   Minimal pain   Ambulating well   Tolerating FLD, +flatus and small BM   Voiding well     AVSS  Creat 1.02  Hb 10.4  WBC 9.1    Physical Exam   Temp: 97.2  F (36.2  C) Temp src: Oral BP: 98/54 Pulse: 56 Heart Rate: 59 Resp: 16 SpO2: 97 % O2 Device: None (Room air) Oxygen Delivery: 1 LPM  Vitals:    10/31/18 0600   Weight: 67.6 kg (149 lb)     Vital Signs with Ranges  Temp:  [96  F (35.6  C)-98.5  F (36.9  C)] 97.2  F (36.2  C)  Pulse:  [56-60] 56  Heart Rate:  [54-70] 59  Resp:  [8-28] 16  BP: ()/(50-83) 98/54  SpO2:  [88 %-100 %] 97 %  I/O last 3 completed shifts:  In: 4190 [I.V.:3940]  Out: 1000 [Urine:1000]    Constitutional: Sitting up in bed, NAD  Eyes: no icterus  ENT: normocephalic  Respiratory: breathing unlabored   Cardiovascular: chest wall symmetric   GI: soft, NT, mildly distended; incisions cdi with dermabond, michi  Lymph/Hematologic: no pedal edema or calve tenderness   Skin: well perfused   Musculoskeletal: moves all extremities   Neurologic: no focal deficits   Neuropsychiatric: A&Ox3    Medications       acetaminophen  975 mg Oral Q8H     atenolol  100 mg Oral Daily     ciprofloxacin  400 mg Intravenous Q12H     enoxaparin  40 mg Subcutaneous Q24H     ketorolac  15 mg Intravenous Q6H      omeprazole  20 mg Oral Daily     sertraline  100 mg Oral Daily     sodium chloride (PF)  3 mL Intracatheter Q8H       Data   Results for orders placed or performed during the hospital encounter of 10/31/18 (from the past 24 hour(s))   Basic metabolic panel   Result Value Ref Range    Sodium 130 (L) 133 - 144 mmol/L    Potassium 4.6 3.4 - 5.3 mmol/L    Chloride 100 94 - 109 mmol/L    Carbon Dioxide 25 20 - 32 mmol/L    Anion Gap 5 3 - 14 mmol/L    Glucose 123 (H) 70 - 99 mg/dL    Urea Nitrogen 13 7 - 30 mg/dL    Creatinine 1.02 0.52 - 1.04 mg/dL    GFR Estimate 54 (L) >60 mL/min/1.7m2    GFR Estimate If Black 65 >60 mL/min/1.7m2    Calcium 7.9 (L) 8.5 - 10.1 mg/dL   CBC with platelets   Result Value Ref Range    WBC 9.1 4.0 - 11.0 10e9/L    RBC Count 3.41 (L) 3.8 - 5.2 10e12/L    Hemoglobin 10.4 (L) 11.7 - 15.7 g/dL    Hematocrit 31.3 (L) 35.0 - 47.0 %    MCV 92 78 - 100 fl    MCH 30.5 26.5 - 33.0 pg    MCHC 33.2 31.5 - 36.5 g/dL    RDW 13.3 10.0 - 15.0 %    Platelet Count 158 150 - 450 10e9/L   Magnesium   Result Value Ref Range    Magnesium 1.7 1.6 - 2.3 mg/dL   Phosphorus   Result Value Ref Range    Phosphorus 3.3 2.5 - 4.5 mg/dL

## 2018-11-01 NOTE — PLAN OF CARE
Problem: Patient Care Overview  Goal: Plan of Care/Patient Progress Review  Outcome: Improving  Patient A&O. BP soft but improving. Other VSS on 1 L NC. Denies pain. No flatus. Hypoactive bowel sounds. Incisions CDI with liquid bandage. Up SBA. NPO. Patient reports difficulty sleeping in hospitals.  Will continue to monitor.

## 2018-11-01 NOTE — PROGRESS NOTES
Cipro and Norco prescriptions filled and sent with patient, Estrace cream will be filled at [facility]'s, patient aware. Belongings with patient at discharge. Escorted out via w/c by volunteer, home with spouse.

## 2018-11-01 NOTE — PROGRESS NOTES
[facility]  PROGRESS NOTE    November 1, 2018  Post-op Day # 1    SUBJECTIVE:  The patient is doing well. She has a little nausea. She is passing gas. Her pain is controlled. She is voiding well.     OBJECTIVE:  Temp:  [96  F (35.6  C)-98.5  F (36.9  C)] 97.2  F (36.2  C)  Pulse:  [56-60] 56  Heart Rate:  [54-70] 59  Resp:  [8-28] 16  BP: ()/(50-83) 98/54  SpO2:  [88 %-100 %] 97 %    Intake/Output Summary (Last 24 hours) at 11/01/18 0842  Last data filed at 11/01/18 0651   Gross per 24 hour   Intake             2940 ml   Output             1000 ml   Net             1940 ml       GENERAL:  Awake, alert, no acute distress,   HEAD - Nomocephalic atraumatic  SCLERA anicteric  EXTREMITIES warm and well perfused  ABDOMEN:  Soft, appropriately tender, non-distended,   INCISION:  C/d/i,     LABS:  Lab Results   Component Value Date    WBC 9.1 11/01/2018     Lab Results   Component Value Date    HGB 10.4 11/01/2018     Lab Results   Component Value Date    HCT 31.3 11/01/2018     Lab Results   Component Value Date     11/01/2018     Last Basic Metabolic Panel:  Lab Results   Component Value Date     11/01/2018      Lab Results   Component Value Date    POTASSIUM 4.6 11/01/2018     Lab Results   Component Value Date    CHLORIDE 100 11/01/2018     Lab Results   Component Value Date    TWIN 7.9 11/01/2018     Lab Results   Component Value Date    CO2 25 11/01/2018     Lab Results   Component Value Date    BUN 13 11/01/2018     Lab Results   Component Value Date    CR 1.02 11/01/2018     Lab Results   Component Value Date     11/01/2018       ASSESSMENT/PLAN:POd#1 robotic ventral rectopexy by Dr. lazaro and robotic sacrocolpopexy by Dr. Jonas  1. Full liquids  2. PO and IV pain meds  3. Saline lock IVF  4. OOB, ambulate  5. Lovenox for prophylaxis  6. Dispo: Plan to discharge home today    Christin Ashford PA-C  [facility]  [phone]

## 2018-11-01 NOTE — DISCHARGE SUMMARY
[facility] Discharge Summary      Liz Haider MRN# [id number]   Age: 67 year old YOB: 1950     Date of Admission:  10/31/2018  Date of Discharge::  11/1/2018  Admitting Physician:  Kirsten Huynh MD  Discharge Physician:  Kirsten Huynh MD     PCP:  Sydnee Miller    Disposition: Patient discharged from [facility] to home in stable condition.        Primary Diagnosis:   Rectal prolapse, enterocele, rectocele, urinary incontinence         Discharge Medications:   Current Discharge Medication List      START taking these medications    Details   ciprofloxacin (CIPRO) 500 MG tablet Take 1 tablet (500 mg) by mouth 2 times daily for 5 days  Qty: 10 tablet, Refills: 0    Associated Diagnoses: Microscopic hematuria      estradiol (ESTRACE VAGINAL) 0.1 MG/GM cream Pea size on a finger to the vagina Q hs  Qty: 42.5 g, Refills: 0    Associated Diagnoses: Microscopic hematuria      HYDROcodone-acetaminophen (NORCO) 5-325 MG per tablet Take 1 tablet by mouth every 4 hours as needed for pain  Qty: 15 tablet, Refills: 0    Associated Diagnoses: Female genital prolapse, unspecified type         CONTINUE these medications which have NOT CHANGED    Details   amLODIPine (NORVASC) 10 MG tablet TAKE ONE TABLET BY MOUTH ONCE DAILY IN THE EVENING  Qty: 90 tablet, Refills: 3    Associated Diagnoses: Essential hypertension      ASPIRIN PO Take 81 mg by mouth daily      atenolol (TENORMIN) 100 MG tablet Take 100 mg by mouth daily      atorvastatin (LIPITOR) 40 MG tablet TAKE ONE TABLET BY MOUTH ONCE DAILY AT BEDTIME  Qty: 90 tablet, Refills: 1    Associated Diagnoses: Hyperlipidemia LDL goal <100      bimatoprost (LATISSE) 0.03 % SOLN APPLY ONCE DAILY AT BEDTIME TO UPPER EYELID MARGIN WITH APPLICATOR (NOT LOWER EYELID) AS NEEDED.  Qty: 3 mL, Refills: 1    Comments: Please consider 90 day supplies to promote better adherence  Associated Diagnoses: Hypotrichosis of eyelid, unspecified  laterality      Biotin 1000 MCG CHEW Take 1,000 mcg by mouth daily      Calcium Carbonate-Vit D-Min (CALCIUM 1200 PO) Take 1 tablet by mouth daily     Associated Diagnoses: Routine general medical examination at a health care facility; Unspecified essential hypertension; Other and unspecified hyperlipidemia; Acute myocardial infarction, unspecified site, subsequent episode of care; Sleep disturbance, unspecified; Generalized anxiety disorder; AAA (abdominal aortic aneurysm) (H)      Cholecalciferol (VITAMIN D PO) Take 1 tablet by mouth daily       fluocinonide (LIDEX) 0.05 % solution Apply to scalp and top of ears BID for 3-4 weeks. Do not use on face or body folds.  Qty: 60 mL, Refills: 0    Associated Diagnoses: Dermatitis, seborrheic      hydrocortisone (WESTCORT) 0.2 % cream Apply sparingly to affected area three times daily for 14 days.  Qty: 15 g, Refills: 1    Associated Diagnoses: Rash and nonspecific skin eruption      ketoconazole (NIZORAL) 2 % shampoo Wash hair daily. Lather and let sit for a few minutes on scalp and ears. Rinse.  Qty: 120 mL, Refills: 6    Associated Diagnoses: Dermatitis, seborrheic      lisinopril (PRINIVIL/ZESTRIL) 20 MG tablet TAKE ONE TABLET BY MOUTH TWICE DAILY  Qty: 180 tablet, Refills: 0    Associated Diagnoses: Benign essential hypertension      LORazepam (ATIVAN) 0.5 MG tablet Take 1 tablet by mouth every 6 hours as needed for anxiety.  Qty: 90 tablet, Refills: 4    Comments: My prescription ID is:  [id number].  Liz Haider - [id number] [place] [address].  Associated Diagnoses: Sleep disturbance, unspecified; Acute myocardial infarction, unspecified site, subsequent episode of care; Unspecified essential hypertension; Other and unspecified hyperlipidemia; Generalized anxiety disorder; Routine general medical examination at a health care facility; AAA (abdominal aortic aneurysm) (H)      omeprazole (PRILOSEC) 20 MG CR capsule Take 1 capsule (20 mg) by  mouth daily  Qty: 90 capsule, Refills: 3    Associated Diagnoses: Gastroesophageal reflux disease without esophagitis      sertraline (ZOLOFT) 100 MG tablet Take 1 tablet (100 mg) by mouth daily Rx'd by psych    Comments: Prescribed by psychiatrist (Dr. Wiley)  Associated Diagnoses: Generalized anxiety disorder      spironolactone (ALDACTONE) 25 MG tablet Take 1 tablet (25 mg) by mouth daily  Qty: 90 tablet, Refills: 3      triamcinolone (KENALOG) 0.1 % cream Apply topically 2 times daily as needed for irritation      triamcinolone (KENALOG) 0.1 % lotion Apply sparingly once or twice per day as needed to affected area until the skin is better, then stop; REPEAT AS NEEDED  Qty: 60 mL, Refills: 2    Associated Diagnoses: Psoriasis      zolpidem (AMBIEN) 5 MG tablet Take 5 mg by mouth At Bedtime     Comments: Prescribed by psychiatrist (Dr. Wiley)                    Follow Up, Special Instructions:   Discharge diet: Full liquid, advance to regular diet as bowel function returns   Discharge activity: No heavy lfting >20 lbs for 8 weeks.    Discharge follow-up: Follow up with Chyna Ashford and Dr. Huynh as scheduled in 6 weeks   Wound care: Keep wound clean and dry              Procedures:   Procedure(s): Robotic BSO, sacrocolpopexy, cystoscopy and midurethral sling (Dr. Jonas) and ventral rectopexy, repair of rectocele       No other procedures performed during this admission            Consultations:   n/a          Brief Hospital Summary:   Patient is a 67 year old woman whom underwent robotic BSO, sacrocolpopexy, cystoscopy and midurethral sling (Dr. Jonas) and ventral rectopexy, repair of rectocele on 10/31/18 by Dr. Huynh.   There were no immediate complications during this procedure.    Please refer to the full operative summary for details.  The patient's hospital course was unremarkable. At the time of discharge, she was voiding freely, tolerating a full liquid diet, and not requiring any narcotic pain  medication.          Attestation:  I have reviewed today's vital signs, notes, medications, labs and imaging.    Beverly Chery PA-C  [facility]     Kirsten Huynh MD  [facility]  Pager:  [phone]  Phone: [phone]  Fax: [phone]       ADDENDUM:  Length of stay: 1 days  Indicate Y or N for the following:  UTI  No  C diff  No  PNA  No  SSI No  DVT No  PE  No  CVA No  MI No  Enterocutaneous fistula  No  Peripheral nerve injury  No  Abscess (not adjacent to anastomosis)  No  Leak No    Treated with:   Antibiotics N/A   Drain  N/A   Reoperation  N/A  Death within 30 days No  Reintubation  No  Reoperation  No   Procedure     FOR CANCER CASES: n/a    Time spent:  less than 30 minutes spent in counseling and coordination of care for discharge.

## 2018-11-02 ENCOUNTER — TELEPHONE (OUTPATIENT)
Dept: INTERNAL MEDICINE | Facility: CLINIC | Age: 68
End: 2018-11-02

## 2018-11-02 NOTE — TELEPHONE ENCOUNTER
"Hospital/TCU/ED for chronic condition Discharge Protocol    \"Hi, my name is Twyla Pedro, a registered nurse, and I am calling from [facility].  I am calling to follow up and see how things are going for you after your recent emergency visit/hospital/TCU stay.\"    Tell me how you are doing now that you are home?\"  I am doing well thank you.  I am having a little bit of diarrhea but I'm guessing that's from the antibiotic.      Discharge Instructions    \"Let's review your discharge instructions.  What is/are the follow-up recommendations?  Pt. Response: I am following up with Dr. Jonas and the colon and rectal doctor soon.    \"Has an appointment with your primary care provider been scheduled?\"   No (schedule appointment)---patient does not feel need to see PCP at this time.    \"When you see the provider, I would recommend that you bring your medications with you.\"    Medications    \"Tell me what changed about your medicines when you discharged?\"    Changes to chronic meds?    0-1    \"What questions do you have about your medications?\"    None     New diagnoses of heart failure, COPD, diabetes, or MI?    No              Medication reconciliation completed? Yes  Was MTM referral placed (*Make sure to put transitions as reason for referral)?   No    Call Summary    \"What questions or concerns do you have about your recent visit and your follow-up care?\"     none    \"If you have questions or things don't continue to improve, we encourage you contact us through the main clinic number (give number).  Even if the clinic is not open, triage nurses are available 24/7 to help you.     We would like you to know that our clinic has extended hours (provide information).  We also have urgent care (provide details on closest location and hours/contact info)\"      \"Thank you for your time and take care!\"             "

## 2018-11-04 NOTE — OP NOTE
PREOPERATIVE DIAGNOSIS: Pelvic organ prolapse: rectal intussusception, full thickness rectal prolapse, rectocele, vaginal prolapse and symptoms of obstructive defecation and fecal incontinence.   POSTOPERATIVE DIAGNOSIS: Pelvic organ prolapse: rectal intussusception, full thickness rectal prolapse, rectocele, vaginal prolapse and symptoms of obstructive defecation and fecal incontinence.   OPERATION PERFORMED:   1. Robotic ventral rectopexy with exclusion of the small bowel from the pelvis using mesh (Dr. Huynh)  2. Rectocele repair (Dr. Huynh)  3. Robotic sacrocolpopexy (Dr. Jonas)  4. Robotic bilateral salpingo-oophorectomy (Dr. Jonas)  5. Cystoscopy and midurethral sling (Dr. Jonas)    SURGEON: Kirsten Huynh MD   ASSISTANT: YANIQUE Liao   COSURGEON: Turner Jonas MD (assist: Lynette Sargent PA-C)     ANESTHESIA: General.     INDICATION: Liz Haider is a 67 year old female who [facility] due to difficulty with rectal prolapse, obstructive defecation and fecal incontinence. Her symptoms have been progressively becoming worse. Defecography confirmed the presence of internal intussusception that progresses into full-thickness rectal prolapse, with a rectocele and poor vaginal support. She has combined anterior, middle and posterior pelvic compartment prolapse. The risks and benefits of proceeding with a robotic ventral rectopexy and sacrocolpopexy have been discussed with Liz Haider and she would like to proceed.     OPERATIVE FINDINGS:  The uterus is surgically absent. The bilateral fallopian tubes and ovaries were removed by Dr. Jonas.  The patient has a very deep pouch of Milind. A ventral rectopexy and rectocele repair was performed using permanent, light weight prolene, mesh. The Y-shaped mesh was sutured to the levator muscles and anterior rectum, then Dr. Jonas secured the mesh to the vagina apex and sacral promontory. The  mesh was completely reperitonealized. Cystoscopy was performed by Dr. Jonas and was normal. A midurethral sling was placed by Dr. Jonas.     PROCEDURE IN DETAIL: After informed consent was obtained, the patient was brought to the operating room and placed in the supine position on the operating room table. Sequential compression devices were placed on bilateral lower extremities prior to induction, and general anesthesia was induced without difficulty. She was placed in a well-padded dorsal lithotomy position and IV antibiotics were administered. A Pigazzi pink pad was used on the operating room table to prevent her from sliding off the table in steep Trendelenburg position. Her abdomen was sterilely prepped and draped in standard fashion.  A jimenez catheter was placed without difficulty. The Olaf vaginal retractor was placed into the vagina and upward traction was placed on the vagina.   A 8 mm supraumbilical vertical incision was made in the skin with a #11 blade after instillation of 0.5% Marcaine in the skin and subcutaneous tissue for placement of the camera port. A Veress needle was inserted into the abdominal cavity through this incision. After a positive saline drop test, the abdomen was insufflated with CO2 gas to a pressure of 15 mmHg. A 8 mm bladeless robotic trocar was inserted into the abdominal cavity through the supraumbilical port incision. A 8 mm 30-degree robotic scope was inserted into the abdomen, and the abdomen was explored. The operative findings are noted above. Five additional bladeless trocars were inserted into the abdominal cavity in the following locations after instillation of 0.5% Marcaine in the abdominal wall.   1. An 8 mm robotic port placed in the mid clavicular line on the right side of the abdomen at least 8 cm lateral to the robotic camera port.   2. An 8 mm robotic port was placed in the mid clavicular line on the left side of the abdomen at least 8 cm lateral to the  supraumbilical camera port.   3. An 8 mm robotic port was placed 4 cm above the left superior iliac crest and 8 cm lateral from the other robotic port on the left side of the abdomen.   4. A 5 mm AirSeal assistant port was placed on the right side of the abdomen 5 cm lateral and just inferior to the right sided robotic port.   The patient was placed in steep Trendelenburg position. The Concuityi XI robot was docked to the 4 robotic ports. Chyna Ashford remained as the beside assist while I worked at the robot console. The three robotic instruments were inserted into the abdominal cavity under direct visualization. The robotic 30 degree laparoscope was used. The sigmoid colon and small bowel was reflected completely out of the pelvis in order to better view the pelvic floor. The patient has a very deep pouch of Milind, and a lot of laxity in pelvic floor tissues.  Dr. Jonas performed a bilateral salpingo-oophorectomy. Once this was completed, I began dissection for a sacrocolpopexy and ventral rectopexy.   The peritoneum distal to where the inferior mesenteric artery was identified was opened using cautery with robotic scissors in robot arm #1. Eventually, the sacral promontory and the anterior longitudinal ligament was clearly visible and suitable for the mesh to eventually be secured in this location. Two 0-prolene sutures were pre-placed in the anterior longitudinal ligament in preparation of final mesh fixation and the needles were cut off. The peritoneum was then opened in continuation along the right side of the rectum within the avascular plane. Once the peritoneal reflection had been opened along the right side, this was carried down in the avascular plane, but the dissection did not proceed completely posteriorly. The lateral ligaments remained intact. Dr. Jonas took over and raised flaps of peritoneum anteriorly and inferiorly over the vaginal apex.   I performed the deep pelvic dissection within the  rectovaginal septum, working independently at the robot console. I continued to raise the flap inferiorly along the posterior wall of the vagina into the rectovaginal septum. To aid in the dissection the vagina was retracted towards the bladder using a vaginal retracting device.There was a large amount of redundancy and laxity in the tissue in this plane. Dissection proceeded within the rectovaginal septum for approximately 5-6 cm, down to the level of the levator muscles. The levator muscles were exposed on both sides of the rectum.  The dissection was carried down to the pelvic floor to ensure adequate repair of rectal intussusception and rectocele.  The rectal width measured 4 cm at the level of the levator muscles.   The light weight, permanent mesh placed through the 5 mm right upper quadrant port and laid flat within the pelvis, with the distal portion extending over the anterior portion of the rectum at the level of the levator muscles.   In preparation for sewing the mesh into place, a needle  was placed in robotic arm #2 and robotic arm #1. Chyna Ashford, bedside assist, was able to pass 2-0 Ethibond sutures through the 5 mm assistant port, and 2-0 Ethibond suture was then used to secure the mesh to the levators on both sides of the rectum anteriorly. Eight additional 2-0 Vicryl sutures were then used to secure the mesh to the anterior portion of the rectum, progressing proximally on the rectum. These sutures were placed in seromuscular layers of the rectum.  The mesh was secured to the anterior rectal wall at the most distal extent to ensure repair of rectocele.  The mesh was not on tension across the distal rectum. This completed the ventral rectopexy and rectocele repair portion of the mesh placement.  Dr. Jonas then performed a sacrocolpopexy by securing the y-portion of the mesh to the vaginal apex. There was appropriate laxity between the sutures on the anterior rectal wall and the the  sutures to the posterior vagina, without undo tension. Two 2-0 Prolene sutures were used to secure the mesh at the pre-cleared location on the sacral promontory. These stitches were secured to the anterior longitudinal ligament. The mesh sat comfortably within the pelvis. The mesh was completely reperitonealized by closing the peritoneum using 2-0 Vicryl suture.  The fallopian tubes and ovaries were removed from the abdominal cavity.  All robotic instruments were removed from the abdomen and the robot was undocked.  Chyna Ashford closed the robotic port sites with interrupted stitches and dermabond.  Dr. Jonas performed cystoscopy and placed a bladder sling.   The patient tolerated this procedure well, without complications. Estimated blood loss was 5 mL. I was present and scrubbed for my portion of this operation. The patient was returned to the supine position, extubated in the operating room, and brought to the recovery room in stable condition.   JE BARRIOS MD

## 2018-11-06 NOTE — OP NOTE
Procedure Date: 10/31/2018      PREOPERATIVE DIAGNOSES:  Stress incontinence and pelvic organ prolapse.      POSTOPERATIVE DIAGNOSES:  Stress incontinence and pelvic organ prolapse.      PROCEDURE:  Robotically-assisted sacrocolpopexy and midurethral sling placement and cystoscopy.  Robotically-assisted bilateral salpingo-oophorectomy with the specimen of fallopian tubes, bilateral ovaries.      SURGEON:  Turner Jonas MD      ASSISTANT:  Pura Henry PA-C, in conjunction with robotically-assisted rectopexy by Dr. Huynh.      ESTIMATED BLOOD LOSS:  10 mL.      SPECIMENS:  None.        Preoperatively she received antibiotics.      DETAILS OF PROCEDURE:  Liz is a 67-year-old female brought to the operating room, placed in supine position.  Lo catheter was placed.  Peritoneum of the abdominal areas were prepped and draped in the regular fashion.  Dr. Huynh proceeded with the position of all ports.  After that, I started with the bilateral salpingo-oophorectomy.  On the left side, she appears to have a 1 cm fallopian tube cyst which was drained.  The IP ligament on the left side was ligated using bipolar and the fallopian tube was followed up and completely amputated together with the ovary.  On the right side, I proceeded in a similar fashion, dissection of the IP ligament and the fallopian tube and ovaries from both sides were placed in the Endocatch bag.  Ureters appeared to be intact on both sides at the completion of the dissection.  After that, I opened the peritoneum on top of the sacral promontory and positioned 2-0 Prolene sutures through the anterior longitudinal ligaments.  Peritoneum was opened up all the way to the vaginal cuff and dissection was carried over anterior and posterior surfaces of the cuff.        Dr. Huynh took over and dissected over the anterior rectal wall.  We positioned polypropylene type 1 mesh from Coloplast kit called Restorelle.  Dr. Huynh sutured the mesh over  levator muscles using 2-0 Ethibond and 2-0 Vicryl over anterior rectal wall.  I extended the posterior leaf of the graft and sutured it to the posterior vaginal wall using interrupted 2-0 PDS as well as 2-0 Ethibond posteriorly x2.  Anteriorly I also used a combination of 2-0 Ethibond x2 with addition of the 3-0 PDS x4 anteriorly.  Of note, there was a mixup of the suture.  Therefore, initially I sutured the graft using Prolene sutures and at the end, I ended up changing sutures from Prolene to PDS.      Once the graft was attached very nicely to the apex of the vagina, I adjusted tension-free positioning of the graft and sutured the long portion of the Y mesh to the already preplaced sutures over the sacrum.  I also positioned additional 2-0 Prolene suture, tagging the long portion of the graft to the sacrum.  All extra parts of the mesh and sutures were trimmed and the peritoneum was closed on top of the graft using 2-0 Vicryl in a running fashion.  Once that was complete, the specimen was removed completely in the EndoCatch bag through the midline incision and sent for pathology.  All incisional sites were closed using Vicryl, Monocryl and Dermabond to the skin.      I proceeded with the periurethral dissection 1 cm incision was made and space was dissected using Metzenbaum scissors to the pubic rami bilaterally.  After that, I positioned polypropylene type 1 mesh from 3225 films kit called Obtryx Halo going through the obturator foramen using outside-in technique.  I performed cystoscopy that demonstrated efflux of urine coming from both ureteral orifices.  No stitch, no mesh in the bladder or urethra.  I adjusted tension-free positioning of the graft by placing #8 Hegar dilator between the Lo and the urethra and the graft itself.  Anterior vaginal wall was closed using 2-0 Vicryl in a running fashion.  Dermabond was applied to the incisions over tie areas.  Lo catheter was removed.  Dermabond was  applied over tie areas.  The patient was transferred to the recovery area in stable condition.         JUICE DE LA VEGA MD             D: 10/31/2018   T: 2018   MT: MARIBELL      Name:     JAZZ SHEA   MRN:      [id number]        Account:        [id number]   :      1950           Procedure Date: 10/31/2018      Document: [id number]       cc: [facility]

## 2018-12-10 ENCOUNTER — TRANSFERRED RECORDS (OUTPATIENT)
Dept: HEALTH INFORMATION MANAGEMENT | Facility: CLINIC | Age: 68
End: 2018-12-10

## 2018-12-10 ENCOUNTER — OFFICE VISIT (OUTPATIENT)
Dept: INTERNAL MEDICINE | Facility: CLINIC | Age: 68
End: 2018-12-10
Payer: COMMERCIAL

## 2018-12-10 VITALS
WEIGHT: 155.4 LBS | BODY MASS INDEX: 29.36 KG/M2 | HEART RATE: 73 BPM | RESPIRATION RATE: 16 BRPM | OXYGEN SATURATION: 97 % | DIASTOLIC BLOOD PRESSURE: 68 MMHG | SYSTOLIC BLOOD PRESSURE: 128 MMHG

## 2018-12-10 DIAGNOSIS — R31.0 GROSS HEMATURIA: Primary | ICD-10-CM

## 2018-12-10 LAB
ALBUMIN UR-MCNC: NEGATIVE MG/DL
APPEARANCE UR: CLEAR
BILIRUB UR QL STRIP: NEGATIVE
COLOR UR AUTO: YELLOW
GLUCOSE UR STRIP-MCNC: NEGATIVE MG/DL
HGB UR QL STRIP: ABNORMAL
KETONES UR STRIP-MCNC: NEGATIVE MG/DL
LEUKOCYTE ESTERASE UR QL STRIP: ABNORMAL
NITRATE UR QL: NEGATIVE
PH UR STRIP: 6.5 PH (ref 5–7)
RBC #/AREA URNS AUTO: ABNORMAL /HPF
SOURCE: ABNORMAL
SP GR UR STRIP: <=1.005 (ref 1–1.03)
UROBILINOGEN UR STRIP-ACNC: 0.2 EU/DL (ref 0.2–1)
WBC #/AREA URNS AUTO: ABNORMAL /HPF

## 2018-12-10 PROCEDURE — 81001 URINALYSIS AUTO W/SCOPE: CPT | Performed by: INTERNAL MEDICINE

## 2018-12-10 PROCEDURE — 99214 OFFICE O/P EST MOD 30 MIN: CPT | Performed by: INTERNAL MEDICINE

## 2018-12-10 NOTE — PROGRESS NOTES
SUBJECTIVE:                                                      HPI: Liz Haider is a pleasant 67 year old female who presents with blood after urination:    - ongoing for the last week  - most noticeable first thing in the morning  - urinates then notices blood tinged urine on toilet paper after wiping, today was alida blood  - mild associated dysuria    - no frequency or urgency  - no incomplete emptying/urinary retention  - no fevers or chills  - no nausea or vomiting    - no abdominal, pelvic, or flank pain, but has been having some right lower back pain for the last week    PMH significant for microscopic hematuria with history of negative work-ups.     PSH significant for robotic BSO, sacrocolpopexy, mid urethral sling, ventral rectopexy, repair of rectocele, and cystoscopy 10/31/18.     The medication, allergy, and problem lists have been reviewed and updated as appropriate.       OBJECTIVE:                                                      /68   Pulse 73   Resp 16   Wt 70.5 kg (155 lb 6.4 oz)   SpO2 97%   BMI 29.36 kg/m    Constitutional: well-appearing  Psych: normal judgment and insight; normal mood and affect; recent and remote memory intact      ASSESSMENT/PLAN:                                                      (R31.0) Gross hematuria  (primary encounter diagnosis)  Comment: suspect surgery related as we're only 6 weeks out.  Plan:    - UA reflex today; recommendations to follow.    - if negative for infection, will defer next steps to urology.     The instructions on the AVS were discussed and explained to the patient. Patient expressed understanding of instructions.    (Chart documentation was completed, in part, with Hitch voice-recognition software. Even though reviewed, some grammatical, spelling, and word errors may remain.)    Sydnee Miller MD   [address] [address]  T: [phone], F: [phone]

## 2018-12-26 ENCOUNTER — MYC REFILL (OUTPATIENT)
Dept: INTERNAL MEDICINE | Facility: CLINIC | Age: 68
End: 2018-12-26

## 2018-12-26 DIAGNOSIS — I10 BENIGN ESSENTIAL HYPERTENSION: ICD-10-CM

## 2018-12-29 DIAGNOSIS — I10 BENIGN ESSENTIAL HYPERTENSION: ICD-10-CM

## 2018-12-29 NOTE — TELEPHONE ENCOUNTER
"Requested Prescriptions   Pending Prescriptions Disp Refills     lisinopril (PRINIVIL/ZESTRIL) 20 MG tablet [Pharmacy Med Name: LISINOPRIL 20MG     TAB] 180 tablet 0    Last Written Prescription Date:  9/27/2018  Last Fill Quantity: 180,  # refills: 0   Last office visit: 12/10/2018 with prescribing provider:  12/10/2018   Future Office Visit:     Sig: TAKE 1 TABLET BY MOUTH TWICE DAILY    ACE Inhibitors (Including Combos) Protocol Passed - 12/29/2018  8:25 AM       Passed - Blood pressure under 140/90 in past 12 months    BP Readings from Last 3 Encounters:   12/10/18 128/68   11/01/18 98/54   08/14/18 116/70                Passed - Recent (12 mo) or future (30 days) visit within the authorizing provider's specialty    Patient had office visit in the last 12 months or has a visit in the next 30 days with authorizing provider or within the authorizing provider's specialty.  See \"Patient Info\" tab in inbasket, or \"Choose Columns\" in Meds & Orders section of the refill encounter.             Passed - Patient is age 18 or older       Passed - No active pregnancy on record       Passed - Normal serum creatinine on file in past 12 months    Recent Labs   Lab Test 11/01/18  0740   CR 1.02            Passed - Normal serum potassium on file in past 12 months    Recent Labs   Lab Test 11/01/18  0740   POTASSIUM 4.6            Passed - No positive pregnancy test in past 12 months          "

## 2018-12-31 RX ORDER — LISINOPRIL 20 MG/1
20 TABLET ORAL 2 TIMES DAILY
Qty: 180 TABLET | Refills: 2 | Status: SHIPPED | OUTPATIENT
Start: 2018-12-31 | End: 2019-09-03

## 2018-12-31 NOTE — TELEPHONE ENCOUNTER
Prescription approved per [facility] Refill Protocol.    Rosie SONI RN, BSN, PHN

## 2018-12-31 NOTE — TELEPHONE ENCOUNTER
"Requested Prescriptions   Pending Prescriptions Disp Refills     lisinopril (PRINIVIL/ZESTRIL) 20 MG tablet 180 tablet 0     Sig: Take 1 tablet (20 mg) by mouth 2 times daily    ACE Inhibitors (Including Combos) Protocol Passed - 12/26/2018  9:51 PM       Passed - Blood pressure under 140/90 in past 12 months    BP Readings from Last 3 Encounters:   12/10/18 128/68   11/01/18 98/54   08/14/18 116/70                Passed - Recent (12 mo) or future (30 days) visit within the authorizing provider's specialty    Patient had office visit in the last 12 months or has a visit in the next 30 days with authorizing provider or within the authorizing provider's specialty.  See \"Patient Info\" tab in inbasket, or \"Choose Columns\" in Meds & Orders section of the refill encounter.             Passed - Patient is age 18 or older       Passed - No active pregnancy on record       Passed - Normal serum creatinine on file in past 12 months    Recent Labs   Lab Test 11/01/18  0740   CR 1.02            Passed - Normal serum potassium on file in past 12 months    Recent Labs   Lab Test 11/01/18  0740   POTASSIUM 4.6            Passed - No positive pregnancy test in past 12 months        "

## 2019-01-02 RX ORDER — LISINOPRIL 20 MG/1
TABLET ORAL
Qty: 180 TABLET | Refills: 3 | Status: SHIPPED | OUTPATIENT
Start: 2019-01-02 | End: 2019-09-03

## 2019-01-29 ENCOUNTER — OFFICE VISIT (OUTPATIENT)
Dept: INTERNAL MEDICINE | Facility: CLINIC | Age: 69
End: 2019-01-29
Payer: COMMERCIAL

## 2019-01-29 VITALS
RESPIRATION RATE: 18 BRPM | WEIGHT: 154.4 LBS | HEART RATE: 66 BPM | TEMPERATURE: 98.1 F | SYSTOLIC BLOOD PRESSURE: 104 MMHG | BODY MASS INDEX: 29.17 KG/M2 | OXYGEN SATURATION: 98 % | DIASTOLIC BLOOD PRESSURE: 70 MMHG

## 2019-01-29 DIAGNOSIS — J06.9 UPPER RESPIRATORY TRACT INFECTION, UNSPECIFIED TYPE: Primary | ICD-10-CM

## 2019-01-29 DIAGNOSIS — H01.139 ECZEMA OF EYELID, UNSPECIFIED LATERALITY: ICD-10-CM

## 2019-01-29 PROCEDURE — 99214 OFFICE O/P EST MOD 30 MIN: CPT | Performed by: INTERNAL MEDICINE

## 2019-01-29 RX ORDER — AZITHROMYCIN 250 MG/1
TABLET, FILM COATED ORAL
Qty: 6 TABLET | Refills: 0 | Status: SHIPPED | OUTPATIENT
Start: 2019-01-29 | End: 2019-02-03

## 2019-01-29 RX ORDER — PREDNISONE 20 MG/1
40 TABLET ORAL DAILY
Qty: 10 TABLET | Refills: 0 | Status: SHIPPED | OUTPATIENT
Start: 2019-01-29 | End: 2019-02-03

## 2019-01-29 RX ORDER — DESONIDE 0.5 MG/G
CREAM TOPICAL 2 TIMES DAILY
Qty: 15 G | Refills: 1 | Status: SHIPPED | OUTPATIENT
Start: 2019-01-29 | End: 2019-09-03

## 2019-01-29 NOTE — PATIENT INSTRUCTIONS
Prednisone 2 tabs (taken together) daily x 5 days.    Azithromycin - 2 tabs today (taken together) then 1 tab daily x 4 more days.    ---    Desonide cream twice a day to eyelids for no more than 2 weeks at a time.

## 2019-01-29 NOTE — PROGRESS NOTES
SUBJECTIVE:                                                      HPI: Liz Haider is a pleasant 68 year old female who presents with cold symptoms and eyelid rash:    Re: cold symptoms:  - ongoing for the last 5 days  - symptoms include:   - frequent dry cough   - sinus congestion, pressure, and pain   - runny nose and postnasal drip   - sore throat   - increased fatigue  - no fevers or chills  - no shortness of breath or wheezing  - no chest pain    Has been using extra strength Tylenol and NyQuil with minimal improvement in symptoms.    Does not tolerate NSAIDs due to GI upset.    Re: eyelid rash:  - ongoing for several weeks  - bilateral upper eyelids  - very itchy    Has been using moisturizers with minimal improvement in symptoms.    The medication, allergy, and problem lists have been reviewed and updated as appropriate.     OBJECTIVE:                                                      /70   Pulse 66   Temp 98.1  F (36.7  C) (Oral)   Resp 18   Wt 70 kg (154 lb 6.4 oz)   SpO2 98%   BMI 29.17 kg/m    Constitutional: well-appearing  Head, Ears, Nose, and Throat: normocephalic, atraumatic; normal external auditory canal and pinna; tympanic membranes visualized and normal; nasal septum straight; nasal mucosa pink; no oropharyngeal lesions or ulcers  Neck: supple, symmetric, no thyromegaly or lymphadenopathy  Respiratory: normal respiratory effort; clear to auscultation bilaterally  Integumentary: very mild erythema with overlying fine scale bilateral upper eyelids    ASSESSMENT/PLAN:                                                      (J06.9) Upper respiratory tract infection, unspecified type  (primary encounter diagnosis)  Plan:    - prednisone 40 mg daily times 5 days.   - course of azithromycin prescribed.   - rest and stay well-hydrated.   - if symptoms worsen, change, or do not improve, patient to contact MD.     (H01.139) Eczema of eyelid, unspecified laterality  Comment: Bilateral upper  eyelids.  Plan: desonide 0.05% cream twice a day as needed, for no more than 2 weeks at a time.    The instructions on the AVS were discussed and explained to the patient. Patient expressed understanding of instructions.    (Chart documentation was completed, in part, with [employer] voice-recognition software. Even though reviewed, some grammatical, spelling, and word errors may remain.)    Sydnee Miller MD   [address] [address]  T: [phone], F: [phone]

## 2019-03-13 DIAGNOSIS — I10 BENIGN ESSENTIAL HYPERTENSION: ICD-10-CM

## 2019-03-13 RX ORDER — ATENOLOL 100 MG/1
TABLET ORAL
Qty: 90 TABLET | Refills: 2 | Status: SHIPPED | OUTPATIENT
Start: 2019-03-13 | End: 2019-09-03

## 2019-04-03 ENCOUNTER — ANCILLARY PROCEDURE (OUTPATIENT)
Dept: MAMMOGRAPHY | Facility: CLINIC | Age: 69
End: 2019-04-03
Attending: INTERNAL MEDICINE
Payer: COMMERCIAL

## 2019-04-03 DIAGNOSIS — Z12.31 VISIT FOR SCREENING MAMMOGRAM: ICD-10-CM

## 2019-04-03 PROCEDURE — 77067 SCR MAMMO BI INCL CAD: CPT | Mod: TC

## 2019-04-03 PROCEDURE — 77063 BREAST TOMOSYNTHESIS BI: CPT | Mod: TC

## 2019-06-06 DIAGNOSIS — I10 BENIGN ESSENTIAL HYPERTENSION: Primary | ICD-10-CM

## 2019-06-06 RX ORDER — SPIRONOLACTONE 25 MG/1
TABLET ORAL
Qty: 90 TABLET | Refills: 3 | Status: SHIPPED | OUTPATIENT
Start: 2019-06-06 | End: 2019-09-03

## 2019-06-06 NOTE — TELEPHONE ENCOUNTER
"Requested Prescriptions   Pending Prescriptions Disp Refills     spironolactone (ALDACTONE) 25 MG tablet [Pharmacy Med Name: SPIRONOLACTONE 25MG    TAB] 90 tablet 3     Sig: TAKE 1 TABLET BY MOUTH ONCE DAILY       Diuretics (Including Combos) Protocol Failed - 6/6/2019  5:30 AM        Failed - Normal serum sodium on file in past 12 months     Recent Labs   Lab Test 11/01/18  0740   *              Passed - Blood pressure under 140/90 in past 12 months     BP Readings from Last 3 Encounters:   01/29/19 104/70   12/10/18 128/68   11/01/18 98/54                 Passed - Recent (12 mo) or future (30 days) visit within the authorizing provider's specialty     Patient had office visit in the last 12 months or has a visit in the next 30 days with authorizing provider or within the authorizing provider's specialty.  See \"Patient Info\" tab in inbasket, or \"Choose Columns\" in Meds & Orders section of the refill encounter.              Passed - Medication is active on med list        Passed - Patient is age 18 or older        Passed - No active pregancy on record        Passed - Normal serum creatinine on file in past 12 months     Recent Labs   Lab Test 11/01/18  0740   CR 1.02              Passed - Normal serum potassium on file in past 12 months     Recent Labs   Lab Test 11/01/18  0740   POTASSIUM 4.6                    Passed - No positive pregnancy test in past 12 months        Routing refill request to provider for review/approval because:  Labs out of range:  Na        "

## 2019-08-19 ENCOUNTER — MYC REFILL (OUTPATIENT)
Dept: INTERNAL MEDICINE | Facility: CLINIC | Age: 69
End: 2019-08-19

## 2019-08-19 DIAGNOSIS — I10 ESSENTIAL HYPERTENSION: ICD-10-CM

## 2019-08-20 RX ORDER — AMLODIPINE BESYLATE 10 MG/1
TABLET ORAL
Qty: 90 TABLET | Refills: 0 | Status: SHIPPED | OUTPATIENT
Start: 2019-08-20 | End: 2019-09-03

## 2019-08-20 NOTE — TELEPHONE ENCOUNTER
"Requested Prescriptions   Pending Prescriptions Disp Refills     amLODIPine (NORVASC) 10 MG tablet 90 tablet 3     Sig: TAKE ONE TABLET BY MOUTH ONCE DAILY IN THE EVENING       Calcium Channel Blockers Protocol  Passed - 8/20/2019  8:27 AM        Passed - Blood pressure under 140/90 in past 12 months     BP Readings from Last 3 Encounters:   01/29/19 104/70   12/10/18 128/68   11/01/18 98/54                 Passed - Recent (12 mo) or future (30 days) visit within the authorizing provider's specialty     Patient had office visit in the last 12 months or has a visit in the next 30 days with authorizing provider or within the authorizing provider's specialty.  See \"Patient Info\" tab in inbasket, or \"Choose Columns\" in Meds & Orders section of the refill encounter.              Passed - Medication is active on med list        Passed - Patient is age 18 or older        Passed - No active pregnancy on record        Passed - Normal serum creatinine on file in past 12 months     Recent Labs   Lab Test 11/01/18  0740   CR 1.02             Passed - No positive pregnancy test in past 12 months          "

## 2019-09-03 ENCOUNTER — OFFICE VISIT (OUTPATIENT)
Dept: INTERNAL MEDICINE | Facility: CLINIC | Age: 69
End: 2019-09-03
Payer: COMMERCIAL

## 2019-09-03 VITALS
WEIGHT: 157.2 LBS | HEIGHT: 61 IN | RESPIRATION RATE: 16 BRPM | HEART RATE: 63 BPM | OXYGEN SATURATION: 95 % | DIASTOLIC BLOOD PRESSURE: 80 MMHG | BODY MASS INDEX: 29.68 KG/M2 | SYSTOLIC BLOOD PRESSURE: 103 MMHG

## 2019-09-03 DIAGNOSIS — E55.9 VITAMIN D DEFICIENCY: ICD-10-CM

## 2019-09-03 DIAGNOSIS — F33.42 RECURRENT MAJOR DEPRESSIVE DISORDER, IN FULL REMISSION (H): ICD-10-CM

## 2019-09-03 DIAGNOSIS — I77.810 ASCENDING AORTA DILATATION (H): ICD-10-CM

## 2019-09-03 DIAGNOSIS — L65.9 HYPOTRICHOSIS: ICD-10-CM

## 2019-09-03 DIAGNOSIS — Z13.1 SCREENING FOR DIABETES MELLITUS: ICD-10-CM

## 2019-09-03 DIAGNOSIS — Z13.0 SCREENING FOR BLOOD DISEASE: ICD-10-CM

## 2019-09-03 DIAGNOSIS — L21.9 SEBORRHEIC DERMATITIS: ICD-10-CM

## 2019-09-03 DIAGNOSIS — Z00.00 MEDICARE ANNUAL WELLNESS VISIT, SUBSEQUENT: Primary | ICD-10-CM

## 2019-09-03 DIAGNOSIS — K21.9 GASTROESOPHAGEAL REFLUX DISEASE, ESOPHAGITIS PRESENCE NOT SPECIFIED: ICD-10-CM

## 2019-09-03 DIAGNOSIS — I25.10 CORONARY ARTERY DISEASE INVOLVING NATIVE CORONARY ARTERY OF NATIVE HEART WITHOUT ANGINA PECTORIS: ICD-10-CM

## 2019-09-03 DIAGNOSIS — Z87.891 PERSONAL HISTORY OF TOBACCO USE, PRESENTING HAZARDS TO HEALTH: ICD-10-CM

## 2019-09-03 DIAGNOSIS — R21 RASH: ICD-10-CM

## 2019-09-03 DIAGNOSIS — I10 BENIGN ESSENTIAL HYPERTENSION: ICD-10-CM

## 2019-09-03 DIAGNOSIS — N18.30 CKD (CHRONIC KIDNEY DISEASE), STAGE III (H): ICD-10-CM

## 2019-09-03 LAB
ALBUMIN SERPL-MCNC: 4 G/DL (ref 3.4–5)
ALP SERPL-CCNC: 53 U/L (ref 40–150)
ALT SERPL W P-5'-P-CCNC: 26 U/L (ref 0–50)
ANION GAP SERPL CALCULATED.3IONS-SCNC: 2 MMOL/L (ref 3–14)
AST SERPL W P-5'-P-CCNC: 19 U/L (ref 0–45)
BILIRUB SERPL-MCNC: 0.6 MG/DL (ref 0.2–1.3)
BUN SERPL-MCNC: 13 MG/DL (ref 7–30)
CALCIUM SERPL-MCNC: 9.3 MG/DL (ref 8.5–10.1)
CHLORIDE SERPL-SCNC: 103 MMOL/L (ref 94–109)
CHOLEST SERPL-MCNC: 166 MG/DL
CO2 SERPL-SCNC: 30 MMOL/L (ref 20–32)
CREAT SERPL-MCNC: 0.8 MG/DL (ref 0.52–1.04)
ERYTHROCYTE [DISTWIDTH] IN BLOOD BY AUTOMATED COUNT: 13.3 % (ref 10–15)
GFR SERPL CREATININE-BSD FRML MDRD: 74 ML/MIN/{1.73_M2}
GLUCOSE SERPL-MCNC: 108 MG/DL (ref 70–99)
HCT VFR BLD AUTO: 40.2 % (ref 35–47)
HDLC SERPL-MCNC: 71 MG/DL
HGB BLD-MCNC: 13.1 G/DL (ref 11.7–15.7)
LDLC SERPL CALC-MCNC: 81 MG/DL
MCH RBC QN AUTO: 30.9 PG (ref 26.5–33)
MCHC RBC AUTO-ENTMCNC: 32.6 G/DL (ref 31.5–36.5)
MCV RBC AUTO: 95 FL (ref 78–100)
NONHDLC SERPL-MCNC: 95 MG/DL
PLATELET # BLD AUTO: 214 10E9/L (ref 150–450)
POTASSIUM SERPL-SCNC: 4.7 MMOL/L (ref 3.4–5.3)
PROT SERPL-MCNC: 7.3 G/DL (ref 6.8–8.8)
RBC # BLD AUTO: 4.24 10E12/L (ref 3.8–5.2)
SODIUM SERPL-SCNC: 135 MMOL/L (ref 133–144)
TRIGL SERPL-MCNC: 70 MG/DL
WBC # BLD AUTO: 6.9 10E9/L (ref 4–11)

## 2019-09-03 PROCEDURE — 80061 LIPID PANEL: CPT | Performed by: INTERNAL MEDICINE

## 2019-09-03 PROCEDURE — 80053 COMPREHEN METABOLIC PANEL: CPT | Performed by: INTERNAL MEDICINE

## 2019-09-03 PROCEDURE — G0439 PPPS, SUBSEQ VISIT: HCPCS | Performed by: INTERNAL MEDICINE

## 2019-09-03 PROCEDURE — 36415 COLL VENOUS BLD VENIPUNCTURE: CPT | Performed by: INTERNAL MEDICINE

## 2019-09-03 PROCEDURE — 82306 VITAMIN D 25 HYDROXY: CPT | Performed by: INTERNAL MEDICINE

## 2019-09-03 PROCEDURE — 85027 COMPLETE CBC AUTOMATED: CPT | Performed by: INTERNAL MEDICINE

## 2019-09-03 PROCEDURE — 99207 C PAF COMPLETED  NO CHARGE: CPT | Mod: 25 | Performed by: INTERNAL MEDICINE

## 2019-09-03 RX ORDER — BIMATOPROST 3 UG/ML
1 SOLUTION TOPICAL AT BEDTIME
Qty: 3 ML | Refills: 11 | Status: SHIPPED | OUTPATIENT
Start: 2019-09-03 | End: 2020-07-17

## 2019-09-03 RX ORDER — FLUOCINONIDE TOPICAL SOLUTION USP, 0.05% 0.5 MG/ML
SOLUTION TOPICAL
Qty: 60 ML | Refills: 0 | Status: SHIPPED | OUTPATIENT
Start: 2019-09-03 | End: 2021-09-16

## 2019-09-03 RX ORDER — ATORVASTATIN CALCIUM 40 MG/1
40 TABLET, FILM COATED ORAL DAILY
Qty: 90 TABLET | Refills: 3 | Status: SHIPPED | OUTPATIENT
Start: 2019-09-03 | End: 2020-09-10

## 2019-09-03 RX ORDER — TRIAMCINOLONE ACETONIDE 1 MG/G
CREAM TOPICAL 2 TIMES DAILY PRN
Qty: 30 G | Refills: 3 | Status: SHIPPED | OUTPATIENT
Start: 2019-09-03 | End: 2022-05-02

## 2019-09-03 RX ORDER — SPIRONOLACTONE 25 MG/1
25 TABLET ORAL DAILY
Qty: 90 TABLET | Refills: 3 | Status: SHIPPED | OUTPATIENT
Start: 2019-09-03 | End: 2020-09-10

## 2019-09-03 RX ORDER — BIOTIN 1000 MCG
1000 TABLET,CHEWABLE ORAL DAILY
COMMUNITY
Start: 2019-09-03

## 2019-09-03 RX ORDER — ZOLPIDEM TARTRATE 5 MG/1
5 TABLET ORAL AT BEDTIME
Qty: 1 TABLET | Refills: 0 | COMMUNITY
Start: 2019-09-03 | End: 2020-04-07

## 2019-09-03 RX ORDER — ATENOLOL 100 MG/1
100 TABLET ORAL DAILY
Qty: 90 TABLET | Refills: 3 | Status: SHIPPED | OUTPATIENT
Start: 2019-09-03 | End: 2020-09-10

## 2019-09-03 RX ORDER — SERTRALINE HYDROCHLORIDE 100 MG/1
100 TABLET, FILM COATED ORAL DAILY
COMMUNITY
Start: 2019-09-03 | End: 2020-02-03

## 2019-09-03 RX ORDER — AMLODIPINE BESYLATE 10 MG/1
5 TABLET ORAL DAILY
Qty: 45 TABLET | Refills: 3 | Status: SHIPPED | OUTPATIENT
Start: 2019-09-03 | End: 2020-09-10

## 2019-09-03 RX ORDER — ACETAMINOPHEN 500 MG
1 TABLET ORAL DAILY
COMMUNITY
Start: 2019-09-03

## 2019-09-03 RX ORDER — LORAZEPAM 0.5 MG/1
0.5 TABLET ORAL EVERY 6 HOURS PRN
Qty: 1 TABLET | Refills: 0 | COMMUNITY
Start: 2019-09-03 | End: 2020-04-07

## 2019-09-03 RX ORDER — KETOCONAZOLE 20 MG/ML
SHAMPOO TOPICAL
Qty: 120 ML | Refills: 6 | Status: SHIPPED | OUTPATIENT
Start: 2019-09-03 | End: 2021-09-16

## 2019-09-03 RX ORDER — HYDROCORTISONE VALERATE CREAM 2 MG/G
CREAM TOPICAL
Qty: 15 G | Refills: 1 | Status: SHIPPED | OUTPATIENT
Start: 2019-09-03 | End: 2021-10-01

## 2019-09-03 RX ORDER — LISINOPRIL 20 MG/1
20 TABLET ORAL 2 TIMES DAILY
Qty: 180 TABLET | Refills: 3 | Status: SHIPPED | OUTPATIENT
Start: 2019-09-03 | End: 2020-09-10

## 2019-09-03 ASSESSMENT — MIFFLIN-ST. JEOR: SCORE: 1170.43

## 2019-09-03 ASSESSMENT — ACTIVITIES OF DAILY LIVING (ADL): CURRENT_FUNCTION: NO ASSISTANCE NEEDED

## 2019-09-03 NOTE — PROGRESS NOTES
SUBJECTIVE                                                      HPI: Liz Haider is a pleasant 68 year old female who presents for an AWV:    PMH, PSH, FH, SH, medications, allergies, immunizations, preventative health, and HRA reviewed.     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     Benign essential hypertension      CAD (coronary artery disease) 2006    [facility]; acute NSTEMI, severe single vessel disease in RCA, PTCA/EUGENIO     CKD (chronic kidney disease), stage III (H)      VISHLA (generalized anxiety disorder)      History of alcohol abuse     sober since 1987     History of basal cell carcinoma     multiple around right eye     MDD (major depressive disorder)      Microscopic hematuria     multiple, negative work-ups     Past Surgical History:   Procedure Laterality Date     COLONOSCOPY  4/20/2012    Procedure:COLONOSCOPY; COLONOSCOPY; Surgeon:EDSON SHELLEY; Location: GI     DAVINCI RECTOPEXY N/A 10/31/2018    Procedure: ROBOTIC ASSISTED XI VENTRAL RECTOPEXY (DENIS) ;  Surgeon: Kirsten Huynh MD;  Location:  OR     DAVINCI SACROCOLPOPEXY, CYSTOSCOPY, COMBINED N/A 10/31/2018    Procedure: ROBOTIC ASSISTED XI SACROCOLPOPEXY, CYSTOSCOPY (CEFERINO), BILATERAL SALPINGO-OORPHORECTOMY, MID URETHRAL SLING;  Surgeon: Turner Jonas MD;  Location:  OR     DAVINCI SACROCOLPOPEXY, MIDURETHRAL SLING, CYSTOSCOPY  10/31/2018    Procedure: DAVINCI SACROCOLPOPEXY, MIDURETHRAL SLING, CYSTOSCOPY;  Surgeon: Turner Jonas MD;  Location:  OR     DAVINCI SALPINGO-OOPHORECTOMY INCLUDING BILATERAL Bilateral 10/31/2018    Procedure: DAVINCI SALPINGO-OOPHORECTOMY INCLUDING BILATERAL;  Surgeon: Turner Jonas MD;  Location:  OR     HYSTERECTOMY TOTAL ABDOMINAL  1988    for heavy periods     TONSILLECTOMY & ADENOIDECTOMY  1952     Family History   Problem Relation Age  of Onset     Myocardial Infarction Father         later in life     Diabetes Type 2  Sister      Bladder Cancer Sister         smoking history     Dementia Sister         FTD     Aneurysm Sister         AAA     Dementia Mother      Tongue cancer Brother      Cerebrovascular Disease No family hx of      Coronary Artery Disease Early Onset No family hx of      Breast Cancer No family hx of      Ovarian Cancer No family hx of      Colon Cancer No family hx of      Social History     Occupational History     Occupation: Retired - [employer]   Tobacco Use     Smoking status: Former Smoker     Packs/day: 2.00     Years: 35.00     Pack years: 70.00     Types: Cigarettes     Last attempt to quit: 2006     Years since quittin.1     Smokeless tobacco: Never Used   Substance and Sexual Activity     Alcohol use: No     Comment: history of alcohol abuse; sober since      Drug use: No     Sexual activity: Never     Partners: Male   Social History Narrative    .    No kids.    Walks daily, but no formal exercise.       Allergies   Allergen Reactions     Penicillins Hives     Sulfa Drugs Hives     Current Outpatient Medications   Medication Sig     amLODIPine (NORVASC) 10 MG tablet Take 0.5 tablets (5 mg) by mouth daily     aspirin (ASA) 81 MG tablet Take 1 tablet (81 mg) by mouth daily     atenolol (TENORMIN) 100 MG tablet Take 1 tablet (100 mg) by mouth daily     atorvastatin (LIPITOR) 40 MG tablet Take 1 tablet (40 mg) by mouth daily     bimatoprost (LATISSE) 0.03 % external opthalmic solution Apply 1 drop topically At Bedtime     Biotin 1000 MCG CHEW Take 1,000 mcg by mouth daily     Calcium Carbonate-Vit D-Min (CALCIUM 1200) 3244-2889 MG-UNIT CHEW Take 1 chew tab by mouth daily     fluocinonide (LIDEX) 0.05 % external solution Apply to scalp and top of ears BID for 3-4 weeks. Do not use on face or body folds.     hydrocortisone (WESTCORT) 0.2 % external cream Apply sparingly to affected  area three times daily for 14 days.     ketoconazole (NIZORAL) 2 % external shampoo Wash hair daily. Lather and let sit for a few minutes on scalp and ears. Rinse.     lisinopril (PRINIVIL/ZESTRIL) 20 MG tablet Take 1 tablet (20 mg) by mouth 2 times daily     LORazepam (ATIVAN) 0.5 MG tablet Take 1 tablet (0.5 mg) by mouth every 6 hours as needed for anxiety     omeprazole (PRILOSEC) 20 MG DR capsule Take 1 capsule (20 mg) by mouth daily     sertraline (ZOLOFT) 100 MG tablet Take 1 tablet (100 mg) by mouth daily     spironolactone (ALDACTONE) 25 MG tablet Take 1 tablet (25 mg) by mouth daily     triamcinolone (KENALOG) 0.1 % external cream Apply topically 2 times daily as needed for irritation     vitamin D3 (CHOLECALCIFEROL) 1000 units (25 mcg) tablet Take 1 tablet (1,000 Units) by mouth daily     zolpidem (AMBIEN) 5 MG tablet Take 1 tablet (5 mg) by mouth At Bedtime     Immunization History   Administered Date(s) Administered     Flu 65+ Years 09/13/2018     Influenza (H1N1) 01/05/2010     Influenza (High Dose) 3 valent vaccine 09/11/2014, 09/21/2015, 09/07/2016, 08/29/2017, 09/13/2018     Influenza (IIV3) PF 10/29/1997, 11/04/2007, 10/05/2008, 10/01/2012, 09/19/2013     Pneumo Conj 13-V (2010&after) 04/14/2015     Pneumococcal 23 valent 12/04/2007, 04/17/2018     TD (ADULT, 7+) 02/08/2006     TDAP Vaccine (Adacel) 02/09/2016     Zoster vaccine recombinant adjuvanted (SHINGRIX) 05/21/2018, 09/13/2018     Zoster vaccine, live 12/31/2008     PREVENTATIVE HEALTH                                                      BMI: overweight  Breast CA screening: up to date   Cervical CA screening: screening no longer indicated  Colon CA screening: up to date   Lung CA screening: DUE  Dexa: up to date   Screening HCV: completed  Screening HIV: n/a   Screening diabetes: DUE  STD testing: no risk factors present  Alcohol misuse screening: alcohol use reviewed - no intervention indicated at this time  Immunizations: up to date  "    HEALTH RISK ASSESSMENT                                                      In general, how would you rate your overall physical health? good  Outside of work, how many days during the week do you exercise? 6-7 days/week  Outside of work, approximately how many minutes a day do you exercise? 30-45 minutes/day    If you drink alcohol do you typically have >3 drinks per day or >7 drinks per week? n/a  Do you usually eat at least 4 servings of fruit and vegetables a day, include whole grains & fiber and avoid regularly eating high fat or \"junk\" foods? yes     Do you have any problems taking medications regularly? no  Do you have any side effects from medications? ni    Needs assistance with daily activities: no assistance needed    Which of the following safety concerns are present in your home? no concerns identified    Hearing impairment: no    In the past 6 months, have you been bothered by leaking of urine? no    In general, how would you rate your overall mental or emotional health? good    Additional concerns today: no    OBJECTIVE                                                      /80   Pulse 63   Resp 16   Ht 1.549 m (5' 1\")   Wt 71.3 kg (157 lb 3.2 oz)   SpO2 95%   BMI 29.70 kg/m    Constitutional: well-appearing  Respiratory: normal respiratory effort; clear to auscultation bilaterally  Cardiovascular: regular rate and rhythm; no edema  Gastrointestinal: soft, non-tender, non-distended, and bowel sounds present; no organomegaly or masses   Musculoskeletal: normal gait and station  Psych: normal mood and affect    Cognitive impairment noted: no    ASSESSMENT/PLAN                                                       (Z00.00) Medicare annual wellness visit, subsequent  (primary encounter diagnosis)  Comment: PMH, PSH, FH, SH, medications, allergies, immunizations, and preventative health measures reviewed.   Plan: see below for plans.    (C77.988) Personal history of tobacco use, presenting " hazards to health  Plan: CT Chest Lung Cancer Scrn Low Dose wo ordered - patient to schedule.    (I25.10) Coronary artery disease involving native coronary artery of native heart without angina pectoris  Plan:    - fasting lipid profile today.   - refills of atorvastatin provided.    (N18.3) CKD (chronic kidney disease), stage III (H)  (Z13.1) Screening for diabetes mellitus  Plan: fasting CMP today.    (E55.9) Vitamin D deficiency  Plan: vitamin D level today.      (Z13.0) Screening for blood disease  Plan: CBC today.    (I77.810) Ascending aorta dilatation (H)  Plan: echo ordered - patient to schedule.    (I10) Benign essential hypertension  Comment: well-controlled on amlodipine, atenolol, lisinopril, and spironolactone.  Plan: CPM; refills provided.    (K21.9) Gastroesophageal reflux disease, esophagitis presence not specified  Comment: well-controlled on omeprazole.  Plan: CPM; refills provided.    (F33.42) Recurrent major depressive disorder, in full remission (H)  Comment: well-controlled on Zoloft; followed by outside psychiatry.  Plan: CPM.    (L65.9) Hypotrichosis  Plan: refills of bimatoprost (LATISSE) 0.03% external ophthalmic solution provided.          (R21) Rash  Plan: refills of fluocinonide (LIDEX) 0.05% external solution, hydrocortisone (WESTCORT) 0.2% external cream, triamcinolone (KENALOG) 0.1% external cream provided.    (L21.9) Seborrheic dermatitis  Plan: refills of ketoconazole (NIZORAL) 2% external shampoo provided.    The instructions on the AVS were discussed and explained to the patient. Patient expressed understanding of instructions.    (Chart documentation was completed, in part, with [employer] voice-recognition software. Even though reviewed, some grammatical, spelling, and word errors may remain.)    Sydnee Miller MD   [address] [address]  T: [phone], F: [phone]

## 2019-09-04 LAB — DEPRECATED CALCIDIOL+CALCIFEROL SERPL-MC: 79 UG/L (ref 20–75)

## 2019-09-06 ENCOUNTER — HOSPITAL ENCOUNTER (OUTPATIENT)
Dept: CT IMAGING | Facility: CLINIC | Age: 69
End: 2019-09-06
Attending: INTERNAL MEDICINE
Payer: COMMERCIAL

## 2019-09-06 ENCOUNTER — HOSPITAL ENCOUNTER (OUTPATIENT)
Dept: CARDIOLOGY | Facility: CLINIC | Age: 69
Discharge: HOME | End: 2019-09-06
Attending: INTERNAL MEDICINE | Admitting: INTERNAL MEDICINE
Payer: COMMERCIAL

## 2019-09-06 DIAGNOSIS — Z87.891 PERSONAL HISTORY OF TOBACCO USE, PRESENTING HAZARDS TO HEALTH: ICD-10-CM

## 2019-09-06 DIAGNOSIS — I77.810 ASCENDING AORTA DILATATION (H): ICD-10-CM

## 2019-09-06 PROCEDURE — 93306 TTE W/DOPPLER COMPLETE: CPT | Mod: 26 | Performed by: INTERNAL MEDICINE

## 2019-09-06 PROCEDURE — 93306 TTE W/DOPPLER COMPLETE: CPT

## 2019-09-06 PROCEDURE — G0297 LDCT FOR LUNG CA SCREEN: HCPCS

## 2019-09-28 ENCOUNTER — HEALTH MAINTENANCE LETTER (OUTPATIENT)
Age: 69
End: 2019-09-28

## 2020-01-06 ENCOUNTER — MYC MEDICAL ADVICE (OUTPATIENT)
Dept: INTERNAL MEDICINE | Facility: CLINIC | Age: 70
End: 2020-01-06

## 2020-02-03 ENCOUNTER — OFFICE VISIT (OUTPATIENT)
Dept: INTERNAL MEDICINE | Facility: CLINIC | Age: 70
End: 2020-02-03
Payer: COMMERCIAL

## 2020-02-03 VITALS
WEIGHT: 156.8 LBS | BODY MASS INDEX: 29.63 KG/M2 | OXYGEN SATURATION: 98 % | SYSTOLIC BLOOD PRESSURE: 114 MMHG | DIASTOLIC BLOOD PRESSURE: 70 MMHG | RESPIRATION RATE: 16 BRPM | HEART RATE: 66 BPM

## 2020-02-03 DIAGNOSIS — F33.1 MODERATE EPISODE OF RECURRENT MAJOR DEPRESSIVE DISORDER (H): Primary | ICD-10-CM

## 2020-02-03 DIAGNOSIS — F41.1 GAD (GENERALIZED ANXIETY DISORDER): ICD-10-CM

## 2020-02-03 DIAGNOSIS — G47.9 TROUBLE IN SLEEPING: ICD-10-CM

## 2020-02-03 PROCEDURE — 99213 OFFICE O/P EST LOW 20 MIN: CPT | Performed by: INTERNAL MEDICINE

## 2020-02-03 RX ORDER — SERTRALINE HYDROCHLORIDE 100 MG/1
100 TABLET, FILM COATED ORAL DAILY
Qty: 90 TABLET | Refills: 3 | Status: SHIPPED | OUTPATIENT
Start: 2020-02-03 | End: 2020-09-10

## 2020-02-03 ASSESSMENT — ANXIETY QUESTIONNAIRES
GAD7 TOTAL SCORE: 18
1. FEELING NERVOUS, ANXIOUS, OR ON EDGE: NEARLY EVERY DAY
2. NOT BEING ABLE TO STOP OR CONTROL WORRYING: NEARLY EVERY DAY
IF YOU CHECKED OFF ANY PROBLEMS ON THIS QUESTIONNAIRE, HOW DIFFICULT HAVE THESE PROBLEMS MADE IT FOR YOU TO DO YOUR WORK, TAKE CARE OF THINGS AT HOME, OR GET ALONG WITH OTHER PEOPLE: SOMEWHAT DIFFICULT
5. BEING SO RESTLESS THAT IT IS HARD TO SIT STILL: MORE THAN HALF THE DAYS
7. FEELING AFRAID AS IF SOMETHING AWFUL MIGHT HAPPEN: NEARLY EVERY DAY
3. WORRYING TOO MUCH ABOUT DIFFERENT THINGS: NEARLY EVERY DAY
6. BECOMING EASILY ANNOYED OR IRRITABLE: MORE THAN HALF THE DAYS

## 2020-02-03 ASSESSMENT — PATIENT HEALTH QUESTIONNAIRE - PHQ9
5. POOR APPETITE OR OVEREATING: MORE THAN HALF THE DAYS
SUM OF ALL RESPONSES TO PHQ QUESTIONS 1-9: 13

## 2020-02-04 ASSESSMENT — ANXIETY QUESTIONNAIRES: GAD7 TOTAL SCORE: 18

## 2020-02-04 NOTE — PROGRESS NOTES
SUBJECTIVE:                                                      HPI: Liz Haider is a pleasant 69 year old female who presents for follow-up:    Patient's psychiatrist is retiring and she will need to get future refills of her medication with me.    She is on Zoloft 100mg for anxiety and depression. She takes Ativan 0.5mg 1-2x/day as needed. And she takes Ambien 5mg nightly for sleep. She is tolerating all of these well - no adverse side effects. Anxiety and depression are not well-controlled from a PHQ-9 or VISHAL-7 score perspective, but patient is satisfied with her current degree of control. No medication adjustments or additions desired.     The medication, allergy, and problem lists have been reviewed and updated as appropriate.     OBJECTIVE:                                                      /70   Pulse 66   Resp 16   Wt 71.1 kg (156 lb 12.8 oz)   SpO2 98%   BMI 29.63 kg/m    Constitutional: well-appearing  Psych: normal judgment and insight; normal mood and affect; recent and remote memory intact    ASSESSMENT/PLAN:                                                      (F33.1) Moderate episode of recurrent major depressive disorder (H)  (primary encounter diagnosis)  (F41.1) VISHAL (generalized anxiety disorder)  Comment: not well-controlled from a PHQ-9 or VISHAL-7 score perspective, but patient is satisfied with her current degree of control; no medication adjustments or additions desired.   Plan: CPM; refills provided.     (G47.9) Trouble in sleeping  Comment: well-controlled with Ambien.   Plan: CPM.     The instructions on the AVS were discussed and explained to the patient. Patient expressed understanding of instructions.    (Chart documentation was completed, in part, with [employer] voice-recognition software. Even though reviewed, some grammatical, spelling, and word errors may remain.)    Sydnee Miller MD   [place] [address] [address] [place] [address]  [id number]  T: [phone], F: [phone]

## 2020-02-07 ENCOUNTER — OFFICE VISIT (OUTPATIENT)
Dept: DERMATOLOGY | Facility: CLINIC | Age: 70
End: 2020-02-07
Payer: COMMERCIAL

## 2020-02-07 VITALS — DIASTOLIC BLOOD PRESSURE: 77 MMHG | OXYGEN SATURATION: 98 % | HEART RATE: 64 BPM | SYSTOLIC BLOOD PRESSURE: 127 MMHG

## 2020-02-07 DIAGNOSIS — L82.1 SEBORRHEIC KERATOSIS: ICD-10-CM

## 2020-02-07 DIAGNOSIS — D22.9 NEVUS: ICD-10-CM

## 2020-02-07 DIAGNOSIS — L40.9 SCALP PSORIASIS: ICD-10-CM

## 2020-02-07 DIAGNOSIS — D18.01 ANGIOMA OF SKIN: ICD-10-CM

## 2020-02-07 DIAGNOSIS — L57.0 AK (ACTINIC KERATOSIS): Primary | ICD-10-CM

## 2020-02-07 DIAGNOSIS — L81.4 LENTIGO: ICD-10-CM

## 2020-02-07 DIAGNOSIS — Z85.828 HISTORY OF BASAL CELL CARCINOMA (BCC) OF SKIN: ICD-10-CM

## 2020-02-07 PROCEDURE — 17000 DESTRUCT PREMALG LESION: CPT | Performed by: PHYSICIAN ASSISTANT

## 2020-02-07 PROCEDURE — 99214 OFFICE O/P EST MOD 30 MIN: CPT | Mod: 25 | Performed by: PHYSICIAN ASSISTANT

## 2020-02-07 NOTE — LETTER
2/7/2020         RE: Liz Haider  [address]  [facility] [id number]        Dear Colleague,    Thank you for referring your patient, Liz Haider, to the [facility]. Please see a copy of my visit note below.    HPI:   Chief complaints: Liz Haider is a 69 year old female who presents for Full skin cancer screening to rule out skin cancer   Last Skin Exam: n/a      1st Baseline: YES  Personal HX of Skin Cancer: BCC of medial canthus   Personal HX of Malignant Melanoma: none   Family HX of Skin Cancer / Malignant Melanoma: none  Personal HX of Atypical Moles: none  Risk factors: sun exposure, Hx of BCC  New / Changing lesions: yes, spot on nose  Social History: lives in [place]  On review of systems, there are no further skin complaints, patient is feeling otherwise well.  See patient intake sheet.  ROS of the following were done and are negative: Constitutional, Eyes, Ears, Nose,   Mouth, Throat, Cardiovascular, Respiratory, GI, Genitourinary, Musculoskeletal,   Psychiatric, Endocrine, Allergic/Immunologic.    This document serves as a record of the services and decisions personally performed and made by Reanna Duarte, MS, PA-C. It was created on her behalf by Kiara Brady, a trained medical scribe. The creation of this document is based on the provider's statements to the medical scribe.  Kiara Brady 10:57 AM February 7, 2020    PHYSICAL EXAM:   /77   Pulse 64   SpO2 98%   Breastfeeding No   Skin exam performed as follows: Type 2 skin. Mood appropriate  Alert and Oriented X 3. Well developed, well nourished in no distress.  General appearance: Normal  Head including face: Normal  Eyes: conjunctiva and lids: Normal  Mouth: Lips, teeth, gums: Normal  Neck: Normal  Chest-breast/axillae: Normal  Back: Normal  Spleen and liver: Normal  Cardiovascular: Exam of peripheral vascular system by observation for swelling, varicosities, edema:  Normal  Genitalia: groin, buttocks: Normal  Extremities: digits/nails (clubbing): Normal  Eccrine and Apocrine glands: Normal  Right upper extremity: Normal  Left upper extremity: Normal  Right lower extremity: Normal  Left lower extremity: Normal  Skin: Scalp and body hair: See below    Pt deferred exam of breasts, groin, buttocks: No    Other physical findings:  1. Multiple pigmented macules on extremities and trunk  2. Multiple pigmented macules on face, trunk and extremities  3. Multiple vascular papules on trunk, arms and legs  4. Multiple scattered keratotic plaques  5. Pink gritty papule on right superior helix x 1       Except as noted above, no other signs of skin cancer or melanoma.     ASSESSMENT/PLAN:   Benign Full skin cancer screening today.     Patient with history of BCC  Advised on monthly self exams and 1 year  Patient Education: Appropriate brochures given.    1. Multiple benign appearing nevi on arms, legs and trunk. Discussed ABCDEs of melanoma and sunscreen.   2. Multiple lentigos on arms, legs and trunk. Advised benign, no treatment needed.  3. Multiple scattered angiomas. Advised benign, no treatment needed.   4. Seborrheic keratosis on arms, legs and trunk. Advised benign, no treatment needed.  5. Actinic keratosis on right superior helix x 1. As precancerous, cryosurgery performed. Advised on blistering and post-op care. Advised if not resolved in 1-2 months to return for evaluation  6. Scalp psoriasis -  Well controlled on fluocinonide solution and TAC cream.  7. Liz to follow up with Primary Care provider regarding elevated blood pressure.      Follow-up: yearly FSE/PRN sooner    1.) Patient was asked about new and changing moles. YES  2.) Patient received a complete physical skin examination: YES  3.) Patient was counseled to perform a monthly self skin examination: YES  Scribed By: Kiara Brady, Medical Scribe    The information in this document, created by the medical  scribe for me, accurately reflects the services I personally performed and the decisions made by me. I have reviewed and approved this document for accuracy prior to leaving the patient care area.  February 7, 2020 11:03 AM    Reanna Duarte MS, PABandarC        Again, thank you for allowing me to participate in the care of your patient.        Sincerely,        Reanna Duarte PA-C

## 2020-02-07 NOTE — PROGRESS NOTES
HPI:   Chief complaints: Liz Haider is a 69 year old female who presents for Full skin cancer screening to rule out skin cancer   Last Skin Exam: n/a      1st Baseline: YES  Personal HX of Skin Cancer: BCC of medial canthus   Personal HX of Malignant Melanoma: none   Family HX of Skin Cancer / Malignant Melanoma: none  Personal HX of Atypical Moles: none  Risk factors: sun exposure, Hx of BCC  New / Changing lesions: yes, spot on nose  Social History: lives in [place]  On review of systems, there are no further skin complaints, patient is feeling otherwise well.  See patient intake sheet.  ROS of the following were done and are negative: Constitutional, Eyes, Ears, Nose,   Mouth, Throat, Cardiovascular, Respiratory, GI, Genitourinary, Musculoskeletal,   Psychiatric, Endocrine, Allergic/Immunologic.    This document serves as a record of the services and decisions personally performed and made by Reanna Duarte, MS, PA-C. It was created on her behalf by Kiara Brady, a trained medical scribe. The creation of this document is based on the provider's statements to the medical scribe.  Kiara Brady 10:57 AM February 7, 2020    PHYSICAL EXAM:   /77   Pulse 64   SpO2 98%   Breastfeeding No   Skin exam performed as follows: Type 2 skin. Mood appropriate  Alert and Oriented X 3. Well developed, well nourished in no distress.  General appearance: Normal  Head including face: Normal  Eyes: conjunctiva and lids: Normal  Mouth: Lips, teeth, gums: Normal  Neck: Normal  Chest-breast/axillae: Normal  Back: Normal  Spleen and liver: Normal  Cardiovascular: Exam of peripheral vascular system by observation for swelling, varicosities, edema: Normal  Genitalia: groin, buttocks: Normal  Extremities: digits/nails (clubbing): Normal  Eccrine and Apocrine glands: Normal  Right upper extremity: Normal  Left upper extremity: Normal  Right lower extremity: Normal  Left lower extremity:  Normal  Skin: Scalp and body hair: See below    Pt deferred exam of breasts, groin, buttocks: No    Other physical findings:  1. Multiple pigmented macules on extremities and trunk  2. Multiple pigmented macules on face, trunk and extremities  3. Multiple vascular papules on trunk, arms and legs  4. Multiple scattered keratotic plaques  5. Pink gritty papule on right superior helix x 1       Except as noted above, no other signs of skin cancer or melanoma.     ASSESSMENT/PLAN:   Benign Full skin cancer screening today.     Patient with history of BCC  Advised on monthly self exams and 1 year  Patient Education: Appropriate brochures given.    1. Multiple benign appearing nevi on arms, legs and trunk. Discussed ABCDEs of melanoma and sunscreen.   2. Multiple lentigos on arms, legs and trunk. Advised benign, no treatment needed.  3. Multiple scattered angiomas. Advised benign, no treatment needed.   4. Seborrheic keratosis on arms, legs and trunk. Advised benign, no treatment needed.  5. Actinic keratosis on right superior helix x 1. As precancerous, cryosurgery performed. Advised on blistering and post-op care. Advised if not resolved in 1-2 months to return for evaluation  6. Scalp psoriasis -  Well controlled on fluocinonide solution and TAC cream.  7. Liz to follow up with Primary Care provider regarding elevated blood pressure.      Follow-up: yearly FSE/PRN sooner    1.) Patient was asked about new and changing moles. YES  2.) Patient received a complete physical skin examination: YES  3.) Patient was counseled to perform a monthly self skin examination: YES  Scribed By: Kiara Brady, Medical Scribe    The information in this document, created by the medical scribe for me, accurately reflects the services I personally performed and the decisions made by me. I have reviewed and approved this document for accuracy prior to leaving the patient care area.  February 7, 2020 11:03 AM    Reanna Duarte  MS, SELENE

## 2020-06-10 ENCOUNTER — ANCILLARY PROCEDURE (OUTPATIENT)
Dept: MAMMOGRAPHY | Facility: CLINIC | Age: 70
End: 2020-06-10
Attending: INTERNAL MEDICINE
Payer: COMMERCIAL

## 2020-06-10 DIAGNOSIS — Z12.31 VISIT FOR SCREENING MAMMOGRAM: ICD-10-CM

## 2020-06-10 PROCEDURE — 77063 BREAST TOMOSYNTHESIS BI: CPT | Mod: TC

## 2020-06-10 PROCEDURE — 77067 SCR MAMMO BI INCL CAD: CPT | Mod: TC

## 2020-07-17 ENCOUNTER — MYC REFILL (OUTPATIENT)
Dept: INTERNAL MEDICINE | Facility: CLINIC | Age: 70
End: 2020-07-17

## 2020-07-17 DIAGNOSIS — L65.9 HYPOTRICHOSIS: ICD-10-CM

## 2020-07-17 RX ORDER — BIMATOPROST 3 UG/ML
1 SOLUTION TOPICAL AT BEDTIME
Qty: 3 ML | Refills: 11 | Status: SHIPPED | OUTPATIENT
Start: 2020-07-17 | End: 2021-08-26

## 2020-08-12 ENCOUNTER — TELEPHONE (OUTPATIENT)
Dept: INTERNAL MEDICINE | Facility: CLINIC | Age: 70
End: 2020-08-12

## 2020-08-12 NOTE — TELEPHONE ENCOUNTER
Patient Quality Outreach      Summary:    Patient is due/failing the following:   Annual wellness, date due: 9/3/2020    Type of outreach:    Sent [employer] message.    Questions for provider review:    None                                                                                   **Start Working phrase here:**       Patient has the following on her problem list/HM: None                                                Yamileth Parikh MA      Chart routed to me.

## 2020-09-10 ENCOUNTER — OFFICE VISIT (OUTPATIENT)
Dept: INTERNAL MEDICINE | Facility: CLINIC | Age: 70
End: 2020-09-10
Payer: COMMERCIAL

## 2020-09-10 VITALS
OXYGEN SATURATION: 98 % | DIASTOLIC BLOOD PRESSURE: 68 MMHG | BODY MASS INDEX: 29.34 KG/M2 | HEART RATE: 61 BPM | HEIGHT: 61 IN | SYSTOLIC BLOOD PRESSURE: 114 MMHG | WEIGHT: 155.4 LBS

## 2020-09-10 DIAGNOSIS — Z00.00 MEDICARE ANNUAL WELLNESS VISIT, SUBSEQUENT: Primary | ICD-10-CM

## 2020-09-10 DIAGNOSIS — I10 BENIGN ESSENTIAL HYPERTENSION: ICD-10-CM

## 2020-09-10 DIAGNOSIS — F33.1 MODERATE EPISODE OF RECURRENT MAJOR DEPRESSIVE DISORDER (H): ICD-10-CM

## 2020-09-10 DIAGNOSIS — Z13.1 SCREENING FOR DIABETES MELLITUS: ICD-10-CM

## 2020-09-10 DIAGNOSIS — Z87.891 PERSONAL HISTORY OF TOBACCO USE, PRESENTING HAZARDS TO HEALTH: ICD-10-CM

## 2020-09-10 DIAGNOSIS — F41.1 GAD (GENERALIZED ANXIETY DISORDER): ICD-10-CM

## 2020-09-10 DIAGNOSIS — I25.10 CORONARY ARTERY DISEASE INVOLVING NATIVE CORONARY ARTERY OF NATIVE HEART WITHOUT ANGINA PECTORIS: ICD-10-CM

## 2020-09-10 DIAGNOSIS — E55.9 VITAMIN D DEFICIENCY: ICD-10-CM

## 2020-09-10 DIAGNOSIS — K21.9 GASTROESOPHAGEAL REFLUX DISEASE, ESOPHAGITIS PRESENCE NOT SPECIFIED: ICD-10-CM

## 2020-09-10 LAB
ALBUMIN SERPL-MCNC: 3.8 G/DL (ref 3.4–5)
ALP SERPL-CCNC: 50 U/L (ref 40–150)
ALT SERPL W P-5'-P-CCNC: 25 U/L (ref 0–50)
ANION GAP SERPL CALCULATED.3IONS-SCNC: 4 MMOL/L (ref 3–14)
AST SERPL W P-5'-P-CCNC: 14 U/L (ref 0–45)
BILIRUB SERPL-MCNC: 0.5 MG/DL (ref 0.2–1.3)
BUN SERPL-MCNC: 19 MG/DL (ref 7–30)
CALCIUM SERPL-MCNC: 9.3 MG/DL (ref 8.5–10.1)
CHLORIDE SERPL-SCNC: 103 MMOL/L (ref 94–109)
CHOLEST SERPL-MCNC: 162 MG/DL
CO2 SERPL-SCNC: 27 MMOL/L (ref 20–32)
CREAT SERPL-MCNC: 0.92 MG/DL (ref 0.52–1.04)
GFR SERPL CREATININE-BSD FRML MDRD: 63 ML/MIN/{1.73_M2}
GLUCOSE SERPL-MCNC: 100 MG/DL (ref 70–99)
HDLC SERPL-MCNC: 62 MG/DL
LDLC SERPL CALC-MCNC: 85 MG/DL
NONHDLC SERPL-MCNC: 100 MG/DL
POTASSIUM SERPL-SCNC: 4.9 MMOL/L (ref 3.4–5.3)
PROT SERPL-MCNC: 6.8 G/DL (ref 6.8–8.8)
SODIUM SERPL-SCNC: 134 MMOL/L (ref 133–144)
TRIGL SERPL-MCNC: 75 MG/DL

## 2020-09-10 PROCEDURE — 96127 BRIEF EMOTIONAL/BEHAV ASSMT: CPT | Performed by: INTERNAL MEDICINE

## 2020-09-10 PROCEDURE — 82306 VITAMIN D 25 HYDROXY: CPT | Performed by: INTERNAL MEDICINE

## 2020-09-10 PROCEDURE — 99397 PER PM REEVAL EST PAT 65+ YR: CPT | Performed by: INTERNAL MEDICINE

## 2020-09-10 PROCEDURE — 80053 COMPREHEN METABOLIC PANEL: CPT | Performed by: INTERNAL MEDICINE

## 2020-09-10 PROCEDURE — 80061 LIPID PANEL: CPT | Performed by: INTERNAL MEDICINE

## 2020-09-10 PROCEDURE — 36415 COLL VENOUS BLD VENIPUNCTURE: CPT | Performed by: INTERNAL MEDICINE

## 2020-09-10 RX ORDER — SPIRONOLACTONE 25 MG/1
25 TABLET ORAL DAILY
Qty: 90 TABLET | Refills: 3 | Status: SHIPPED | OUTPATIENT
Start: 2020-09-10 | End: 2021-11-16

## 2020-09-10 RX ORDER — ATORVASTATIN CALCIUM 40 MG/1
40 TABLET, FILM COATED ORAL DAILY
Qty: 90 TABLET | Refills: 3 | Status: SHIPPED | OUTPATIENT
Start: 2020-09-10 | End: 2021-10-27

## 2020-09-10 RX ORDER — ATENOLOL 100 MG/1
100 TABLET ORAL DAILY
Qty: 90 TABLET | Refills: 3 | Status: SHIPPED | OUTPATIENT
Start: 2020-09-10 | End: 2021-09-01

## 2020-09-10 RX ORDER — AMLODIPINE BESYLATE 10 MG/1
5 TABLET ORAL DAILY
Qty: 45 TABLET | Refills: 3 | Status: SHIPPED | OUTPATIENT
Start: 2020-09-10 | End: 2021-10-01

## 2020-09-10 RX ORDER — LISINOPRIL 20 MG/1
20 TABLET ORAL 2 TIMES DAILY
Qty: 180 TABLET | Refills: 3 | Status: SHIPPED | OUTPATIENT
Start: 2020-09-10 | End: 2021-10-01

## 2020-09-10 RX ORDER — SERTRALINE HYDROCHLORIDE 100 MG/1
100 TABLET, FILM COATED ORAL DAILY
Qty: 90 TABLET | Refills: 3 | Status: SHIPPED | OUTPATIENT
Start: 2020-09-10 | End: 2021-10-27

## 2020-09-10 ASSESSMENT — ANXIETY QUESTIONNAIRES
7. FEELING AFRAID AS IF SOMETHING AWFUL MIGHT HAPPEN: NEARLY EVERY DAY
3. WORRYING TOO MUCH ABOUT DIFFERENT THINGS: NEARLY EVERY DAY
1. FEELING NERVOUS, ANXIOUS, OR ON EDGE: NEARLY EVERY DAY
6. BECOMING EASILY ANNOYED OR IRRITABLE: NEARLY EVERY DAY
IF YOU CHECKED OFF ANY PROBLEMS ON THIS QUESTIONNAIRE, HOW DIFFICULT HAVE THESE PROBLEMS MADE IT FOR YOU TO DO YOUR WORK, TAKE CARE OF THINGS AT HOME, OR GET ALONG WITH OTHER PEOPLE: SOMEWHAT DIFFICULT
GAD7 TOTAL SCORE: 19
2. NOT BEING ABLE TO STOP OR CONTROL WORRYING: NEARLY EVERY DAY
5. BEING SO RESTLESS THAT IT IS HARD TO SIT STILL: SEVERAL DAYS

## 2020-09-10 ASSESSMENT — PATIENT HEALTH QUESTIONNAIRE - PHQ9
SUM OF ALL RESPONSES TO PHQ QUESTIONS 1-9: 14
5. POOR APPETITE OR OVEREATING: NEARLY EVERY DAY

## 2020-09-10 ASSESSMENT — MIFFLIN-ST. JEOR: SCORE: 1157.27

## 2020-09-10 NOTE — PROGRESS NOTES
SUBJECTIVE                                                      HPI: Liz Haider is a very pleasant 69 year old female who presents for her AWV:    PMH, PSH, FH, SH, medications, allergies, immunizations, preventative health, and health risk assessment reviewed.     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     Benign essential hypertension      CAD (coronary artery disease) 2006    [facility]; acute NSTEMI, severe single vessel disease in RCA, PTCA/EUGENIO     VISHAL (generalized anxiety disorder)      History of alcohol abuse     sober since 1987     History of basal cell carcinoma     multiple around right eye     Impaired fasting glucose      MDD (major depressive disorder)      Microscopic hematuria     multiple, negative work-ups     Osteopenia      Past Surgical History:   Procedure Laterality Date     COLONOSCOPY  4/20/2012    Procedure:COLONOSCOPY; COLONOSCOPY; Surgeon:EDSON SHELLEY; Location: GI     DAVINCI RECTOPEXY N/A 10/31/2018    Procedure: ROBOTIC ASSISTED XI VENTRAL RECTOPEXY (DENIS) ;  Surgeon: Kirsten Huynh MD;  Location:  OR     DAVINCI SACROCOLPOPEXY, CYSTOSCOPY, COMBINED N/A 10/31/2018    Procedure: ROBOTIC ASSISTED XI SACROCOLPOPEXY, CYSTOSCOPY (CEFERINO), BILATERAL SALPINGO-OORPHORECTOMY, MID URETHRAL SLING;  Surgeon: Turner Jonas MD;  Location: [facility] OR     DAVINCI SACROCOLPOPEXY, MIDURETHRAL SLING, CYSTOSCOPY  10/31/2018    Procedure: DAVINCI SACROCOLPOPEXY, MIDURETHRAL SLING, CYSTOSCOPY;  Surgeon: Turner Jonas MD;  Location:  OR     DAVINCI SALPINGO-OOPHORECTOMY INCLUDING BILATERAL Bilateral 10/31/2018    Procedure: DAVINCI SALPINGO-OOPHORECTOMY INCLUDING BILATERAL;  Surgeon: Turner Jonas MD;  Location: [facility] OR     HYSTERECTOMY TOTAL ABDOMINAL  1988    for heavy periods     TONSILLECTOMY & ADENOIDECTOMY  1952     Family History    Problem Relation Age of Onset     Myocardial Infarction Father         later in life     Diabetes Type 2  Sister      Bladder Cancer Sister         smoking history     Dementia Sister         FTD     Aneurysm Sister         AAA     Cirrhosis Sister      Dementia Mother      Tongue cancer Brother      Cerebrovascular Disease No family hx of      Coronary Artery Disease Early Onset No family hx of      Breast Cancer No family hx of      Ovarian Cancer No family hx of      Colon Cancer No family hx of      Social History     Occupational History     Occupation: Retired - [employer]//   Tobacco Use     Smoking status: Former Smoker     Packs/day: 2.00     Years: 35.00     Pack years: 70.00     Types: Cigarettes     Last attempt to quit: 2006     Years since quittin.2     Smokeless tobacco: Never Used   Substance and Sexual Activity     Alcohol use: No     Comment: history of alcohol abuse; sober since      Drug use: No     Sexual activity: Never     Partners: Male   Social History Narrative    .    No kids.    Walks daily, but no formal exercise.       Allergies   Allergen Reactions     Penicillins Hives     Sulfa Drugs Hives     Current Outpatient Medications   Medication Sig     amLODIPine (NORVASC) 10 MG tablet Take 0.5 tablets (5 mg) by mouth daily     aspirin (ASA) 81 MG tablet Take 1 tablet (81 mg) by mouth daily     atenolol (TENORMIN) 100 MG tablet Take 1 tablet (100 mg) by mouth daily     atorvastatin (LIPITOR) 40 MG tablet Take 1 tablet (40 mg) by mouth daily     bimatoprost (LATISSE) 0.03 % external opthalmic solution Apply 1 drop topically At Bedtime     Biotin 1000 MCG CHEW Take 1,000 mcg by mouth daily     Calcium Carbonate-Vit D-Min (CALCIUM 1200) 1766-1286 MG-UNIT CHEW Take 1 chew tab by mouth daily     fluocinonide (LIDEX) 0.05 % external solution Apply to scalp and top of ears BID for 3-4 weeks. Do not use on face or body folds.     hydrocortisone (WESTCORT) 0.2  % external cream Apply sparingly to affected area three times daily for 14 days.     ketoconazole (NIZORAL) 2 % external shampoo Wash hair daily. Lather and let sit for a few minutes on scalp and ears. Rinse.     lisinopril (PRINIVIL/ZESTRIL) 20 MG tablet Take 1 tablet (20 mg) by mouth 2 times daily     LORazepam (ATIVAN) 0.5 MG tablet Take 1 tablet (0.5 mg) by mouth every 6 hours as needed for anxiety     omeprazole (PRILOSEC) 20 MG DR capsule Take 1 capsule (20 mg) by mouth daily     sertraline (ZOLOFT) 100 MG tablet Take 1 tablet (100 mg) by mouth daily     spironolactone (ALDACTONE) 25 MG tablet Take 1 tablet (25 mg) by mouth daily     triamcinolone (KENALOG) 0.1 % external cream Apply topically 2 times daily as needed for irritation     vitamin D3 (CHOLECALCIFEROL) 1000 units (25 mcg) tablet Take 1 tablet (1,000 Units) by mouth daily     zolpidem (AMBIEN) 5 MG tablet Take 1 tablet (5 mg) by mouth At Bedtime     Immunization History   Administered Date(s) Administered     Flu 65+ Years 09/13/2018     Influenza (H1N1) 01/05/2010     Influenza (High Dose) 3 valent vaccine 09/11/2014, 09/21/2015, 09/07/2016, 08/29/2017, 09/13/2018, 09/09/2019     Influenza (IIV3) PF 10/29/1997, 11/04/2007, 10/05/2008, 10/01/2012, 09/19/2013     Influenza Intranasal Vaccine 4 valent 09/04/2020     Pneumo Conj 13-V (2010&after) 04/14/2015     Pneumococcal 23 valent 12/04/2007, 04/17/2018     TD (ADULT, 7+) 02/08/2006     TDAP Vaccine (Adacel) 02/09/2016     Zoster vaccine recombinant adjuvanted (SHINGRIX) 05/21/2018, 09/13/2018     Zoster vaccine, live 12/31/2008     PREVENTATIVE HEALTH                                                      BMI: overweight  Breast CA screening: up to date   Colon CA screening: up to date   Lung CA screening: DUE  Dexa: up to date   Screening HCV: completed  Screening HIV: n/a   Screening diabetes: DUE  Alcohol misuse screening: alcohol use reviewed - no intervention indicated at this  "time  Immunizations: up to date     HEALTH RISK ASSESSMENT                                                      In general, how would you rate your overall physical health? good  Outside of work, how many days during the week do you exercise? 6-7 days/week  Outside of work, approximately how many minutes a day do you exercise? 30-45 minutes/day    If you drink alcohol do you typically have >3 drinks per day or >7 drinks per week? no  Do you usually eat at least 4 servings of fruit and vegetables a day, include whole grains & fiber and avoid regularly eating high fat or \"junk\" foods? no     Do you have any problems taking medications regularly? no  Do you have any side effects from medications? no    Needs assistance with daily activities: no assistance needed    Which of the following safety concerns are present in your home? no concerns identified    Hearing impairment: no hearing concerns    In the past 6 months, have you been bothered by leaking of urine? no    In general, how would you rate your overall mental or emotional health? fair    Additional concerns today: no    OBJECTIVE                                                      /68   Pulse 61   Ht 1.549 m (5' 1\")   Wt 70.5 kg (155 lb 6.4 oz)   SpO2 98%   BMI 29.36 kg/m    Constitutional: well-appearing  Head, Ears, Nose, and Mouth: normocephalic; normal external auditory canal and pinna; tympanic membranes visualized and normal; nasal septum straight; no oropharyngeal lesions or ulcers  Neck: supple, symmetric, no thyromegaly or lymphadenopathy  Respiratory: normal respiratory effort; clear to auscultation bilaterally  Cardiovascular: regular rate and rhythm; no edema  Gastrointestinal: soft, non-tender, non-distended; no organomegaly or masses   Musculoskeletal: normal gait and station  Psych: normal mood and affect    Cognitive impairment noted: no    ASSESSMENT/PLAN                                                       (Z00.00) Medicare annual " wellness visit, subsequent  (primary encounter diagnosis)  Comment: PMH, PSH, FH, SH, medications, allergies, immunizations, and preventative health measures reviewed and updated as appropriate.  Plan: see below for plans.    (I25.10) Coronary artery disease involving native coronary artery of native heart without angina pectoris  (Z13.1) Screening for diabetes mellitus  (E55.9) Vitamin D deficiency  Plan: fasting labs today; refills of atorvastatin provided.    (Z87.891) Personal history of tobacco use, presenting hazards to health  Plan: CT Chest Lung Cancer Scrn Low Dose wo ordered - patient to schedule.    (I10) Benign essential hypertension  Comment: well-controlled on current regimen.  Plan: CPM; refills provided.    (K21.9) Gastroesophageal reflux disease, esophagitis presence not specified  Comment: well-controlled with omeprazole.  Plan: CPM; refills provided.    (F41.1) VISHAL (generalized anxiety disorder)  (F33.1) Moderate episode of recurrent major depressive disorder (H)  Comment: suboptimally controlled with Zoloft, but patient declines medication change or adjustment and counseling.  Plan: CPM; refills provided.    Sydnee Miller MD   [address] [address]  T: [phone], F: [phone]  (Note was completed, in part, with [employer] voice-recognition software. Documentation was reviewed, but some grammatical, spelling, and word errors may remain.)

## 2020-09-11 ASSESSMENT — ANXIETY QUESTIONNAIRES: GAD7 TOTAL SCORE: 19

## 2020-09-14 LAB — DEPRECATED CALCIDIOL+CALCIFEROL SERPL-MC: 72 UG/L (ref 20–75)

## 2020-09-23 ENCOUNTER — HOSPITAL ENCOUNTER (OUTPATIENT)
Dept: CT IMAGING | Facility: CLINIC | Age: 70
Discharge: HOME | End: 2020-09-23
Attending: INTERNAL MEDICINE | Admitting: INTERNAL MEDICINE
Payer: COMMERCIAL

## 2020-09-23 DIAGNOSIS — Z87.891 PERSONAL HISTORY OF TOBACCO USE, PRESENTING HAZARDS TO HEALTH: ICD-10-CM

## 2020-09-23 PROCEDURE — G0297 LDCT FOR LUNG CA SCREEN: HCPCS

## 2020-09-30 ENCOUNTER — MYC REFILL (OUTPATIENT)
Dept: INTERNAL MEDICINE | Facility: CLINIC | Age: 70
End: 2020-09-30

## 2020-09-30 DIAGNOSIS — K21.9 GASTROESOPHAGEAL REFLUX DISEASE, ESOPHAGITIS PRESENCE NOT SPECIFIED: ICD-10-CM

## 2020-10-06 DIAGNOSIS — F41.1 GAD (GENERALIZED ANXIETY DISORDER): ICD-10-CM

## 2020-10-06 RX ORDER — LORAZEPAM 0.5 MG/1
TABLET ORAL
Qty: 90 TABLET | Refills: 0 | Status: SHIPPED | OUTPATIENT
Start: 2020-10-06 | End: 2020-12-30

## 2020-10-06 NOTE — TELEPHONE ENCOUNTER
ativan      Last Written Prescription Date:  4/7/20  Last Fill Quantity: 90,   # refills: 4  Last Office Visit: 9/10/20  Future Office visit:       Routing refill request to provider for review/approval because:  Drug not on the [facility], [facility] or [facility] refill protocol or controlled substance

## 2020-10-23 DIAGNOSIS — G47.9 TROUBLE IN SLEEPING: ICD-10-CM

## 2020-10-23 RX ORDER — ZOLPIDEM TARTRATE 5 MG/1
TABLET ORAL
Qty: 90 TABLET | Refills: 0 | Status: SHIPPED | OUTPATIENT
Start: 2020-10-23 | End: 2021-02-18

## 2020-10-23 NOTE — TELEPHONE ENCOUNTER
Ambien    Routing refill request to provider for review/approval because:  Drug not on the [facility] refill protocol   Last office visit: 9/10/2020   Future Office Visit:

## 2020-11-28 ENCOUNTER — E-VISIT (OUTPATIENT)
Dept: INTERNAL MEDICINE | Facility: CLINIC | Age: 70
End: 2020-11-28
Payer: COMMERCIAL

## 2020-11-28 DIAGNOSIS — Z20.822 SUSPECTED COVID-19 VIRUS INFECTION: ICD-10-CM

## 2020-11-28 DIAGNOSIS — B34.9 ACUTE VIRAL DISEASE: Primary | ICD-10-CM

## 2020-11-28 PROCEDURE — 99421 OL DIG E/M SVC 5-10 MIN: CPT | Performed by: INTERNAL MEDICINE

## 2020-11-29 NOTE — PATIENT INSTRUCTIONS
Dear Liz Haider,    Your symptoms show that you may have coronavirus (COVID-19). This illness can cause fever, cough and trouble breathing. Many people get a mild case and get better on their own. Some people can get very sick.    Will I be tested for COVID-19?  We would like to test you for Covid-19 virus. I have placed orders for this test.   To schedule: go to your [facility] home page and scroll down to the section that says  You have an appointment that needs to be scheduled  and click the large green button that says  Schedule Now  and follow the steps to find the next available openings.    If you are unable to complete these [facility] scheduling steps, please call [phone] to schedule your testing.     When it's time for your COVID test:  Stay at least 6 feet away from others. (If someone will drive you to your test, stay in the backseat, as far away from the  as you can.)  Cover your mouth and nose with a mask, tissue or washcloth.  Go straight to the testing site. Don't make any stops on the way there or back.    Starting now:     Do not go to work. Follow your usual processes for taking time away from work.  o If you receive a negative COVID-19 test result and were NOT exposed to someone with a known positive COVID-19 test, you can return to work once you're free of fever for 24 hours without fever-reducing medication and your symptoms are improving or resolved.  o If you receive a positive COVID-19 test result, return to work should be at least 10 days from symptom onset (20 days if people have immune compromise) and people should be fever-free for 24 hours without medications, and the respiratory symptoms should be improved significantly before returning to work or school  o If you were exposed to someone who has tested positive for COVID-19, you can return to work 14 days after your last contact with the positive individual, provided you do not have symptoms at all during that time.    During  "this time, don't leave the house except for testing or medical care.  o Stay in your own room, even for meals. Use your own bathroom if you can.  o Stay away from others in your home. No hugging, kissing or shaking hands. No visitors.  o Don't go to work, school or anywhere else.    Clean \"high touch\" surfaces often (doorknobs, counters, handles, etc.). Use a household cleaning spray or wipes. You'll find a full list of  on the EPA website: www.epa.gov/pesticide-registration/list-n-disinfectants-use-against-sars-cov-2.    Cover your mouth and nose with a mask, tissue or washcloth to avoid spreading germs.    Wash your hands and face often. Use soap and water.    People in these groups are at risk for severe illness due to COVID-19:  o People 65 years and older  o People who live in a nursing home or long-term care facility  o People with chronic disease (lung, heart, cancer, diabetes, kidney, liver, immunologic)  o People who have a weakened immune system, including those who:  - Are in cancer treatment  - Take medicine that weakens the immune system, such as corticosteroids  - Had a bone marrow or organ transplant  - Have an immune deficiency  - Have poorly controlled HIV or AIDS  - Are obese (body mass index of 40 or higher)  - Smoke regularly      Caregivers should wear gloves while washing dishes, handling laundry and cleaning bedrooms and bathrooms.    Use caution when washing and drying laundry: Don't shake dirty laundry, and use the warmest water setting that you can.    For more tips, go to www.cdc.gov/coronavirus/2019-ncov/downloads/10Things.pdf.    Sign up for i[employer]. We know it's scary to hear that you might have COVID-19. We want to track your symptoms to make sure you're okay over the next 2 weeks. Please look for an email from [employer]--this is a free, online program that we'll use to keep in touch. To sign up, follow the link in the email you will receive. Learn more at " [email or web address]    How can I take care of myself?    Get lots of rest. Drink extra fluids (unless a doctor has told you not to)    Take Tylenol (acetaminophen) for fever or pain. If you have liver or kidney problems, ask your family doctor if it's okay to take Tylenol.  Adults can take either:    650 mg (two 325 mg pills) every 4 to 6 hours, or     1,000 mg (two 500 mg pills) every 8 hours as needed.    Note: Don't take more than 3,000 mg in one day. Acetaminophen is found in many medicines (both prescribed and over-the-counter medicines). Read all labels to be sure you don't take too much.  For children, check the Tylenol bottle for the right dose. The dose is based on the child's age or weight.    If you have other health problems (like cancer, heart failure, an organ transplant or severe kidney disease): Call your specialty clinic if you don't feel better in the next 2 days.    Know when to call 911. Emergency warning signs include:  Trouble breathing or shortness of breath  Pain or pressure in the chest that doesn't go away  Feeling confused like you haven't felt before, or not being able to wake up  Bluish-colored lips or face    Where can I get more information?     [employer] [place] - About COVID-19: [email or web address]  CDC - What to Do If You're Sick: www.cdc.gov/coronavirus/2019-ncov/about/steps-when-sick.html

## 2020-11-30 DIAGNOSIS — B34.9 ACUTE VIRAL DISEASE: ICD-10-CM

## 2020-11-30 PROCEDURE — U0003 INFECTIOUS AGENT DETECTION BY NUCLEIC ACID (DNA OR RNA); SEVERE ACUTE RESPIRATORY SYNDROME CORONAVIRUS 2 (SARS-COV-2) (CORONAVIRUS DISEASE [COVID-19]), AMPLIFIED PROBE TECHNIQUE, MAKING USE OF HIGH THROUGHPUT TECHNOLOGIES AS DESCRIBED BY CMS-2020-01-R: HCPCS | Performed by: INTERNAL MEDICINE

## 2020-12-01 ENCOUNTER — NURSE TRIAGE (OUTPATIENT)
Dept: NURSING | Facility: CLINIC | Age: 70
End: 2020-12-01

## 2020-12-01 LAB
SARS-COV-2 RNA SPEC QL NAA+PROBE: ABNORMAL
SPECIMEN SOURCE: ABNORMAL

## 2020-12-02 NOTE — TELEPHONE ENCOUNTER
"Triage Call: Pt is calling for her covid results.     Coronavirus (COVID-19) Notification    Caller Name (Patient, parent, daughter/son, grandparent, etc)  Liz Haider    Reason for call  Notify of Positive Coronavirus (COVID-19) lab results, assess symptoms,  review  [facility] recommendations    Lab Result    Lab test:  2019-nCoV rRt-PCR or SARS-CoV-2 PCR    Oropharyngeal AND/OR nasopharyngeal swabs is POSITIVE for 2019-nCoV RNA/SARS-COV-2 PCR (COVID-19 virus)    RN Recommendations/Instructions per [facility] Coronavirus COVID-19 recommendations    Brief introduction script  Introduce self then review script:  \"I am calling on behalf of [employer].  We were notified that your Coronavirus test (COVID-19) for was POSITIVE for the virus.  I have some information to relay to you but first I wanted to mention that the [place] Dept of Health will be contacting you shortly [it's possible MD already called Patient] to talk to you more about how you are feeling and other people you have had contact with who might now also have the virus.  Also,  [facility] is Partnering with the [facility] for Covid-19 research, you may be contacted directly by research staff.\"    Assessment (Inquire about Patient's current symptoms)   Assessment   Current Symptoms at time of phone call: (if no symptoms, document No symptoms] No symptoms   Symptoms onset (if applicable) 11/20/2020     If at time of call, Patients symptoms hare worsened, the Patient should contact 911 or have someone drive them to Emergency Dept promptly:      If Patient calling 911, inform 911 personal that you have tested positive for the Coronavirus (COVID-19).  Place mask on and await 911 to arrive.    If Emergency Dept, If possible, please have another adult drive you to the Emergency Dept but you need to wear mask when in contact with other people.      Review information with Patient    Your result was positive. This means you have COVID-19 " (coronavirus).  We have sent you a letter that reviews the information that I'll be reviewing with you now.    How can I protect others?    If you have symptoms: stay home and away from others (self-isolate) until:    You've had no fever--and no medicine that reduces fever--for 1 full day (24 hours). And       Your other symptoms have gotten better. For example, your cough or breathing has improved. And     At least 10 days have passed since your symptoms started. (If you've been told by a doctor that you have a weak immune system, wait 20 days.)     If you don't have symptoms: Stay home and away from others (self-isolate) until at least 10 days have passed since your first positive COVID-19 test. (Date test collected)    During this time:    Stay in your own room, including for meals. Use your own bathroom if you can.    Stay away from others in your home. No hugging, kissing or shaking hands. No visitors.     Don't go to work, school or anywhere else.     Clean  high touch  surfaces often (doorknobs, counters, handles, etc.). Use a household cleaning spray or wipes. You'll find a full list on the EPA website at www.epa.gov/pesticide-registration/list-n-disinfectants-use-against-sars-cov-2.     Cover your mouth and nose with a mask, tissue or other face covering to avoid spreading germs.    Wash your hands and face often with soap and water.    Caregivers in these groups are at risk for severe illness due to COVID-19:  o People 65 years and older  o People who live in a nursing home or long-term care facility  o People with chronic disease (lung, heart, cancer, diabetes, kidney, liver, immunologic)  o People who have a weakened immune system, including those who:  - Are in cancer treatment  - Take medicine that weakens the immune system, such as corticosteroids  - Had a bone marrow or organ transplant  - Have an immune deficiency  - Have poorly controlled HIV or AIDS  - Are obese (body mass index of 40 or  higher)  - Smoke regularly    Caregivers should wear gloves while washing dishes, handling laundry and cleaning bedrooms and bathrooms.    Wash and dry laundry with special caution. Don't shake dirty laundry, and use the warmest water setting you can.    If you have a weakened immune system, ask your doctor about other actions you should take.    For more tips, go to www.cdc.gov/coronavirus/2019-ncov/downloads/10Things.pdf.    You should not go back to work until you meet the guidelines above for ending your home isolation. You don't need to be retested for COVID-19 before going back to work--studies show that you won't spread the virus if it's been at least 10 days since your symptoms started (or 20 days, if you have a weak immune system).    Employers: This document serves as formal notice of your employee's medical guidelines for going back to work. They must meet the above guidelines before going back to work in person.    How can I take care of myself?    1. Get lots of rest. Drink extra fluids (unless a doctor has told you not to).    2. Take Tylenol (acetaminophen) for fever or pain. If you have liver or kidney problems, ask your family doctor if it's okay to take Tylenol.     Take either:     650 mg (two 325 mg pills) every 4 to 6 hours, or     1,000 mg (two 500 mg pills) every 8 hours as needed.     Note: Don't take more than 3,000 mg in one day. Acetaminophen is found in many medicines (both prescribed and over-the-counter medicines). Read all labels to be sure you don't take too much.    For children, check the Tylenol bottle for the right dose (based on their age or weight).    3. If you have other health problems (like cancer, heart failure, an organ transplant or severe kidney disease): Call your specialty clinic if you don't feel better in the next 2 days.    4. Know when to call 911: Emergency warning signs include:    Trouble breathing or shortness of breath    Pain or pressure in the chest that  doesn't go away    Feeling confused like you haven't felt before, or not being able to wake up    Bluish-colored lips or face    5. Sign up for [employer]. We know it's scary to hear that you have COVID-19. We want to track your symptoms to make sure you're okay over the next 2 weeks. Please look for an email from [employer]--this is a free, online program that we'll use to keep in touch. To sign up, follow the link in the email. Learn more at [email or web address].    Where can I get more information?    [facility]: [email or web address]    Coronavirus Basics: www.health.[facility].[place]/diseases/coronavirus/basics.html    What to Do If You're Sick: www.cdc.gov/coronavirus/2019-ncov/about/steps-when-sick.html    Ending Home Isolation: www.cdc.gov/coronavirus/2019-ncov/hcp/disposition-in-home-patients.html     Caring for Someone with COVID-19: www.cdc.gov/coronavirus/2019-ncov/if-you-are-sick/care-for-someone.html     [facility] clinical trials (COVID-19 research studies): clinicalaffairs.[facility]-clinical-trials     A Positive COVID-19 letter will be sent via [employer] or the mail. (Exception, no letters sent to Presurgerical/Preprocedure Patients)    Ciera Campbell RN      Reason for Disposition    Caller requesting lab results    Protocols used: PCP CALL - NO TRIAGE-A-

## 2020-12-28 DIAGNOSIS — F41.1 GAD (GENERALIZED ANXIETY DISORDER): ICD-10-CM

## 2020-12-30 RX ORDER — LORAZEPAM 0.5 MG/1
0.5 TABLET ORAL EVERY 6 HOURS
Qty: 90 TABLET | Refills: 0 | OUTPATIENT
Start: 2020-12-30

## 2020-12-30 NOTE — TELEPHONE ENCOUNTER
Lorazepam    Routing refill request to provider for review/approval because:  Drug not on the [facility] refill protocol

## 2021-01-26 ENCOUNTER — MYC MEDICAL ADVICE (OUTPATIENT)
Dept: INTERNAL MEDICINE | Facility: CLINIC | Age: 71
End: 2021-01-26

## 2021-05-06 ENCOUNTER — E-VISIT (OUTPATIENT)
Dept: INTERNAL MEDICINE | Facility: CLINIC | Age: 71
End: 2021-05-06
Payer: COMMERCIAL

## 2021-05-06 ENCOUNTER — MYC MEDICAL ADVICE (OUTPATIENT)
Dept: INTERNAL MEDICINE | Facility: CLINIC | Age: 71
End: 2021-05-06

## 2021-05-06 DIAGNOSIS — R19.5 LOOSE STOOLS: Primary | ICD-10-CM

## 2021-05-06 PROCEDURE — 99421 OL DIG E/M SVC 5-10 MIN: CPT | Performed by: INTERNAL MEDICINE

## 2021-05-06 NOTE — TELEPHONE ENCOUNTER
Please advise on 5/3 and 5/6 messages. Pt asking to do evisit but should she be seen in person? How urgently?

## 2021-05-27 ENCOUNTER — TRANSFERRED RECORDS (OUTPATIENT)
Dept: HEALTH INFORMATION MANAGEMENT | Facility: CLINIC | Age: 71
End: 2021-05-27

## 2021-06-14 DIAGNOSIS — Z12.31 VISIT FOR SCREENING MAMMOGRAM: ICD-10-CM

## 2021-06-14 PROCEDURE — 77067 SCR MAMMO BI INCL CAD: CPT | Mod: TC | Performed by: RADIOLOGY

## 2021-06-14 PROCEDURE — 77063 BREAST TOMOSYNTHESIS BI: CPT | Mod: TC | Performed by: RADIOLOGY

## 2021-06-18 ENCOUNTER — TRANSFERRED RECORDS (OUTPATIENT)
Dept: HEALTH INFORMATION MANAGEMENT | Facility: CLINIC | Age: 71
End: 2021-06-18

## 2021-06-24 ENCOUNTER — TRANSFERRED RECORDS (OUTPATIENT)
Dept: HEALTH INFORMATION MANAGEMENT | Facility: CLINIC | Age: 71
End: 2021-06-24

## 2021-08-25 DIAGNOSIS — L65.9 HYPOTRICHOSIS: ICD-10-CM

## 2021-08-26 RX ORDER — BIMATOPROST 0.3 MG/ML
SOLUTION/ DROPS OPHTHALMIC
Qty: 3 ML | Refills: 0 | Status: SHIPPED | OUTPATIENT
Start: 2021-08-26 | End: 2022-03-14

## 2021-08-26 NOTE — TELEPHONE ENCOUNTER
Failed protocol.  please route to  team if patient needs an appointment     Liz MORGANRN BSN  [facility]  [phone]

## 2021-09-01 DIAGNOSIS — I25.10 CORONARY ARTERY DISEASE INVOLVING NATIVE CORONARY ARTERY OF NATIVE HEART WITHOUT ANGINA PECTORIS: ICD-10-CM

## 2021-09-01 DIAGNOSIS — I10 BENIGN ESSENTIAL HYPERTENSION: ICD-10-CM

## 2021-09-01 RX ORDER — ATENOLOL 100 MG/1
TABLET ORAL
Qty: 90 TABLET | Refills: 0 | Status: SHIPPED | OUTPATIENT
Start: 2021-09-01 | End: 2021-12-01

## 2021-09-10 ASSESSMENT — ACTIVITIES OF DAILY LIVING (ADL): CURRENT_FUNCTION: NO ASSISTANCE NEEDED

## 2021-09-10 ASSESSMENT — ENCOUNTER SYMPTOMS
ABDOMINAL PAIN: 1
SORE THROAT: 0
FREQUENCY: 0
DIARRHEA: 1
WEAKNESS: 0
PARESTHESIAS: 0
COUGH: 0
EYE PAIN: 0
HEMATURIA: 0
BREAST MASS: 0
DYSURIA: 0
SHORTNESS OF BREATH: 0
NAUSEA: 0
HEMATOCHEZIA: 0
DIZZINESS: 0
PALPITATIONS: 0
CONSTIPATION: 0
MYALGIAS: 1
HEARTBURN: 0
CHILLS: 0
JOINT SWELLING: 0
FEVER: 0
HEADACHES: 0
NERVOUS/ANXIOUS: 1

## 2021-09-10 ASSESSMENT — PATIENT HEALTH QUESTIONNAIRE - PHQ9
SUM OF ALL RESPONSES TO PHQ QUESTIONS 1-9: 10
SUM OF ALL RESPONSES TO PHQ QUESTIONS 1-9: 10
10. IF YOU CHECKED OFF ANY PROBLEMS, HOW DIFFICULT HAVE THESE PROBLEMS MADE IT FOR YOU TO DO YOUR WORK, TAKE CARE OF THINGS AT HOME, OR GET ALONG WITH OTHER PEOPLE: NOT DIFFICULT AT ALL

## 2021-09-16 ENCOUNTER — OFFICE VISIT (OUTPATIENT)
Dept: INTERNAL MEDICINE | Facility: CLINIC | Age: 71
End: 2021-09-16
Payer: COMMERCIAL

## 2021-09-16 VITALS
BODY MASS INDEX: 27.19 KG/M2 | DIASTOLIC BLOOD PRESSURE: 72 MMHG | OXYGEN SATURATION: 98 % | HEART RATE: 71 BPM | SYSTOLIC BLOOD PRESSURE: 122 MMHG | TEMPERATURE: 97.9 F | WEIGHT: 144 LBS | RESPIRATION RATE: 14 BRPM | HEIGHT: 61 IN

## 2021-09-16 DIAGNOSIS — F33.1 MODERATE EPISODE OF RECURRENT MAJOR DEPRESSIVE DISORDER (H): ICD-10-CM

## 2021-09-16 DIAGNOSIS — E55.9 VITAMIN D DEFICIENCY: ICD-10-CM

## 2021-09-16 DIAGNOSIS — Z01.89 PATIENT REQUESTED DIAGNOSTIC TESTING: ICD-10-CM

## 2021-09-16 DIAGNOSIS — Z13.220 SCREENING FOR CHOLESTEROL LEVEL: ICD-10-CM

## 2021-09-16 DIAGNOSIS — F41.1 GAD (GENERALIZED ANXIETY DISORDER): ICD-10-CM

## 2021-09-16 DIAGNOSIS — R73.01 IMPAIRED FASTING GLUCOSE: ICD-10-CM

## 2021-09-16 DIAGNOSIS — Z00.00 MEDICARE ANNUAL WELLNESS VISIT, SUBSEQUENT: Primary | ICD-10-CM

## 2021-09-16 PROBLEM — N81.9 PROLAPSE OF FEMALE PELVIC ORGANS: Status: RESOLVED | Noted: 2018-10-31 | Resolved: 2021-09-16

## 2021-09-16 LAB
ABO/RH(D): NORMAL
SPECIMEN EXPIRATION DATE: NORMAL

## 2021-09-16 PROCEDURE — 99397 PER PM REEVAL EST PAT 65+ YR: CPT | Performed by: INTERNAL MEDICINE

## 2021-09-16 PROCEDURE — 82306 VITAMIN D 25 HYDROXY: CPT | Performed by: INTERNAL MEDICINE

## 2021-09-16 PROCEDURE — 80061 LIPID PANEL: CPT | Performed by: INTERNAL MEDICINE

## 2021-09-16 PROCEDURE — 36415 COLL VENOUS BLD VENIPUNCTURE: CPT | Performed by: INTERNAL MEDICINE

## 2021-09-16 PROCEDURE — 86901 BLOOD TYPING SEROLOGIC RH(D): CPT | Performed by: INTERNAL MEDICINE

## 2021-09-16 PROCEDURE — 86900 BLOOD TYPING SEROLOGIC ABO: CPT | Performed by: INTERNAL MEDICINE

## 2021-09-16 PROCEDURE — 80053 COMPREHEN METABOLIC PANEL: CPT | Performed by: INTERNAL MEDICINE

## 2021-09-16 RX ORDER — LORAZEPAM 0.5 MG/1
0.5 TABLET ORAL DAILY PRN
Qty: 110 TABLET | Refills: 0 | Status: SHIPPED | OUTPATIENT
Start: 2021-09-16 | End: 2022-04-19

## 2021-09-16 ASSESSMENT — MIFFLIN-ST. JEOR: SCORE: 1100.56

## 2021-09-16 ASSESSMENT — PATIENT HEALTH QUESTIONNAIRE - PHQ9: SUM OF ALL RESPONSES TO PHQ QUESTIONS 1-9: 10

## 2021-09-16 NOTE — PROGRESS NOTES
ASSESSMENT/PLAN                                                       (Z00.00) Medicare annual wellness visit, subsequent  (primary encounter diagnosis)  Comment: PMH, PSH, FH, SH, medications, allergies, immunizations, and preventative health measures reviewed and updated as appropriate.  Plan: see below for plans.      (R73.01) Impaired fasting glucose  (Z13.220) Screening for cholesterol level  (E55.9) Vitamin D deficiency  Plan: fasting labs today.     (Z01.89) Patient requested diagnostic testing  Plan: blood type testing requested by patient.     (F41.1) VISHAL (generalized anxiety disorder)  Comment: well-controlled on current regimen.    Plan: continue present management; refills provided.     (F33.1) Moderate episode of recurrent major depressive disorder  Comment: reasonably controlled on current regimen.     Appropriate preventive services were discussed with this patient, including applicable screening as appropriate for cardiovascular disease, diabetes, osteopenia/osteoporosis, and glaucoma.  As appropriate for age/gender, discussed screening for colorectal cancer, prostate cancer, breast cancer, and cervical cancer. Checklist reviewing preventive services available has been given to the patient.    Reviewed patients plan of care. The Basic Care Plan (routine screening as documented in Health Maintenance) for Liz Haider meets the Care Plan requirement. This Care Plan has been established and reviewed with the Patient.    Sydnee Miller MD   [address] [address]  T: [phone], F: [phone]    SUBJECTIVE                                                      Liz Haider is a very pleasant 70 year old female who presents for her subsequent AWV:    Current providers (other than myself): n/a     PMH, PSH, FH, SH, medications, allergies, immunizations, preventative health, and health risk assessment reviewed and updated as appropriate.    Past Medical  History:   Diagnosis Date     Acid reflux disease      Benign essential hypertension      CAD (coronary artery disease) 2006    [facility]; acute NSTEMI, severe single vessel disease in RCA, PTCA/EUGENIO     VISHAL (generalized anxiety disorder)      History of alcohol abuse     sober since 1987     History of basal cell carcinoma     multiple around right eye     Impaired fasting glucose      MDD (major depressive disorder)      Microscopic hematuria     multiple, negative work-ups     Osteopenia      Past Surgical History:   Procedure Laterality Date     COLONOSCOPY  4/20/2012    Procedure:COLONOSCOPY; COLONOSCOPY; Surgeon:EDSON SHELLEY; Location: GI     DAVINCI RECTOPEXY N/A 10/31/2018    Procedure: ROBOTIC ASSISTED XI VENTRAL RECTOPEXY (DENIS) ;  Surgeon: Kirsten Huynh MD;  Location: [facility] OR     DAVINCI SACROCOLPOPEXY, CYSTOSCOPY, COMBINED N/A 10/31/2018    Procedure: ROBOTIC ASSISTED XI SACROCOLPOPEXY, CYSTOSCOPY (CEFERINO), BILATERAL SALPINGO-OORPHORECTOMY, MID URETHRAL SLING;  Surgeon: Turner Jonas MD;  Location: [facility] OR     DAVINCI SACROCOLPOPEXY, MIDURETHRAL SLING, CYSTOSCOPY  10/31/2018    Procedure: DAVINCI SACROCOLPOPEXY, MIDURETHRAL SLING, CYSTOSCOPY;  Surgeon: Turner Jonas MD;  Location: [facility] OR     DAVINCI SALPINGO-OOPHORECTOMY INCLUDING BILATERAL Bilateral 10/31/2018    Procedure: DAVINCI SALPINGO-OOPHORECTOMY INCLUDING BILATERAL;  Surgeon: Turner Jonas MD;  Location:  OR     HYSTERECTOMY TOTAL ABDOMINAL  1988    for heavy periods     TONSILLECTOMY & ADENOIDECTOMY  1952     Family History   Problem Relation Age of Onset     Myocardial Infarction Father         later in life     Diabetes Type 2  Sister      Bladder Cancer Sister         smoking history     Dementia Sister         FTD     Aneurysm Sister         AAA     Cirrhosis Sister      Dementia Mother      Tongue cancer Brother      Cerebrovascular Disease No family hx of      Coronary Artery Disease Early Onset No  family hx of      Breast Cancer No family hx of      Ovarian Cancer No family hx of      Colon Cancer No family hx of      Social History     Occupational History     Occupation: Retired - [employer]   Tobacco Use     Smoking status: Former Smoker     Packs/day: 2.00     Years: 35.00     Pack years: 70.00     Types: Cigarettes     Quit date: 7/1/2006     Years since quitting: 15.2     Smokeless tobacco: Never Used   Substance and Sexual Activity     Alcohol use: No     Comment: history of alcohol abuse; sober since 1987     Drug use: No     Sexual activity: Not Currently   Social History Narrative     (as of 2020).     No kids.    Walks daily, but no formal exercise.       Allergies   Allergen Reactions     Penicillins Hives     Sulfa Drugs Hives     Current Outpatient Medications   Medication Sig     amLODIPine (NORVASC) 10 MG tablet Take 0.5 tablets (5 mg) by mouth daily     aspirin (ASA) 81 MG tablet Take 1 tablet (81 mg) by mouth daily     atenolol (TENORMIN) 100 MG tablet Take 1 tablet by mouth once daily     atorvastatin (LIPITOR) 40 MG tablet Take 1 tablet (40 mg) by mouth daily     Biotin 1000 MCG CHEW Take 1,000 mcg by mouth daily     Calcium Carbonate-Vit D-Min (CALCIUM 1200) 2673-0288 MG-UNIT CHEW Take 1 chew tab by mouth daily     hydrocortisone (WESTCORT) 0.2 % external cream Apply sparingly to affected area three times daily for 14 days.     LATISSE 0.03 % external opthalmic solution APPLY 1 DROP TOPICALLY AT BEDTIME     lisinopril (ZESTRIL) 20 MG tablet Take 1 tablet (20 mg) by mouth 2 times daily     LORazepam (ATIVAN) 0.5 MG tablet TAKE 1 TABLET BY MOUTH ONCE DAILY AS NEEDED FOR ANXIETY     omeprazole (PRILOSEC) 20 MG DR capsule Take 1 capsule (20 mg) by mouth daily     sertraline (ZOLOFT) 100 MG tablet Take 1 tablet (100 mg) by mouth daily     spironolactone (ALDACTONE) 25 MG tablet Take 1 tablet (25 mg) by mouth daily     triamcinolone (KENALOG) 0.1 % external cream  Apply topically 2 times daily as needed for irritation     vitamin D3 (CHOLECALCIFEROL) 1000 units (25 mcg) tablet Take 1 tablet (1,000 Units) by mouth daily     zolpidem (AMBIEN) 5 MG tablet TAKE 1 TABLET BY MOUTH AT BEDTIME     Immunization History   Administered Date(s) Administered     COVID-19,PF,Pfizer 03/04/2021, 03/25/2021     Flu 65+ Years 09/13/2018     Influenza (H1N1) 01/05/2010     Influenza (High Dose) 3 valent vaccine 09/11/2014, 09/21/2015, 09/07/2016, 08/29/2017, 09/13/2018, 09/09/2019     Influenza (IIV3) PF 10/29/1997, 11/04/2007, 10/05/2008, 10/01/2012, 09/19/2013     Influenza Intranasal Vaccine 4 valent (FluMist) 09/04/2020     Pneumo Conj 13-V (2010&after) 04/14/2015     Pneumococcal 23 valent 12/04/2007, 04/17/2018     TD (ADULT, 7+) 02/08/2006     TDAP Vaccine (Adacel) 02/09/2016     Zoster vaccine recombinant adjuvanted (SHINGRIX) 05/21/2018, 09/13/2018     Zoster vaccine, live 12/31/2008     PREVENTATIVE HEALTH                                                      BMI: within normal limits   Blood pressure: well-controlled on current regimen   Breast CA screening: up to date   Colon CA screening: up to date   Lung CA screening: patient does not meet screening criteria  Dexa: up to date   Screening cholesterol: DUE  Screening diabetes: DUE  Alcohol misuse screening: alcohol use reviewed - no intervention indicated at this time  Immunizations: reviewed; up to date     HEALTH RISK ASSESSMENT                                                      In general, how would you rate your overall physical health? good  Outside of work, how many days during the week do you exercise? 2-3 days/week  Outside of work, approximately how many minutes a day do you exercise? 15-30 minutes    If you drink alcohol do you typically have >3 drinks per day or >7 drinks per week? No  Do you usually eat at least 4 servings of fruit and vegetables a day, include whole grains & fiber and avoid regularly eating high fat  "or \"junk\" foods? No     Do you have any problems taking medications regularly? No  Do you have any side effects from medications? No    Assistance with daily activities: No    Safety concerns: No    Fall risk assessment: completed today (see ambulatory assessments)    Hearing concerns: No    In the past 6 months, have you been bothered by leaking of urine: No    In general, how would you rate your overall mental or emotional health: fair    PHQ-2/PHQ-9 assessment: completed today (see ambulatory assessments)    Additional concerns today: No    OBJECTIVE                                                      /72 (BP Location: Left arm, Patient Position: Chair, Cuff Size: Adult Regular)   Pulse 71   Temp 97.9  F (36.6  C) (Temporal)   Resp 14   Ht 1.549 m (5' 1\")   Wt 65.3 kg (144 lb)   SpO2 98%   BMI 27.21 kg/m    Constitutional: well-appearing  Head, Ears, and Eyes: normocephalic; normal external auditory canal and pinna; tympanic membranes visualized and normal; normal lids and conjunctivae  Neck: supple, symmetric, no thyromegaly or lymphadenopathy  Respiratory: normal respiratory effort; clear to auscultation bilaterally  Cardiovascular: regular rate and rhythm; no edema  Gastrointestinal: soft, non-tender, and non-distended; no organomegaly or masses  Musculoskeletal: normal gait and station  Psych: normal judgment and insight; normal mood and affect; recent and remote memory intact    Cognitive impairment noted: No  ---  (Note was completed, in part, with [employer] voice-recognition software. Documentation was reviewed, but some grammatical, spelling, and word errors may remain.)  "

## 2021-09-17 LAB
ALBUMIN SERPL-MCNC: 4 G/DL (ref 3.4–5)
ALP SERPL-CCNC: 50 U/L (ref 40–150)
ALT SERPL W P-5'-P-CCNC: 27 U/L (ref 0–50)
ANION GAP SERPL CALCULATED.3IONS-SCNC: 8 MMOL/L (ref 3–14)
AST SERPL W P-5'-P-CCNC: 19 U/L (ref 0–45)
BILIRUB SERPL-MCNC: 0.6 MG/DL (ref 0.2–1.3)
BUN SERPL-MCNC: 14 MG/DL (ref 7–30)
CALCIUM SERPL-MCNC: 9.3 MG/DL (ref 8.5–10.1)
CHLORIDE BLD-SCNC: 99 MMOL/L (ref 94–109)
CHOLEST SERPL-MCNC: 187 MG/DL
CO2 SERPL-SCNC: 25 MMOL/L (ref 20–32)
CREAT SERPL-MCNC: 0.96 MG/DL (ref 0.52–1.04)
FASTING STATUS PATIENT QL REPORTED: YES
GFR SERPL CREATININE-BSD FRML MDRD: 59 ML/MIN/1.73M2
GLUCOSE BLD-MCNC: 84 MG/DL (ref 70–99)
HDLC SERPL-MCNC: 89 MG/DL
LDLC SERPL CALC-MCNC: 85 MG/DL
NONHDLC SERPL-MCNC: 98 MG/DL
POTASSIUM BLD-SCNC: 4.5 MMOL/L (ref 3.4–5.3)
PROT SERPL-MCNC: 7.3 G/DL (ref 6.8–8.8)
SODIUM SERPL-SCNC: 132 MMOL/L (ref 133–144)
TRIGL SERPL-MCNC: 65 MG/DL

## 2021-09-18 LAB — DEPRECATED CALCIDIOL+CALCIFEROL SERPL-MC: 54 UG/L (ref 20–75)

## 2021-10-26 DIAGNOSIS — I25.10 CORONARY ARTERY DISEASE INVOLVING NATIVE CORONARY ARTERY OF NATIVE HEART WITHOUT ANGINA PECTORIS: ICD-10-CM

## 2021-10-26 DIAGNOSIS — F33.1 MODERATE EPISODE OF RECURRENT MAJOR DEPRESSIVE DISORDER (H): ICD-10-CM

## 2021-10-26 DIAGNOSIS — F41.1 GAD (GENERALIZED ANXIETY DISORDER): ICD-10-CM

## 2021-10-27 RX ORDER — ATORVASTATIN CALCIUM 40 MG/1
TABLET, FILM COATED ORAL
Qty: 90 TABLET | Refills: 2 | Status: SHIPPED | OUTPATIENT
Start: 2021-10-27 | End: 2022-07-12

## 2021-10-27 RX ORDER — SERTRALINE HYDROCHLORIDE 100 MG/1
TABLET, FILM COATED ORAL
Qty: 90 TABLET | Refills: 0 | Status: SHIPPED | OUTPATIENT
Start: 2021-10-27 | End: 2022-02-01

## 2021-10-27 NOTE — TELEPHONE ENCOUNTER
Routing refill request to provider for review/approval because:  PHQ-9 out of range  Ila Silverman RN

## 2021-11-16 DIAGNOSIS — I10 BENIGN ESSENTIAL HYPERTENSION: ICD-10-CM

## 2021-11-16 RX ORDER — SPIRONOLACTONE 25 MG/1
TABLET ORAL
Qty: 90 TABLET | Refills: 0 | Status: SHIPPED | OUTPATIENT
Start: 2021-11-16 | End: 2022-02-15

## 2021-11-16 NOTE — TELEPHONE ENCOUNTER
Routing refill request to provider for review/approval because:  Labs out of range:    Sodium   Date Value Ref Range Status   09/16/2021 132 (L) 133 - 144 mmol/L Final     Comment:     This is a corrected result. Previous result was 133 mmol/L on 9/17/2021 at  7:55 PM CDT  This is a corrected result. Previous result was 131 mmol/L on 9/17/2021 at 10:20 PM CDT   09/10/2020 134 133 - 144 mmol/L Final       Sophie Rodriguez RN

## 2021-11-30 DIAGNOSIS — I10 BENIGN ESSENTIAL HYPERTENSION: ICD-10-CM

## 2021-11-30 DIAGNOSIS — I25.10 CORONARY ARTERY DISEASE INVOLVING NATIVE CORONARY ARTERY OF NATIVE HEART WITHOUT ANGINA PECTORIS: ICD-10-CM

## 2021-12-01 RX ORDER — ATENOLOL 100 MG/1
TABLET ORAL
Qty: 90 TABLET | Refills: 2 | Status: SHIPPED | OUTPATIENT
Start: 2021-12-01 | End: 2022-08-25

## 2021-12-01 NOTE — TELEPHONE ENCOUNTER
Prescription approved per [facility] Refill Protocol.  Girma Ramirez RN  [facility] Triage Nurse

## 2021-12-13 ENCOUNTER — OFFICE VISIT (OUTPATIENT)
Dept: DERMATOLOGY | Facility: CLINIC | Age: 71
End: 2021-12-13
Payer: COMMERCIAL

## 2021-12-13 VITALS — HEART RATE: 65 BPM | OXYGEN SATURATION: 99 % | SYSTOLIC BLOOD PRESSURE: 121 MMHG | DIASTOLIC BLOOD PRESSURE: 69 MMHG

## 2021-12-13 DIAGNOSIS — Z85.828 HISTORY OF BASAL CELL CARCINOMA (BCC): ICD-10-CM

## 2021-12-13 DIAGNOSIS — L57.8 SOLAR ELASTOSIS: ICD-10-CM

## 2021-12-13 DIAGNOSIS — L57.0 ACTINIC KERATOSIS: Primary | ICD-10-CM

## 2021-12-13 DIAGNOSIS — L40.9 SCALP PSORIASIS: ICD-10-CM

## 2021-12-13 DIAGNOSIS — D22.9 MULTIPLE BENIGN NEVI: ICD-10-CM

## 2021-12-13 DIAGNOSIS — L82.1 SEBORRHEIC KERATOSES: ICD-10-CM

## 2021-12-13 DIAGNOSIS — L81.4 LENTIGINES: ICD-10-CM

## 2021-12-13 DIAGNOSIS — D18.01 CHERRY ANGIOMA: ICD-10-CM

## 2021-12-13 PROCEDURE — 99213 OFFICE O/P EST LOW 20 MIN: CPT | Mod: 25 | Performed by: PHYSICIAN ASSISTANT

## 2021-12-13 PROCEDURE — 17000 DESTRUCT PREMALG LESION: CPT | Performed by: PHYSICIAN ASSISTANT

## 2021-12-13 NOTE — PATIENT INSTRUCTIONS
Proper skin care from [facility]:    -Eliminate harsh soaps as they strip the natural oils from the skin, often resulting in dry itchy skin ( i.e. Dial, Zest, Kylah Spring)  -Use mild soaps such as Cetaphil or Dove Sensitive Skin in the shower. You do not need to use soap on arms, legs, and trunk every time you shower unless visibly soiled.   -Avoid hot or cold showers.  -After showering, lightly dry off and apply moisturizing within 2-3 minutes. This will help trap moisture in the skin.   -Aggressive use of a moisturizer at least 1-2 times a day to the entire body (including -Vanicream, Cetaphil, Aquaphor or Cerave) and moisturize hands after every washing.  -We recommend using moisturizers that come in a tub that needs to be scooped out, not a pump. This has more of an oil base. It will hold moisture in your skin much better than a water base moisturizer. The above recommended are non-pore clogging.      Wear a sunscreen with at least SPF 30 on your face, ears, neck and V of the chest daily. Wear sunscreen on other areas of the body if those areas are exposed to the sun throughout the day. Sunscreens can contain physical and/or chemical blockers. Physical blockers are less likely to clog pores, these include zinc oxide and titanium dioxide. Reapply every two hour and after swimming.     Sunscreen examples: https://www.ewg.org/sunscreen/    UV radiation  UVA radiation remains constant throughout the day and throughout the year. It is a longer wavelength than UVB and therefore penetrates deeper into the skin leading to immediate and delayed tanning, photoaging, and skin cancer. 70-80% of UVA and UVB radiation occurs between the hours of 10am-2pm.  UVB radiation  UVB radiation causes the most harmful effects and is more significant during the summer months. However, snow and ice can reflect UVB radiation leading to skin damage during the winter months as well. UVB radiation is responsible for tanning,  burning, inflammation, delayed erythema (pinkness), pigmentation (brown spots), and skin cancer.     I recommend self monthly full body exams and yearly full body exams with a dermatology provider. If you develop a new or changing lesion please follow up for examination. Most skin cancers are pink and scaly or pink and pearly. However, we do see blue/brown/black skin cancers.  Consider the ABCDEs of melanoma when giving yourself your monthly full body exam ( don't forget the groin, buttocks, feet, toes, etc). A-asymmetry, B-borders, C-color, D-diameter, E-elevation or evolving. If you see any of these changes please follow up in clinic. If you cannot see your back I recommend purchasing a hand held mirror to use with a larger wall mirror.          WOUND CARE INSTRUCTIONS  FOR CRYOSURGERY        This area treated with liquid nitrogen will form a blister. You do not need to bandage the area until after the blister forms and breaks (which may be a few days).  When the blister breaks, begin daily dressing changes as follows:    1) Clean and dry the area with tap water using clean Q-tip or sterile gauze pad.    2) Apply Aquaphor, Vaseline, Polysporin ointment or Bacitracin ointment over entire wound.  Do NOT use Neosporin ointment.    3) Cover the wound with a band-aid or sterile non-stick gauze pad and micropore paper tape.      REPEAT THESE INSTRUCTIONS AT LEAST ONCE A DAY UNTIL THE WOUND HAS COMPLETELY HEALED.        It is an old wives tale that a wound heals better when it is exposed to air and allowed to dry out. The wound will heal faster with a better cosmetic result if it is kept moist with ointment and covered with a bandage.  Do not let the wound dry out.      Supplies Needed:     *Cotton tipped applicators (Q-tips)   *Aquaphor, Vaseline, Polysporin ointment or Bacitracin ointment (NOT NEOSPORIN)   *Band-aids, or non stick gauze pads and micropore paper tape    PATIENT INFORMATION    During the healing process  you will notice a number of changes. All wounds develop a small halo of redness surrounding the wound.  This means healing is occurring. Severe itching with extensive redness usually indicates sensitivity to the ointment or bandage tape used to dress the wound.  You should call our office if this develops.      Swelling and/or discoloration around your surgical site is common, particularly when performed around the eye.    All wounds normally drain.  The larger the wound the more drainage there will be.  After 7-10 days, you will notice the wound beginning to shrink and new skin will begin to grow.  The wound is healed when you can see skin has formed over the entire area.  A healed wound has a healthy, shiny look to the surface and is red to dark pink in color to normalize.  Wounds may take approximately 4-6 weeks to heal.  Larger wounds may take 6-8 weeks.  After the wound is healed you may discontinue dressing changes.    You may experience a sensation of tightness as your wound heals. This is normal and will gradually subside.    Your healed wound may be sensitive to temperature changes. This sensitivity improves with time, but if you re having a lot of discomfort, try to avoid temperature extremes.    Patients frequently experience itching after their wound appears to have healed because of the continue healing under the skin.  Plain Vaseline will help relieve the itching.

## 2021-12-13 NOTE — PROGRESS NOTES
HPI:  Liz Haider is a 70 year old female patient here today for FBE. Has a scaly spot on right ear .  Patient states this has been present for a while.  Patient reports the following symptoms: scaly .  Patient reports the following previous treatments: ln2.  Patient reports the following modifying factors: none.  Associated symptoms: none.  Patient has no other skin complaints today.  Remainder of the HPI, Meds, PMH, Allergies, FH, and SH was reviewed in chart.    Pertinent Hx:   BCC  Past Medical History:   Diagnosis Date     Acid reflux disease      Benign essential hypertension      CAD (coronary artery disease) 2006    [facility]; acute NSTEMI, severe single vessel disease in RCA, PTCA/EUGENIO     VISHAL (generalized anxiety disorder)      History of alcohol abuse     sober since 1987     History of basal cell carcinoma     multiple around right eye     Impaired fasting glucose      MDD (major depressive disorder)      Microscopic hematuria     multiple, negative work-ups     Osteopenia        Past Surgical History:   Procedure Laterality Date     COLONOSCOPY  4/20/2012    Procedure:COLONOSCOPY; COLONOSCOPY; Surgeon:EDSON SHELLEY; Location: GI     DAVINCI RECTOPEXY N/A 10/31/2018    Procedure: ROBOTIC ASSISTED XI VENTRAL RECTOPEXY (DENIS) ;  Surgeon: Kirsten Huynh MD;  Location:  OR     DAVINCI SACROCOLPOPEXY, CYSTOSCOPY, COMBINED N/A 10/31/2018    Procedure: ROBOTIC ASSISTED XI SACROCOLPOPEXY, CYSTOSCOPY (CEFERINO), BILATERAL SALPINGO-OORPHORECTOMY, MID URETHRAL SLING;  Surgeon: Turner Jonas MD;  Location:  OR     DAVINCI SACROCOLPOPEXY, MIDURETHRAL SLING, CYSTOSCOPY  10/31/2018    Procedure: DAVINCI SACROCOLPOPEXY, MIDURETHRAL SLING, CYSTOSCOPY;  Surgeon: Turner Jonas MD;  Location:  OR     DAVINCI SALPINGO-OOPHORECTOMY INCLUDING BILATERAL Bilateral 10/31/2018    Procedure: DAVINCI SALPINGO-OOPHORECTOMY INCLUDING BILATERAL;  Surgeon: Turner Jonas MD;  Location:  OR      HYSTERECTOMY TOTAL ABDOMINAL  1988    for heavy periods     TONSILLECTOMY & ADENOIDECTOMY  1952        Family History   Problem Relation Age of Onset     Myocardial Infarction Father         later in life     Diabetes Type 2  Sister      Bladder Cancer Sister         smoking history     Dementia Sister         FTD     Aneurysm Sister         AAA     Cirrhosis Sister      Dementia Mother      Tongue cancer Brother      Cerebrovascular Disease No family hx of      Coronary Artery Disease Early Onset No family hx of      Breast Cancer No family hx of      Ovarian Cancer No family hx of      Colon Cancer No family hx of        Social History     Socioeconomic History     Marital status:      Spouse name: Not on file     Number of children: Not on file     Years of education: Not on file     Highest education level: Not on file   Occupational History     Occupation: Retired - //   Tobacco Use     Smoking status: Former Smoker     Packs/day: 2.00     Years: 35.00     Pack years: 70.00     Types: Cigarettes     Quit date: 7/1/2006     Years since quitting: 15.4     Smokeless tobacco: Never Used   Substance and Sexual Activity     Alcohol use: No     Comment: history of alcohol abuse; sober since 1987     Drug use: No     Sexual activity: Not Currently   Other Topics Concern     Parent/sibling w/ CABG, MI or angioplasty before 65F 55M? No   Social History Narrative     (as of 2020).     No kids.    Walks daily, but no formal exercise.       Social Determinants of Health     Financial Resource Strain: Not on file   Food Insecurity: Not on file   Transportation Needs: Not on file   Physical Activity: Not on file   Stress: Not on file   Social Connections: Not on file   Intimate Partner Violence: Not on file   Housing Stability: Not on file       Outpatient Encounter Medications as of 12/13/2021   Medication Sig Dispense Refill     amLODIPine (NORVASC) 10 MG tablet Take 1/2  (one-half) tablet by mouth once daily 45 tablet 0     ARIPiprazole (ABILIFY) 2 MG tablet Take 1 tablet (2 mg) by mouth daily 90 tablet 0     aspirin (ASA) 81 MG tablet Take 1 tablet (81 mg) by mouth daily       atenolol (TENORMIN) 100 MG tablet Take 1 tablet by mouth once daily 90 tablet 2     atorvastatin (LIPITOR) 40 MG tablet Take 1 tablet by mouth once daily 90 tablet 2     Biotin 1000 MCG CHEW Take 1,000 mcg by mouth daily       Calcium Carbonate-Vit D-Min (CALCIUM 1200) 3442-1103 MG-UNIT CHEW Take 1 chew tab by mouth daily       hydrocortisone (WESTCORT) 0.2 % external cream APPLY SPARINGLY TO AFFECTED AREA THREE TIMES DAILY FOR 14 DAYS 15 g 0     hydrOXYzine (ATARAX) 10 MG tablet Take 1-2 tablets (10-20 mg) by mouth 3 times daily as needed for anxiety 30 tablet 0     LATISSE 0.03 % external opthalmic solution APPLY 1 DROP TOPICALLY AT BEDTIME 3 mL 0     lisinopril (ZESTRIL) 20 MG tablet Take 1 tablet by mouth twice daily 180 tablet 0     LORazepam (ATIVAN) 0.5 MG tablet Take 1 tablet (0.5 mg) by mouth daily as needed for anxiety 110 tablet 0     omeprazole (PRILOSEC) 20 MG DR capsule Take 1 capsule by mouth once daily 90 capsule 0     sertraline (ZOLOFT) 100 MG tablet Take 1 tablet by mouth once daily 90 tablet 0     spironolactone (ALDACTONE) 25 MG tablet Take 1 tablet by mouth once daily 90 tablet 0     triamcinolone (KENALOG) 0.1 % external cream Apply topically 2 times daily as needed for irritation 30 g 3     vitamin D3 (CHOLECALCIFEROL) 1000 units (25 mcg) tablet Take 1 tablet (1,000 Units) by mouth daily       zolpidem (AMBIEN) 5 MG tablet TAKE 1 TABLET BY MOUTH AT BEDTIME 90 tablet 1     No facility-administered encounter medications on file as of 12/13/2021.       Review Of Systems:  Skin: self  Eyes: negative  Ears/Nose/Throat: negative  Respiratory: No shortness of breath, dyspnea on exertion, cough, or hemoptysis  Cardiovascular: negative  Gastrointestinal: negative  Genitourinary:  negative  Musculoskeletal: negative  Neurologic: negative  Psychiatric: negative  Hematologic/Lymphatic/Immunologic: negative  Endocrine: negative      Objective:     There were no vitals taken for this visit.  Eyes: Conjunctivae/lids: Normal   ENT: Lips:  Normal  MSK: Normal  Cardiovascular: Peripheral edema none  Pulm: Breathing Normal  Neuro/Psych: Orientation: A/O x 3. Normal; Mood/Affect: Normal, NAD, WDWN  Pt accompanied by: self  Following areas examined: Scalp, face, eyelids, lips, neck, chest, abdomen, back, R&L upper and lower extremities.  Pt defers exam of buttock, hips, groin and genitals.   Richards skin type:i   Findings:  Red smooth well-defined macules on trunk and extremities.  Brown, stuck-on scaly appearing papules on trunk and extremities.  Well circumscribed macules with symmetric color distribution on trunk and extremities.  Tan WD smooth macules on face, neck, trunk, and extremities.  Pink gritty macule on right mid helix     Assessment and Plan:     1) Cherry angiomas, Seborrheic keratoses, Benign nevi, Lentigines     I discussed the specifics of tumor, prognosis, and genetics of benign lesions.  I explained that treatment of these lesions would be purely cosmetic and not medically neccessary.  I discussed with patient different removal options including excision, cryotherapy, cautery and /or laser.  Lesion may recur and/or may not completely resolve. May need additional treatment.     2) Actinic keratoses x 1 and solar elastosis  Due to chronic sun exposure.    LN2 for 5 seconds x 2. Discussed AE include hypopigmentation (white spot) and recurrence. Follow up in 2-3 months to recheck lesions. There is a risk of AKs developing into a SCC.   Treatment options include LN2 vs PDT vs Efudex. Pt elected LN2     3) Scalp psoriasis -  Well controlled on fluocinonide solution and TAC cream.  Pt will call for refills.     4) BCC of medial canthus     No evidence of recurrence  Reviewed patient  derm chart from 2/7/2020.  Signs and Symptoms of non-melanoma skin cancer and ABCDEs of melanoma reviewed with patient. Patient encouraged to perform monthly self skin exams and educated on how to perform them. UV precautions reviewed with patient. Patient was asked about new or changing moles/lesions on body.   Wear a sunscreen with at least SPF 30 on your face, ears, neck and V of the chest daily. Wear sunscreen on other areas of the body if those areas are exposed to the sun throughout the day. Sunscreens can contain physical and/or chemical blockers. Physical blockers are less likely to clog pores, these include zinc oxide and titanium dioxide. Reapply every two hour and after swimming.Sunscreen examples: https://www.ewg.org/sunscreen/    Proper skin care from [facility]:    -Eliminate harsh soaps as they strip the natural oils from the skin, often resulting in dry itchy skin ( i.e. Dial, Zest, [place])  -Use mild soaps such as Cetaphil or Dove Sensitive Skin in the shower. You do not need to use soap on arms, legs, and trunk every time you shower unless visibly soiled.   -Avoid hot or cold showers.  -After showering, lightly dry off and apply moisturizing within 2-3 minutes. This will help trap moisture in the skin.   -Aggressive use of a moisturizer at least 1-2 times a day to the entire body (including -Vanicream, Cetaphil, Aquaphor or Cerave) and moisturize hands after every washing.  -We recommend using moisturizers that come in a tub that needs to be scooped out, not a pump. This has more of an oil base. It will hold moisture in your skin much better than a water base moisturizer. The above recommended are non-pore clogging.         It was a pleasure speaking with Liz Haider today.       Follow up in yearly FBE yearly FBE. 2-3 months if ak not resolved.

## 2021-12-13 NOTE — LETTER
12/13/2021         RE: Liz Haider  [address]  [facility] [address]        Dear Colleague,    Thank you for referring your patient, Liz Haider, to the [facility]. Please see a copy of my visit note below.    HPI:  Liz Haider is a 70 year old female patient here today for FBE. Has a scaly spot on right ear .  Patient states this has been present for a while.  Patient reports the following symptoms: scaly .  Patient reports the following previous treatments: ln2.  Patient reports the following modifying factors: none.  Associated symptoms: none.  Patient has no other skin complaints today.  Remainder of the HPI, Meds, PMH, Allergies, FH, and SH was reviewed in chart.    Pertinent Hx:   BCC  Past Medical History:   Diagnosis Date     Acid reflux disease      Benign essential hypertension      CAD (coronary artery disease) 2006    [facility]; acute NSTEMI, severe single vessel disease in RCA, PTCA/EUGENIO     VISHAL (generalized anxiety disorder)      History of alcohol abuse     sober since 1987     History of basal cell carcinoma     multiple around right eye     Impaired fasting glucose      MDD (major depressive disorder)      Microscopic hematuria     multiple, negative work-ups     Osteopenia        Past Surgical History:   Procedure Laterality Date     COLONOSCOPY  4/20/2012    Procedure:COLONOSCOPY; COLONOSCOPY; Surgeon:EDSON SHELLEY; Location: GI     DAVINCI RECTOPEXY N/A 10/31/2018    Procedure: ROBOTIC ASSISTED XI VENTRAL RECTOPEXY (DENIS) ;  Surgeon: Kirsten Huynh MD;  Location:  OR     DAVINCI SACROCOLPOPEXY, CYSTOSCOPY, COMBINED N/A 10/31/2018    Procedure: ROBOTIC ASSISTED XI SACROCOLPOPEXY, CYSTOSCOPY (CEFERINO), BILATERAL SALPINGO-OORPHORECTOMY, MID URETHRAL SLING;  Surgeon: Turner Jonas MD;  Location:  OR     DAVINCI SACROCOLPOPEXY, MIDURETHRAL SLING, CYSTOSCOPY  10/31/2018    Procedure: DAVINCI SACROCOLPOPEXY, MIDURETHRAL  SLING, CYSTOSCOPY;  Surgeon: Turner Jonas MD;  Location: [facility]     DAVINCI SALPINGO-OOPHORECTOMY INCLUDING BILATERAL Bilateral 10/31/2018    Procedure: DAVINCI SALPINGO-OOPHORECTOMY INCLUDING BILATERAL;  Surgeon: Turner Jonas MD;  Location: [facility] OR     HYSTERECTOMY TOTAL ABDOMINAL  1988    for heavy periods     TONSILLECTOMY & ADENOIDECTOMY  1952        Family History   Problem Relation Age of Onset     Myocardial Infarction Father         later in life     Diabetes Type 2  Sister      Bladder Cancer Sister         smoking history     Dementia Sister         FTD     Aneurysm Sister         AAA     Cirrhosis Sister      Dementia Mother      Tongue cancer Brother      Cerebrovascular Disease No family hx of      Coronary Artery Disease Early Onset No family hx of      Breast Cancer No family hx of      Ovarian Cancer No family hx of      Colon Cancer No family hx of        Social History     Socioeconomic History     Marital status:      Spouse name: Not on file     Number of children: Not on file     Years of education: Not on file     Highest education level: Not on file   Occupational History     Occupation: Retired - //   Tobacco Use     Smoking status: Former Smoker     Packs/day: 2.00     Years: 35.00     Pack years: 70.00     Types: Cigarettes     Quit date: 7/1/2006     Years since quitting: 15.4     Smokeless tobacco: Never Used   Substance and Sexual Activity     Alcohol use: No     Comment: history of alcohol abuse; sober since 1987     Drug use: No     Sexual activity: Not Currently   Other Topics Concern     Parent/sibling w/ CABG, MI or angioplasty before 65F 55M? No   Social History Narrative     (as of 2020).     No kids.    Walks daily, but no formal exercise.       Social Determinants of Health     Financial Resource Strain: Not on file   Food Insecurity: Not on file   Transportation Needs: Not on file   Physical Activity: Not on file    Stress: Not on file   Social Connections: Not on file   Intimate Partner Violence: Not on file   Housing Stability: Not on file       Outpatient Encounter Medications as of 12/13/2021   Medication Sig Dispense Refill     amLODIPine (NORVASC) 10 MG tablet Take 1/2 (one-half) tablet by mouth once daily 45 tablet 0     ARIPiprazole (ABILIFY) 2 MG tablet Take 1 tablet (2 mg) by mouth daily 90 tablet 0     aspirin (ASA) 81 MG tablet Take 1 tablet (81 mg) by mouth daily       atenolol (TENORMIN) 100 MG tablet Take 1 tablet by mouth once daily 90 tablet 2     atorvastatin (LIPITOR) 40 MG tablet Take 1 tablet by mouth once daily 90 tablet 2     Biotin 1000 MCG CHEW Take 1,000 mcg by mouth daily       Calcium Carbonate-Vit D-Min (CALCIUM 1200) 0735-0455 MG-UNIT CHEW Take 1 chew tab by mouth daily       hydrocortisone (WESTCORT) 0.2 % external cream APPLY SPARINGLY TO AFFECTED AREA THREE TIMES DAILY FOR 14 DAYS 15 g 0     hydrOXYzine (ATARAX) 10 MG tablet Take 1-2 tablets (10-20 mg) by mouth 3 times daily as needed for anxiety 30 tablet 0     LATISSE 0.03 % external opthalmic solution APPLY 1 DROP TOPICALLY AT BEDTIME 3 mL 0     lisinopril (ZESTRIL) 20 MG tablet Take 1 tablet by mouth twice daily 180 tablet 0     LORazepam (ATIVAN) 0.5 MG tablet Take 1 tablet (0.5 mg) by mouth daily as needed for anxiety 110 tablet 0     omeprazole (PRILOSEC) 20 MG DR capsule Take 1 capsule by mouth once daily 90 capsule 0     sertraline (ZOLOFT) 100 MG tablet Take 1 tablet by mouth once daily 90 tablet 0     spironolactone (ALDACTONE) 25 MG tablet Take 1 tablet by mouth once daily 90 tablet 0     triamcinolone (KENALOG) 0.1 % external cream Apply topically 2 times daily as needed for irritation 30 g 3     vitamin D3 (CHOLECALCIFEROL) 1000 units (25 mcg) tablet Take 1 tablet (1,000 Units) by mouth daily       zolpidem (AMBIEN) 5 MG tablet TAKE 1 TABLET BY MOUTH AT BEDTIME 90 tablet 1     No facility-administered encounter medications on  file as of 12/13/2021.       Review Of Systems:  Skin: self  Eyes: negative  Ears/Nose/Throat: negative  Respiratory: No shortness of breath, dyspnea on exertion, cough, or hemoptysis  Cardiovascular: negative  Gastrointestinal: negative  Genitourinary: negative  Musculoskeletal: negative  Neurologic: negative  Psychiatric: negative  Hematologic/Lymphatic/Immunologic: negative  Endocrine: negative      Objective:     There were no vitals taken for this visit.  Eyes: Conjunctivae/lids: Normal   ENT: Lips:  Normal  MSK: Normal  Cardiovascular: Peripheral edema none  Pulm: Breathing Normal  Neuro/Psych: Orientation: A/O x 3. Normal; Mood/Affect: Normal, NAD, WDWN  Pt accompanied by: self  Following areas examined: Scalp, face, eyelids, lips, neck, chest, abdomen, back, R&L upper and lower extremities.  Pt defers exam of buttock, hips, groin and genitals.   Richards skin type:i   Findings:  Red smooth well-defined macules on trunk and extremities.  Brown, stuck-on scaly appearing papules on trunk and extremities.  Well circumscribed macules with symmetric color distribution on trunk and extremities.  Tan WD smooth macules on face, neck, trunk, and extremities.  Pink gritty macule on right mid helix     Assessment and Plan:     1) Cherry angiomas, Seborrheic keratoses, Benign nevi, Lentigines     I discussed the specifics of tumor, prognosis, and genetics of benign lesions.  I explained that treatment of these lesions would be purely cosmetic and not medically neccessary.  I discussed with patient different removal options including excision, cryotherapy, cautery and /or laser.  Lesion may recur and/or may not completely resolve. May need additional treatment.     2) Actinic keratoses x 1 and solar elastosis  Due to chronic sun exposure.    LN2 for 5 seconds x 2. Discussed AE include hypopigmentation (white spot) and recurrence. Follow up in 2-3 months to recheck lesions. There is a risk of AKs developing into a SCC.    Treatment options include LN2 vs PDT vs Efudex. Pt elected LN2     3) Scalp psoriasis -  Well controlled on fluocinonide solution and TAC cream.  Pt will call for refills.     4) BCC of medial canthus     No evidence of recurrence  Reviewed patient derm chart from 2/7/2020.  Signs and Symptoms of non-melanoma skin cancer and ABCDEs of melanoma reviewed with patient. Patient encouraged to perform monthly self skin exams and educated on how to perform them. UV precautions reviewed with patient. Patient was asked about new or changing moles/lesions on body.   Wear a sunscreen with at least SPF 30 on your face, ears, neck and V of the chest daily. Wear sunscreen on other areas of the body if those areas are exposed to the sun throughout the day. Sunscreens can contain physical and/or chemical blockers. Physical blockers are less likely to clog pores, these include zinc oxide and titanium dioxide. Reapply every two hour and after swimming.Sunscreen examples: https://www.ewg.org/sunscreen/    Proper skin care from [facility]:    -Eliminate harsh soaps as they strip the natural oils from the skin, often resulting in dry itchy skin ( i.e. Dial, Zest, [place])  -Use mild soaps such as Cetaphil or Dove Sensitive Skin in the shower. You do not need to use soap on arms, legs, and trunk every time you shower unless visibly soiled.   -Avoid hot or cold showers.  -After showering, lightly dry off and apply moisturizing within 2-3 minutes. This will help trap moisture in the skin.   -Aggressive use of a moisturizer at least 1-2 times a day to the entire body (including -Vanicream, Cetaphil, Aquaphor or Cerave) and moisturize hands after every washing.  -We recommend using moisturizers that come in a tub that needs to be scooped out, not a pump. This has more of an oil base. It will hold moisture in your skin much better than a water base moisturizer. The above recommended are non-pore clogging.         It was a  pleasure speaking with Liz Haider today.       Follow up in yearly FBE yearly FBE. 2-3 months if ak not resolved.         Again, thank you for allowing me to participate in the care of your patient.        Sincerely,        Raina Diego PA-C

## 2021-12-19 ENCOUNTER — MYC REFILL (OUTPATIENT)
Dept: INTERNAL MEDICINE | Facility: CLINIC | Age: 71
End: 2021-12-19
Payer: COMMERCIAL

## 2021-12-19 DIAGNOSIS — L65.9 HYPOTRICHOSIS: ICD-10-CM

## 2021-12-21 RX ORDER — BIMATOPROST 0.3 MG/ML
SOLUTION/ DROPS OPHTHALMIC
Qty: 3 ML | Refills: 0 | OUTPATIENT
Start: 2021-12-21

## 2021-12-21 NOTE — TELEPHONE ENCOUNTER
Routing refill request to provider for review/approval because:  Drug not on the [facility] refill protocol   Sophie Rodriguez RN

## 2021-12-28 RX ORDER — BIMATOPROST 0.3 MG/ML
SOLUTION/ DROPS OPHTHALMIC
Qty: 3 ML | Refills: 0 | Status: CANCELLED | OUTPATIENT
Start: 2021-12-28

## 2021-12-28 NOTE — TELEPHONE ENCOUNTER
Appears patient saw dermatology on 12/13/21. Refill forwarded to them to see if they are comfortable prescribing this medication going forward.

## 2021-12-28 NOTE — TELEPHONE ENCOUNTER
Sent [employer] message asking below questions.    Liz MORGANRN BSN  [facility]  [phone]

## 2022-01-16 ENCOUNTER — OFFICE VISIT (OUTPATIENT)
Dept: URGENT CARE | Facility: URGENT CARE | Age: 72
End: 2022-01-16
Payer: COMMERCIAL

## 2022-01-16 VITALS
WEIGHT: 144 LBS | RESPIRATION RATE: 12 BRPM | BODY MASS INDEX: 27.21 KG/M2 | SYSTOLIC BLOOD PRESSURE: 145 MMHG | HEART RATE: 78 BPM | OXYGEN SATURATION: 98 % | TEMPERATURE: 96.8 F | DIASTOLIC BLOOD PRESSURE: 79 MMHG

## 2022-01-16 DIAGNOSIS — N30.00 ACUTE CYSTITIS WITHOUT HEMATURIA: Primary | ICD-10-CM

## 2022-01-16 DIAGNOSIS — R30.0 DYSURIA: ICD-10-CM

## 2022-01-16 LAB
ALBUMIN UR-MCNC: NEGATIVE MG/DL
APPEARANCE UR: CLEAR
BACTERIA #/AREA URNS HPF: ABNORMAL /HPF
BILIRUB UR QL STRIP: NEGATIVE
COLOR UR AUTO: YELLOW
GLUCOSE UR STRIP-MCNC: NEGATIVE MG/DL
HGB UR QL STRIP: ABNORMAL
KETONES UR STRIP-MCNC: NEGATIVE MG/DL
LEUKOCYTE ESTERASE UR QL STRIP: ABNORMAL
NITRATE UR QL: NEGATIVE
PH UR STRIP: 5.5 [PH] (ref 5–7)
RBC #/AREA URNS AUTO: ABNORMAL /HPF
SP GR UR STRIP: 1.01 (ref 1–1.03)
SQUAMOUS #/AREA URNS AUTO: ABNORMAL /LPF
UROBILINOGEN UR STRIP-ACNC: 0.2 E.U./DL
WBC #/AREA URNS AUTO: ABNORMAL /HPF
WBC CLUMPS #/AREA URNS HPF: PRESENT /HPF

## 2022-01-16 PROCEDURE — 81001 URINALYSIS AUTO W/SCOPE: CPT | Performed by: PHYSICIAN ASSISTANT

## 2022-01-16 PROCEDURE — 99203 OFFICE O/P NEW LOW 30 MIN: CPT | Performed by: PHYSICIAN ASSISTANT

## 2022-01-16 PROCEDURE — 87086 URINE CULTURE/COLONY COUNT: CPT | Performed by: PHYSICIAN ASSISTANT

## 2022-01-16 RX ORDER — CEPHALEXIN 500 MG/1
500 CAPSULE ORAL 4 TIMES DAILY
Qty: 28 CAPSULE | Refills: 0 | Status: SHIPPED | OUTPATIENT
Start: 2022-01-16 | End: 2022-01-23

## 2022-01-16 NOTE — PATIENT INSTRUCTIONS

## 2022-01-16 NOTE — PROGRESS NOTES
Dysuria  - UA reflex to Microscopic and Culture  - Urine Microscopic  - cephALEXin (KEFLEX) 500 MG capsule; Take 1 capsule (500 mg) by mouth 4 times daily for 7 days  - Urine Culture    Acute cystitis without hematuria  - cephALEXin (KEFLEX) 500 MG capsule; Take 1 capsule (500 mg) by mouth 4 times daily for 7 days     See Patient Instructions  Patient Instructions     Patient Education     Bladder Infection, Female (Adult)     Urine normally doesn't have any germs (bacteria) in it. But bacteria can get into the urinary tract from the skin around the rectum. Or they can travel in the blood from other parts of the body. Once they are in your urinary tract, they can cause infection in these areas:    The urethra (urethritis)    The bladder (cystitis)    The kidneys (pyelonephritis)  The most common place for an infection is in the bladder. This is called a bladder infection. This is one of the most common infections in women. Most bladder infections are easily treated. They are not serious unless the infection spreads to the kidney.  The terms bladder infection, UTI, and cystitis are often used to describe the same thing. But they are not always the same. Cystitis is an inflammation of the bladder. The most common cause of cystitis is an infection.  Symptoms  The infection causes inflammation in the urethra and bladder. This causes many of the symptoms. The most common symptoms of a bladder infection are:    Pain or burning when urinating    Having to urinate more often than normal    Urgent need to urinate    Only a small amount of urine comes out    Blood in urine    Belly (abdominal) discomfort. This is often in the lower belly above the pubic bone.    Cloudy urine    Strong- or bad-smelling urine    Unable to urinate (urinary retention)    Unable to hold urine in (urinary incontinence)    Fever    Loss of appetite    Confusion (in older adults)  Causes  Bladder infections are not contagious. You can't get one  from someone else, from a toilet seat, or from sharing a bath.  The most common cause of bladder infections is bacteria from the bowels. The bacteria get onto the skin around the opening of the urethra. From there, they can get into the urine. Then they travel up to the bladder, causing inflammation and infection. This often happens because of:    Wiping incorrectly after urinating. Always wipe from front to back.    Bowel incontinence    Pregnancy    Procedures such as having a catheter put in    Older age    Not emptying your bladder. This can give bacteria a chance to grow in your urine.    Fluid loss (dehydration)    Constipation    Having sex    Using a diaphragm for birth control   Treatment  Bladder infections are diagnosed by a urine test and urine culture. They are treated with antibiotics. They often clear up quickly without problems. Treatment helps prevent a more serious kidney infection.  Medicines  Medicines can help in the treatment of a bladder infection:    Take antibiotics until they are used up, even if you feel better. It's important to finish them to make sure the infection has cleared.    You can use acetaminophen or ibuprofen for pain, fever, or discomfort, unless another medicine was prescribed. If you have long-term (chronic) liver or kidney disease, talk with your healthcare provider before using these medicines. Also talk with your provider if you've ever had a stomach ulcer or GI (gastrointestinal) bleeding, or are taking blood-thinner medicines.    If you are given phenazopydridine to reduce burning with urination, it will make your urine a bright orange color. This can stain clothing.  Care and prevention  These self-care steps can help prevent future infections:    Drink plenty of fluids. This helps to prevent dehydration and flush out your bladder. Do this unless you must restrict fluids for other health reasons, or your healthcare provider told you not to.    Clean yourself correctly  after going to the bathroom. Wipe from front to back after using the toilet. This helps prevent the spread of bacteria.    Urinate more often. Don't try to hold urine in for a long time.    Wear loose-fitting clothes and cotton underwear. Don't wear tight-fitting pants.    Improve your diet and prevent constipation. Eat more fresh fruits and vegetables, and fiber. Eat less junk foods and fatty foods.    Don't have sex until your symptoms are gone.    Don't have caffeine, alcohol, and spicy foods. These can irritate your bladder.    Urinate right after you have sex to flush out your bladder.    If you use birth control pills and have frequent bladder infections, discuss it with your healthcare provider.  Follow-up care  Call your healthcare provider if all symptoms are not gone after 3 days of treatment. This is especially important if you have repeat infections.  If a culture was done, you will be told if your treatment needs to be changed. If directed, you can call to find out the results.  If X-rays were done, you will be told if the results will affect your treatment.  Call 911  Call 911 if any of the following occur:    Trouble breathing    Hard to wake up or confusion    Fainting (loss of consciousness)    Fast heart rate  When to get medical advice  Call your healthcare provider right away if any of these occur:    Fever of 100.4 F (38.0 C) or higher, or as directed by your healthcare provider    Symptoms are not better after 3 days of treatment    Back or belly pain that gets worse    Repeated vomiting, or unable to keep medicine down    Weakness or dizziness    Vaginal discharge    Pain, redness, or swelling in the outer vaginal area (labia)  [employer] last reviewed this educational content on 11/1/2019 2000-2021 [employer]. All rights reserved. This information is not intended as a substitute for professional medical care. Always follow your healthcare professional's instructions.                Saul Pavon PA-C  [facility]    Subjective   71 year old who presents to clinic today for the following health issues:    Urgent Care       HPI     Genitourinary - Female  Onset/Duration: vaginal irritation and frequecy rination X  1 week.   Description:   Painful urination (Dysuria): YES           Frequency: YES  Blood in urine (Hematuria): no  Delay in urine (Hesitency): no  Intensity: moderate  Progression of Symptoms:  same  Accompanying Signs & Symptoms:  Fever/chills: no  Flank pain: Mild   Nausea and vomiting: no  Vaginal symptoms: itching  Abdominal/Pelvic Pain: no  History:   History of frequent UTI s: no  History of kidney stones: no  Precipitating or alleviating factors: None  Therapies tried and outcome: OTC advil or tylenol     Review of Systems   Review of Systems   See HPI     Objective    Temp: 96.8  F (36  C) Temp src: Tympanic BP: (!) 145/79 Pulse: 78   Resp: 12 SpO2: 98 %       Physical Exam   Physical Exam  Constitutional:       General: She is not in acute distress.     Appearance: Normal appearance. She is normal weight. She is not ill-appearing, toxic-appearing or diaphoretic.   HENT:      Head: Normocephalic and atraumatic.   Cardiovascular:      Rate and Rhythm: Normal rate.      Pulses: Normal pulses.   Pulmonary:      Effort: Pulmonary effort is normal. No respiratory distress.   Abdominal:      Tenderness: There is no right CVA tenderness or left CVA tenderness.   Neurological:      General: No focal deficit present.      Mental Status: She is alert and oriented to person, place, and time. Mental status is at baseline.      Gait: Gait normal.   Psychiatric:         Mood and Affect: Mood normal.         Behavior: Behavior normal.         Thought Content: Thought content normal.         Judgment: Judgment normal.          Results for orders placed or performed in visit on 01/16/22 (from the past 24 hour(s))   UA reflex to Microscopic and Culture    Specimen:  Urine, Midstream   Result Value Ref Range    Color Urine Yellow Colorless, Straw, Light Yellow, Yellow    Appearance Urine Clear Clear    Glucose Urine Negative Negative mg/dL    Bilirubin Urine Negative Negative    Ketones Urine Negative Negative mg/dL    Specific Gravity Urine 1.010 1.003 - 1.035    Blood Urine Moderate (A) Negative    pH Urine 5.5 5.0 - 7.0    Protein Albumin Urine Negative Negative mg/dL    Urobilinogen Urine 0.2 0.2, 1.0 E.U./dL    Nitrite Urine Negative Negative    Leukocyte Esterase Urine Small (A) Negative   Urine Microscopic   Result Value Ref Range    Bacteria Urine Few (A) None Seen /HPF    RBC Urine 5-10 (A) 0-2 /HPF /HPF    WBC Urine 10-25 (A) 0-5 /HPF /HPF    Squamous Epithelials Urine Few (A) None Seen /LPF    WBC Clumps Urine Present (A) None Seen /HPF

## 2022-01-17 LAB — BACTERIA UR CULT: NORMAL

## 2022-01-18 ENCOUNTER — MYC REFILL (OUTPATIENT)
Dept: INTERNAL MEDICINE | Facility: CLINIC | Age: 72
End: 2022-01-18
Payer: COMMERCIAL

## 2022-01-18 DIAGNOSIS — I10 BENIGN ESSENTIAL HYPERTENSION: ICD-10-CM

## 2022-01-18 DIAGNOSIS — F41.1 GAD (GENERALIZED ANXIETY DISORDER): ICD-10-CM

## 2022-01-19 RX ORDER — AMLODIPINE BESYLATE 10 MG/1
5 TABLET ORAL DAILY
Qty: 45 TABLET | Refills: 1 | Status: SHIPPED | OUTPATIENT
Start: 2022-01-19 | End: 2022-07-12

## 2022-01-19 RX ORDER — HYDROXYZINE HYDROCHLORIDE 10 MG/1
10-20 TABLET, FILM COATED ORAL 3 TIMES DAILY PRN
Qty: 30 TABLET | Refills: 2 | Status: SHIPPED | OUTPATIENT
Start: 2022-01-19 | End: 2022-06-20

## 2022-01-19 NOTE — TELEPHONE ENCOUNTER
Routing refill request to provider for review/approval because:  BP Readings from Last 3 Encounters:   01/16/22 (!) 145/79   12/13/21 121/69   09/16/21 122/72         Ciera Everett RN

## 2022-01-24 DIAGNOSIS — I10 BENIGN ESSENTIAL HYPERTENSION: ICD-10-CM

## 2022-01-26 RX ORDER — LISINOPRIL 20 MG/1
TABLET ORAL
Qty: 180 TABLET | Refills: 0 | Status: SHIPPED | OUTPATIENT
Start: 2022-01-26 | End: 2022-04-19

## 2022-01-26 NOTE — TELEPHONE ENCOUNTER
Routing refill request to provider for review/approval because:  Labs out of range:  bp  Conrad MORGAN RN, BSN

## 2022-01-28 ENCOUNTER — E-VISIT (OUTPATIENT)
Dept: INTERNAL MEDICINE | Facility: CLINIC | Age: 72
End: 2022-01-28
Payer: COMMERCIAL

## 2022-01-28 DIAGNOSIS — N39.0 ACUTE UTI (URINARY TRACT INFECTION): Primary | ICD-10-CM

## 2022-01-28 PROCEDURE — 99207 PR NON-BILLABLE SERV PER CHARTING: CPT | Performed by: INTERNAL MEDICINE

## 2022-01-29 RX ORDER — NITROFURANTOIN 25; 75 MG/1; MG/1
100 CAPSULE ORAL 2 TIMES DAILY
Qty: 10 CAPSULE | Refills: 0 | Status: SHIPPED | OUTPATIENT
Start: 2022-01-29 | End: 2022-01-29

## 2022-02-01 DIAGNOSIS — F33.1 MODERATE EPISODE OF RECURRENT MAJOR DEPRESSIVE DISORDER (H): ICD-10-CM

## 2022-02-01 DIAGNOSIS — F41.1 GAD (GENERALIZED ANXIETY DISORDER): ICD-10-CM

## 2022-02-01 RX ORDER — SERTRALINE HYDROCHLORIDE 100 MG/1
TABLET, FILM COATED ORAL
Qty: 90 TABLET | Refills: 0 | Status: SHIPPED | OUTPATIENT
Start: 2022-02-01 | End: 2024-04-18

## 2022-02-01 NOTE — TELEPHONE ENCOUNTER
Routing refill request to provider for review/approval because:   PHQ-9 score less than 5 in past 6 months  PHQ-9 score:    PHQ 9/10/2021   PHQ-9 Total Score 10   Q9: Thoughts of better off dead/self-harm past 2 weeks Not at all     Liz Caban RN

## 2022-02-15 DIAGNOSIS — I10 BENIGN ESSENTIAL HYPERTENSION: ICD-10-CM

## 2022-02-15 DIAGNOSIS — K21.9 GASTROESOPHAGEAL REFLUX DISEASE: ICD-10-CM

## 2022-02-15 RX ORDER — SPIRONOLACTONE 25 MG/1
TABLET ORAL
Qty: 90 TABLET | Refills: 0 | Status: SHIPPED | OUTPATIENT
Start: 2022-02-15 | End: 2022-05-11

## 2022-03-14 ENCOUNTER — OFFICE VISIT (OUTPATIENT)
Dept: DERMATOLOGY | Facility: CLINIC | Age: 72
End: 2022-03-14
Payer: COMMERCIAL

## 2022-03-14 VITALS — HEART RATE: 63 BPM | DIASTOLIC BLOOD PRESSURE: 80 MMHG | SYSTOLIC BLOOD PRESSURE: 141 MMHG

## 2022-03-14 DIAGNOSIS — L57.0 ACTINIC KERATOSIS: Primary | ICD-10-CM

## 2022-03-14 DIAGNOSIS — L57.8 SOLAR ELASTOSIS: ICD-10-CM

## 2022-03-14 DIAGNOSIS — C44.310 BCC (BASAL CELL CARCINOMA), FACE: ICD-10-CM

## 2022-03-14 DIAGNOSIS — L65.9 HYPOTRICHOSIS: ICD-10-CM

## 2022-03-14 DIAGNOSIS — M67.80 CYST OF TENDON SHEATH: ICD-10-CM

## 2022-03-14 PROCEDURE — 99213 OFFICE O/P EST LOW 20 MIN: CPT | Mod: 25 | Performed by: PHYSICIAN ASSISTANT

## 2022-03-14 PROCEDURE — 17000 DESTRUCT PREMALG LESION: CPT | Performed by: PHYSICIAN ASSISTANT

## 2022-03-14 RX ORDER — BIMATOPROST 0.3 MG/ML
SOLUTION/ DROPS OPHTHALMIC
Qty: 3 ML | Refills: 3 | Status: SHIPPED | OUTPATIENT
Start: 2022-03-14 | End: 2022-05-02

## 2022-03-14 NOTE — LETTER
3/14/2022         RE: Liz Haider  [address]  [facility] [address]        Dear Colleague,    Thank you for referring your patient, Liz Haider, to the [facility]. Please see a copy of my visit note below.    HPI:  Liz Haider is a 71 year old female patient here today for recheck ak on right mid helix LOV tx with ln2 with improvement. Pt notices some scale still .  Patient states this has been present for a while.  Patient reports the following symptoms: scaly .  Patient reports the following previous treatments: ln2.  Patient reports the following modifying factors: none.  Associated symptoms: none.  Patient has no other skin complaints today.  Remainder of the HPI, Meds, PMH, Allergies, FH, and SH was reviewed in chart.    Pertinent Hx:   BCC, AK  Past Medical History:   Diagnosis Date     Acid reflux disease      Benign essential hypertension      CAD (coronary artery disease) 2006    [facility]; acute NSTEMI, severe single vessel disease in RCA, PTCA/EUGENIO     VISHAL (generalized anxiety disorder)      History of alcohol abuse     sober since 1987     History of basal cell carcinoma     multiple around right eye     Impaired fasting glucose      MDD (major depressive disorder)      Microscopic hematuria     multiple, negative work-ups     Osteopenia        Past Surgical History:   Procedure Laterality Date     COLONOSCOPY  4/20/2012    Procedure:COLONOSCOPY; COLONOSCOPY; Surgeon:EDSON SHELLEY; Location: GI     DAVINCI RECTOPEXY N/A 10/31/2018    Procedure: ROBOTIC ASSISTED XI VENTRAL RECTOPEXY (DENIS) ;  Surgeon: Kirsten Huynh MD;  Location: [facility]     DAVINCI SACROCOLPOPEXY, CYSTOSCOPY, COMBINED N/A 10/31/2018    Procedure: ROBOTIC ASSISTED XI SACROCOLPOPEXY, CYSTOSCOPY (CEFERINO), BILATERAL SALPINGO-OORPHORECTOMY, MID URETHRAL SLING;  Surgeon: Turner Jonas MD;  Location: [facility] OR     DAVINCI SACROCOLPOPEXY, MIDURETHRAL SLING, CYSTOSCOPY   10/31/2018    Procedure: DAVINCI SACROCOLPOPEXY, MIDURETHRAL SLING, CYSTOSCOPY;  Surgeon: Turner Jonas MD;  Location: [facility] OR     DAVINCI SALPINGO-OOPHORECTOMY INCLUDING BILATERAL Bilateral 10/31/2018    Procedure: DAVINCI SALPINGO-OOPHORECTOMY INCLUDING BILATERAL;  Surgeon: Turner Jonas MD;  Location: [facility] OR     HYSTERECTOMY TOTAL ABDOMINAL  1988    for heavy periods     TONSILLECTOMY & ADENOIDECTOMY  1952        Family History   Problem Relation Age of Onset     Myocardial Infarction Father         later in life     Diabetes Type 2  Sister      Bladder Cancer Sister         smoking history     Dementia Sister         FTD     Aneurysm Sister         AAA     Cirrhosis Sister      Dementia Mother      Tongue cancer Brother      Cerebrovascular Disease No family hx of      Coronary Artery Disease Early Onset No family hx of      Breast Cancer No family hx of      Ovarian Cancer No family hx of      Colon Cancer No family hx of        Social History     Socioeconomic History     Marital status:      Spouse name: Not on file     Number of children: Not on file     Years of education: Not on file     Highest education level: Not on file   Occupational History     Occupation: Retired - [employer]   Tobacco Use     Smoking status: Former Smoker     Packs/day: 2.00     Years: 35.00     Pack years: 70.00     Types: Cigarettes     Quit date: 7/1/2006     Years since quitting: 15.7     Smokeless tobacco: Never Used   Substance and Sexual Activity     Alcohol use: No     Comment: history of alcohol abuse; sober since 1987     Drug use: No     Sexual activity: Not Currently   Other Topics Concern     Parent/sibling w/ CABG, MI or angioplasty before 65F 55M? No   Social History Narrative     (as of 2020).     No kids.    Walks daily, but no formal exercise.       Social Determinants of Health     Financial Resource Strain: Not on file   Food Insecurity: Not on file   Transportation  Needs: Not on file   Physical Activity: Not on file   Stress: Not on file   Social Connections: Not on file   Intimate Partner Violence: Not on file   Housing Stability: Not on file       Outpatient Encounter Medications as of 3/14/2022   Medication Sig Dispense Refill     amLODIPine (NORVASC) 10 MG tablet Take 0.5 tablets (5 mg) by mouth daily Take 1/2 (one-half) tablet by mouth once daily 45 tablet 1     ARIPiprazole (ABILIFY) 2 MG tablet Take 1 tablet (2 mg) by mouth daily 90 tablet 0     aspirin (ASA) 81 MG tablet Take 1 tablet (81 mg) by mouth daily       atenolol (TENORMIN) 100 MG tablet Take 1 tablet by mouth once daily 90 tablet 2     atorvastatin (LIPITOR) 40 MG tablet Take 1 tablet by mouth once daily 90 tablet 2     Biotin 1000 MCG CHEW Take 1,000 mcg by mouth daily       Calcium Carbonate-Vit D-Min (CALCIUM 1200) 6374-0100 MG-UNIT CHEW Take 1 chew tab by mouth daily       hydrocortisone (WESTCORT) 0.2 % external cream APPLY SPARINGLY TO AFFECTED AREA THREE TIMES DAILY FOR 14 DAYS 15 g 0     hydrOXYzine (ATARAX) 10 MG tablet Take 1-2 tablets (10-20 mg) by mouth 3 times daily as needed for anxiety 30 tablet 2     LATISSE 0.03 % external opthalmic solution Apply one drop to upper eyelid nightly. 3 mL 3     lisinopril (ZESTRIL) 20 MG tablet Take 1 tablet by mouth twice daily 180 tablet 0     LORazepam (ATIVAN) 0.5 MG tablet Take 1 tablet (0.5 mg) by mouth daily as needed for anxiety 110 tablet 0     omeprazole (PRILOSEC) 20 MG DR capsule Take 1 capsule by mouth once daily 90 capsule 0     sertraline (ZOLOFT) 100 MG tablet Take 1 tablet by mouth once daily 90 tablet 0     spironolactone (ALDACTONE) 25 MG tablet Take 1 tablet by mouth once daily 90 tablet 0     triamcinolone (KENALOG) 0.1 % external cream Apply topically 2 times daily as needed for irritation 30 g 3     vitamin D3 (CHOLECALCIFEROL) 1000 units (25 mcg) tablet Take 1 tablet (1,000 Units) by mouth daily       zolpidem (AMBIEN) 5 MG tablet TAKE 1  TABLET BY MOUTH AT BEDTIME 90 tablet 1     [DISCONTINUED] LATISSE 0.03 % external opthalmic solution APPLY 1 DROP TOPICALLY AT BEDTIME (Patient not taking: Reported on 3/14/2022) 3 mL 0     No facility-administered encounter medications on file as of 3/14/2022.       Review Of Systems:  Skin: ak  Eyes: negative  Ears/Nose/Throat: negative  Respiratory: No shortness of breath, dyspnea on exertion, cough, or hemoptysis  Cardiovascular: negative  Gastrointestinal: negative  Genitourinary: negative  Musculoskeletal: negative  Neurologic: negative  Psychiatric: negative  Hematologic/Lymphatic/Immunologic: negative  Endocrine: negative      Objective:     BP (!) 141/80   Pulse 63   Eyes: Conjunctivae/lids: Normal   ENT: Lips:  Normal  MSK: Normal  Cardiovascular: Peripheral edema none  Pulm: Breathing Normal  Neuro/Psych: Orientation: A/O x 3. Normal; Mood/Affect: Normal, NAD, WDWN  Pt accompanied by: self  Following areas examined: face, neck, hands, ears  Richards skin type:ii   Findings:  Pink gritty macule/s on right mid helix  Rhytides, hypo/hyperpigmentation, and atrophy  Firm smooth mobile nodule on right 4th palmar digit    Assessment and Plan:     1) Actinic keratoses x 1 and solar elastosis  Due to chronic sun exposure.    LN2 for 5 seconds x 2. Discussed AE include hypopigmentation (white spot) and recurrence. Follow up in 2-3 months to recheck lesions. There is a risk of AKs developing into a SCC.   Treatment options include LN2 vs PDT vs Efudex. Pt elected LN2    2) hypotrichosis  Continue bimatoprost  Refills sent    3) tendon sheath cyst?  New x 2-3 weeks per pt  Not bothersome  Watch and monitor-disc excision-would refer to hand surgeon if growing or bothersome    4) BCC of medial canthus     No evidence of recurrence  Reviewed pertinent charts and labs prior to office visit.   Signs and Symptoms of non-melanoma skin cancer and ABCDEs of melanoma reviewed with patient. Patient encouraged to perform  monthly self skin exams and educated on how to perform them. UV precautions reviewed with patient. Patient was asked about new or changing moles/lesions on body.   Wear a sunscreen with at least SPF 30 on your face, ears, neck and V of the chest daily. Wear sunscreen on other areas of the body if those areas are exposed to the sun throughout the day. Sunscreens can contain physical and/or chemical blockers. Physical blockers are less likely to clog pores, these include zinc oxide and titanium dioxide. Reapply every two hour and after swimming.Sunscreen examples: https://www.ewg.org/sunscreen/    Proper skin care from [facility]:    -Eliminate harsh soaps as they strip the natural oils from the skin, often resulting in dry itchy skin ( i.e. Dial, Zest, [place])  -Use mild soaps such as Cetaphil or Dove Sensitive Skin in the shower. You do not need to use soap on arms, legs, and trunk every time you shower unless visibly soiled.   -Avoid hot or cold showers.  -After showering, lightly dry off and apply moisturizing within 2-3 minutes. This will help trap moisture in the skin.   -Aggressive use of a moisturizer at least 1-2 times a day to the entire body (including -Vanicream, Cetaphil, Aquaphor or Cerave) and moisturize hands after every washing.  -We recommend using moisturizers that come in a tub that needs to be scooped out, not a pump. This has more of an oil base. It will hold moisture in your skin much better than a water base moisturizer. The above recommended are non-pore clogging.         It was a pleasure speaking with Liz Haider today.       Follow up in yearly FBE. Sooner if ak not resolved        Again, thank you for allowing me to participate in the care of your patient.        Sincerely,        Raina Diego PA-C

## 2022-03-14 NOTE — PROGRESS NOTES
HPI:  Liz Haider is a 71 year old female patient here today for recheck ak on right mid helix LOV tx with ln2 with improvement. Pt notices some scale still .  Patient states this has been present for a while.  Patient reports the following symptoms: scaly .  Patient reports the following previous treatments: ln2.  Patient reports the following modifying factors: none.  Associated symptoms: none.  Patient has no other skin complaints today.  Remainder of the HPI, Meds, PMH, Allergies, FH, and SH was reviewed in chart.    Pertinent Hx:   BCC, AK  Past Medical History:   Diagnosis Date     Acid reflux disease      Benign essential hypertension      CAD (coronary artery disease) 2006    [facility]; acute NSTEMI, severe single vessel disease in RCA, PTCA/EUGENIO     VISHAL (generalized anxiety disorder)      History of alcohol abuse     sober since 1987     History of basal cell carcinoma     multiple around right eye     Impaired fasting glucose      MDD (major depressive disorder)      Microscopic hematuria     multiple, negative work-ups     Osteopenia        Past Surgical History:   Procedure Laterality Date     COLONOSCOPY  4/20/2012    Procedure:COLONOSCOPY; COLONOSCOPY; Surgeon:EDSON SHELLEY; Location: GI     DAVINCI RECTOPEXY N/A 10/31/2018    Procedure: ROBOTIC ASSISTED XI VENTRAL RECTOPEXY (EDNIS) ;  Surgeon: Kirsten Huynh MD;  Location: [facility]     DAVINCI SACROCOLPOPEXY, CYSTOSCOPY, COMBINED N/A 10/31/2018    Procedure: ROBOTIC ASSISTED XI SACROCOLPOPEXY, CYSTOSCOPY (CEFERINO), BILATERAL SALPINGO-OORPHORECTOMY, MID URETHRAL SLING;  Surgeon: Turner Jonas MD;  Location: [facility]     DAVINCI SACROCOLPOPEXY, MIDURETHRAL SLING, CYSTOSCOPY  10/31/2018    Procedure: DAVINCI SACROCOLPOPEXY, MIDURETHRAL SLING, CYSTOSCOPY;  Surgeon: Turner Jonas MD;  Location: [facility]     DAVINCI SALPINGO-OOPHORECTOMY INCLUDING BILATERAL Bilateral 10/31/2018    Procedure: DAVINCI SALPINGO-OOPHORECTOMY INCLUDING  BILATERAL;  Surgeon: Turner Jonas MD;  Location: [facility]     HYSTERECTOMY TOTAL ABDOMINAL  1988    for heavy periods     TONSILLECTOMY & ADENOIDECTOMY  1952        Family History   Problem Relation Age of Onset     Myocardial Infarction Father         later in life     Diabetes Type 2  Sister      Bladder Cancer Sister         smoking history     Dementia Sister         FTD     Aneurysm Sister         AAA     Cirrhosis Sister      Dementia Mother      Tongue cancer Brother      Cerebrovascular Disease No family hx of      Coronary Artery Disease Early Onset No family hx of      Breast Cancer No family hx of      Ovarian Cancer No family hx of      Colon Cancer No family hx of        Social History     Socioeconomic History     Marital status:      Spouse name: Not on file     Number of children: Not on file     Years of education: Not on file     Highest education level: Not on file   Occupational History     Occupation: Retired - [place]//   Tobacco Use     Smoking status: Former Smoker     Packs/day: 2.00     Years: 35.00     Pack years: 70.00     Types: Cigarettes     Quit date: 7/1/2006     Years since quitting: 15.7     Smokeless tobacco: Never Used   Substance and Sexual Activity     Alcohol use: No     Comment: history of alcohol abuse; sober since 1987     Drug use: No     Sexual activity: Not Currently   Other Topics Concern     Parent/sibling w/ CABG, MI or angioplasty before 65F 55M? No   Social History Narrative     (as of 2020).     No kids.    Walks daily, but no formal exercise.       Social Determinants of Health     Financial Resource Strain: Not on file   Food Insecurity: Not on file   Transportation Needs: Not on file   Physical Activity: Not on file   Stress: Not on file   Social Connections: Not on file   Intimate Partner Violence: Not on file   Housing Stability: Not on file       Outpatient Encounter Medications as of 3/14/2022   Medication Sig  Dispense Refill     amLODIPine (NORVASC) 10 MG tablet Take 0.5 tablets (5 mg) by mouth daily Take 1/2 (one-half) tablet by mouth once daily 45 tablet 1     ARIPiprazole (ABILIFY) 2 MG tablet Take 1 tablet (2 mg) by mouth daily 90 tablet 0     aspirin (ASA) 81 MG tablet Take 1 tablet (81 mg) by mouth daily       atenolol (TENORMIN) 100 MG tablet Take 1 tablet by mouth once daily 90 tablet 2     atorvastatin (LIPITOR) 40 MG tablet Take 1 tablet by mouth once daily 90 tablet 2     Biotin 1000 MCG CHEW Take 1,000 mcg by mouth daily       Calcium Carbonate-Vit D-Min (CALCIUM 1200) 4137-0660 MG-UNIT CHEW Take 1 chew tab by mouth daily       hydrocortisone (WESTCORT) 0.2 % external cream APPLY SPARINGLY TO AFFECTED AREA THREE TIMES DAILY FOR 14 DAYS 15 g 0     hydrOXYzine (ATARAX) 10 MG tablet Take 1-2 tablets (10-20 mg) by mouth 3 times daily as needed for anxiety 30 tablet 2     LATISSE 0.03 % external opthalmic solution Apply one drop to upper eyelid nightly. 3 mL 3     lisinopril (ZESTRIL) 20 MG tablet Take 1 tablet by mouth twice daily 180 tablet 0     LORazepam (ATIVAN) 0.5 MG tablet Take 1 tablet (0.5 mg) by mouth daily as needed for anxiety 110 tablet 0     omeprazole (PRILOSEC) 20 MG DR capsule Take 1 capsule by mouth once daily 90 capsule 0     sertraline (ZOLOFT) 100 MG tablet Take 1 tablet by mouth once daily 90 tablet 0     spironolactone (ALDACTONE) 25 MG tablet Take 1 tablet by mouth once daily 90 tablet 0     triamcinolone (KENALOG) 0.1 % external cream Apply topically 2 times daily as needed for irritation 30 g 3     vitamin D3 (CHOLECALCIFEROL) 1000 units (25 mcg) tablet Take 1 tablet (1,000 Units) by mouth daily       zolpidem (AMBIEN) 5 MG tablet TAKE 1 TABLET BY MOUTH AT BEDTIME 90 tablet 1     [DISCONTINUED] LATISSE 0.03 % external opthalmic solution APPLY 1 DROP TOPICALLY AT BEDTIME (Patient not taking: Reported on 3/14/2022) 3 mL 0     No facility-administered encounter medications on file as of  3/14/2022.       Review Of Systems:  Skin: ak  Eyes: negative  Ears/Nose/Throat: negative  Respiratory: No shortness of breath, dyspnea on exertion, cough, or hemoptysis  Cardiovascular: negative  Gastrointestinal: negative  Genitourinary: negative  Musculoskeletal: negative  Neurologic: negative  Psychiatric: negative  Hematologic/Lymphatic/Immunologic: negative  Endocrine: negative      Objective:     BP (!) 141/80   Pulse 63   Eyes: Conjunctivae/lids: Normal   ENT: Lips:  Normal  MSK: Normal  Cardiovascular: Peripheral edema none  Pulm: Breathing Normal  Neuro/Psych: Orientation: A/O x 3. Normal; Mood/Affect: Normal, NAD, WDWN  Pt accompanied by: self  Following areas examined: face, neck, hands, ears  Richards skin type:ii   Findings:  Pink gritty macule/s on right mid helix  Rhytides, hypo/hyperpigmentation, and atrophy  Firm smooth mobile nodule on right 4th palmar digit    Assessment and Plan:     1) Actinic keratoses x 1 and solar elastosis  Due to chronic sun exposure.    LN2 for 5 seconds x 2. Discussed AE include hypopigmentation (white spot) and recurrence. Follow up in 2-3 months to recheck lesions. There is a risk of AKs developing into a SCC.   Treatment options include LN2 vs PDT vs Efudex. Pt elected LN2    2) hypotrichosis  Continue bimatoprost  Refills sent    3) tendon sheath cyst?  New x 2-3 weeks per pt  Not bothersome  Watch and monitor-disc excision-would refer to hand surgeon if growing or bothersome    4) BCC of medial canthus     No evidence of recurrence  Reviewed pertinent charts and labs prior to office visit.   Signs and Symptoms of non-melanoma skin cancer and ABCDEs of melanoma reviewed with patient. Patient encouraged to perform monthly self skin exams and educated on how to perform them. UV precautions reviewed with patient. Patient was asked about new or changing moles/lesions on body.   Wear a sunscreen with at least SPF 30 on your face, ears, neck and V of the chest daily.  Wear sunscreen on other areas of the body if those areas are exposed to the sun throughout the day. Sunscreens can contain physical and/or chemical blockers. Physical blockers are less likely to clog pores, these include zinc oxide and titanium dioxide. Reapply every two hour and after swimming.Sunscreen examples: https://www.ewg.org/sunscreen/    Proper skin care from [facility]:    -Eliminate harsh soaps as they strip the natural oils from the skin, often resulting in dry itchy skin ( i.e. Dial, Zest, [place]yl[place] Spring)  -Use mild soaps such as Cetaphil or Dove Sensitive Skin in the shower. You do not need to use soap on arms, legs, and trunk every time you shower unless visibly soiled.   -Avoid hot or cold showers.  -After showering, lightly dry off and apply moisturizing within 2-3 minutes. This will help trap moisture in the skin.   -Aggressive use of a moisturizer at least 1-2 times a day to the entire body (including -Vanicream, Cetaphil, Aquaphor or Cerave) and moisturize hands after every washing.  -We recommend using moisturizers that come in a tub that needs to be scooped out, not a pump. This has more of an oil base. It will hold moisture in your skin much better than a water base moisturizer. The above recommended are non-pore clogging.         It was a pleasure speaking with Liz Haider today.       Follow up in yearly FBE. Sooner if ak not resolved

## 2022-03-14 NOTE — PATIENT INSTRUCTIONS
WOUND CARE INSTRUCTIONS  FOR CRYOSURGERY  For questions please call [phone]        This area treated with liquid nitrogen will form a blister. You do not need to bandage the area until after the blister forms and breaks (which may be a few days).  When the blister breaks, begin daily dressing changes as follows:    1) Clean and dry the area with tap water using clean Q-tip or sterile gauze pad.    2) Apply Vaseline or Aquaphor over entire wound. Other options include Polysporin ointment or Bacitracin ointment over entire wound.  Do NOT use Neosporin ointment.    3) Cover the wound with a band-aid or sterile non-stick gauze pad and micropore paper tape.      REPEAT THESE INSTRUCTIONS AT LEAST ONCE A DAY UNTIL THE WOUND HAS COMPLETELY HEALED.        It is an old wives tale that a wound heals better when it is exposed to air and allowed to dry out. The wound will heal faster with a better cosmetic result if it is kept moist with ointment and covered with a bandage.  Do not let the wound dry out.      Supplies Needed:     *Cotton tipped applicators (Q-tips)   *Polysporin ointment or Bacitracin ointment (NOT NEOSPORIN)   *Band-aids, or non stick gauze pads and micropore paper tape    PATIENT INFORMATION    During the healing process you will notice a number of changes. All wounds develop a small halo of redness surrounding the wound.  This means healing is occurring. Severe itching with extensive redness usually indicates sensitivity to the ointment or bandage tape used to dress the wound.  You should call our office if this develops.      Swelling and/or discoloration around your surgical site is common, particularly when performed around the eye.    All wounds normally drain.  The larger the wound the more drainage there will be.  After 7-10 days, you will notice the wound beginning to shrink and new skin will begin to grow.  The wound is healed when you can see skin has formed over the entire area.  A healed  wound has a healthy, shiny look to the surface and is red to dark pink in color to normalize.  Wounds may take approximately 4-6 weeks to heal.  Larger wounds may take 6-8 weeks.  After the wound is healed you may discontinue dressing changes.    You may experience a sensation of tightness as your wound heals. This is normal and will gradually subside.    Your healed wound may be sensitive to temperature changes. This sensitivity improves with time, but if you re having a lot of discomfort, try to avoid temperature extremes.    Patients frequently experience itching after their wound appears to have healed because of the continue healing under the skin.  Plain Vaseline will help relieve the itching.         Proper skin care from [facility]:    -Eliminate harsh soaps as they strip the natural oils from the skin, often resulting in dry itchy skin ( i.e. Dial, Zest, [place])  -Use mild soaps such as Cetaphil or Dove Sensitive Skin in the shower. You do not need to use soap on arms, legs, and trunk every time you shower unless visibly soiled.   -Avoid hot or cold showers.  -After showering, lightly dry off and apply moisturizing within 2-3 minutes. This will help trap moisture in the skin.   -Aggressive use of a moisturizer at least 1-2 times a day to the entire body (including -Vanicream, Cetaphil, Aquaphor or Cerave) and moisturize hands after every washing.  -We recommend using moisturizers that come in a tub that needs to be scooped out, not a pump. This has more of an oil base. It will hold moisture in your skin much better than a water base moisturizer. The above recommended are non-pore clogging.      Wear a sunscreen with at least SPF 30 on your face, ears, neck and V of the chest daily. Wear sunscreen on other areas of the body if those areas are exposed to the sun throughout the day. Sunscreens can contain physical and/or chemical blockers. Physical blockers are less likely to clog pores, these  include zinc oxide and titanium dioxide. Reapply every two hour and after swimming.     Sunscreen examples: https://www.ewg.org/sunscreen/    UV radiation  UVA radiation remains constant throughout the day and throughout the year. It is a longer wavelength than UVB and therefore penetrates deeper into the skin leading to immediate and delayed tanning, photoaging, and skin cancer. 70-80% of UVA and UVB radiation occurs between the hours of 10am-2pm.  UVB radiation  UVB radiation causes the most harmful effects and is more significant during the summer months. However, snow and ice can reflect UVB radiation leading to skin damage during the winter months as well. UVB radiation is responsible for tanning, burning, inflammation, delayed erythema (pinkness), pigmentation (brown spots), and skin cancer.     I recommend self monthly full body exams and yearly full body exams with a dermatology provider. If you develop a new or changing lesion please follow up for examination. Most skin cancers are pink and scaly or pink and pearly. However, we do see blue/brown/black skin cancers.  Consider the ABCDEs of melanoma when giving yourself your monthly full body exam ( don't forget the groin, buttocks, feet, toes, etc). A-asymmetry, B-borders, C-color, D-diameter, E-elevation or evolving. If you see any of these changes please follow up in clinic. If you cannot see your back I recommend purchasing a hand held mirror to use with a larger wall mirror.

## 2022-04-21 ENCOUNTER — APPOINTMENT (OUTPATIENT)
Dept: CT IMAGING | Facility: CLINIC | Age: 72
End: 2022-04-21
Attending: EMERGENCY MEDICINE
Payer: COMMERCIAL

## 2022-04-21 ENCOUNTER — HOSPITAL ENCOUNTER (EMERGENCY)
Facility: CLINIC | Age: 72
Discharge: HOME | End: 2022-04-21
Attending: EMERGENCY MEDICINE | Admitting: EMERGENCY MEDICINE
Payer: COMMERCIAL

## 2022-04-21 VITALS
HEART RATE: 65 BPM | DIASTOLIC BLOOD PRESSURE: 74 MMHG | OXYGEN SATURATION: 100 % | SYSTOLIC BLOOD PRESSURE: 151 MMHG | RESPIRATION RATE: 22 BRPM | TEMPERATURE: 98.6 F

## 2022-04-21 DIAGNOSIS — F41.1 GAD (GENERALIZED ANXIETY DISORDER): ICD-10-CM

## 2022-04-21 DIAGNOSIS — R61 DIAPHORESIS: ICD-10-CM

## 2022-04-21 DIAGNOSIS — Z86.79 HISTORY OF CORONARY ARTERY DISEASE: ICD-10-CM

## 2022-04-21 DIAGNOSIS — R42 EPISODE OF DIZZINESS: ICD-10-CM

## 2022-04-21 LAB
ALBUMIN UR-MCNC: NEGATIVE MG/DL
ANION GAP SERPL CALCULATED.3IONS-SCNC: 7 MMOL/L (ref 3–14)
APPEARANCE UR: CLEAR
ATRIAL RATE - MUSE: 70 BPM
BASOPHILS # BLD AUTO: 0.1 10E3/UL (ref 0–0.2)
BASOPHILS NFR BLD AUTO: 0 %
BILIRUB UR QL STRIP: NEGATIVE
BUN SERPL-MCNC: 19 MG/DL (ref 7–30)
CALCIUM SERPL-MCNC: 9.2 MG/DL (ref 8.5–10.1)
CHLORIDE BLD-SCNC: 99 MMOL/L (ref 94–109)
CO2 SERPL-SCNC: 25 MMOL/L (ref 20–32)
COLOR UR AUTO: ABNORMAL
CREAT BLD-MCNC: 1.1 MG/DL (ref 0.5–1)
CREAT SERPL-MCNC: 0.98 MG/DL (ref 0.52–1.04)
DIASTOLIC BLOOD PRESSURE - MUSE: NORMAL MMHG
EOSINOPHIL # BLD AUTO: 0 10E3/UL (ref 0–0.7)
EOSINOPHIL NFR BLD AUTO: 0 %
ERYTHROCYTE [DISTWIDTH] IN BLOOD BY AUTOMATED COUNT: 12.9 % (ref 10–15)
GFR SERPL CREATININE-BSD FRML MDRD: 53 ML/MIN/1.73M2
GFR SERPL CREATININE-BSD FRML MDRD: 61 ML/MIN/1.73M2
GLUCOSE BLD-MCNC: 122 MG/DL (ref 70–99)
GLUCOSE UR STRIP-MCNC: NEGATIVE MG/DL
HCT VFR BLD AUTO: 41.5 % (ref 35–47)
HGB BLD-MCNC: 13.4 G/DL (ref 11.7–15.7)
HGB UR QL STRIP: ABNORMAL
HOLD SPECIMEN: NORMAL
HOLD SPECIMEN: NORMAL
IMM GRANULOCYTES # BLD: 0.1 10E3/UL
IMM GRANULOCYTES NFR BLD: 0 %
INTERPRETATION ECG - MUSE: NORMAL
KETONES UR STRIP-MCNC: NEGATIVE MG/DL
LEUKOCYTE ESTERASE UR QL STRIP: NEGATIVE
LYMPHOCYTES # BLD AUTO: 1.3 10E3/UL (ref 0.8–5.3)
LYMPHOCYTES NFR BLD AUTO: 11 %
MCH RBC QN AUTO: 30.1 PG (ref 26.5–33)
MCHC RBC AUTO-ENTMCNC: 32.3 G/DL (ref 31.5–36.5)
MCV RBC AUTO: 93 FL (ref 78–100)
MONOCYTES # BLD AUTO: 0.6 10E3/UL (ref 0–1.3)
MONOCYTES NFR BLD AUTO: 5 %
NEUTROPHILS # BLD AUTO: 9.5 10E3/UL (ref 1.6–8.3)
NEUTROPHILS NFR BLD AUTO: 84 %
NITRATE UR QL: NEGATIVE
NRBC # BLD AUTO: 0 10E3/UL
NRBC BLD AUTO-RTO: 0 /100
P AXIS - MUSE: 70 DEGREES
PH UR STRIP: 6 [PH] (ref 5–7)
PLATELET # BLD AUTO: 235 10E3/UL (ref 150–450)
POTASSIUM BLD-SCNC: 4 MMOL/L (ref 3.4–5.3)
PR INTERVAL - MUSE: 194 MS
QRS DURATION - MUSE: 74 MS
QT - MUSE: 396 MS
QTC - MUSE: 427 MS
R AXIS - MUSE: 16 DEGREES
RBC # BLD AUTO: 4.45 10E6/UL (ref 3.8–5.2)
RBC URINE: 2 /HPF
SODIUM SERPL-SCNC: 131 MMOL/L (ref 133–144)
SP GR UR STRIP: 1.02 (ref 1–1.03)
SYSTOLIC BLOOD PRESSURE - MUSE: NORMAL MMHG
T AXIS - MUSE: 61 DEGREES
TROPONIN I SERPL HS-MCNC: 4 NG/L
UROBILINOGEN UR STRIP-MCNC: NORMAL MG/DL
VENTRICULAR RATE- MUSE: 70 BPM
WBC # BLD AUTO: 11.4 10E3/UL (ref 4–11)
WBC URINE: 1 /HPF

## 2022-04-21 PROCEDURE — 85004 AUTOMATED DIFF WBC COUNT: CPT | Performed by: EMERGENCY MEDICINE

## 2022-04-21 PROCEDURE — 80048 BASIC METABOLIC PNL TOTAL CA: CPT | Performed by: EMERGENCY MEDICINE

## 2022-04-21 PROCEDURE — 250N000009 HC RX 250: Performed by: EMERGENCY MEDICINE

## 2022-04-21 PROCEDURE — 96361 HYDRATE IV INFUSION ADD-ON: CPT

## 2022-04-21 PROCEDURE — 84484 ASSAY OF TROPONIN QUANT: CPT | Performed by: EMERGENCY MEDICINE

## 2022-04-21 PROCEDURE — 36415 COLL VENOUS BLD VENIPUNCTURE: CPT | Performed by: EMERGENCY MEDICINE

## 2022-04-21 PROCEDURE — 81001 URINALYSIS AUTO W/SCOPE: CPT | Performed by: EMERGENCY MEDICINE

## 2022-04-21 PROCEDURE — 258N000003 HC RX IP 258 OP 636: Performed by: EMERGENCY MEDICINE

## 2022-04-21 PROCEDURE — 70450 CT HEAD/BRAIN W/O DYE: CPT

## 2022-04-21 PROCEDURE — 99285 EMERGENCY DEPT VISIT HI MDM: CPT | Mod: 25

## 2022-04-21 PROCEDURE — 96360 HYDRATION IV INFUSION INIT: CPT | Mod: 59

## 2022-04-21 PROCEDURE — 82565 ASSAY OF CREATININE: CPT

## 2022-04-21 PROCEDURE — 93005 ELECTROCARDIOGRAM TRACING: CPT

## 2022-04-21 PROCEDURE — 250N000011 HC RX IP 250 OP 636: Performed by: EMERGENCY MEDICINE

## 2022-04-21 PROCEDURE — 70496 CT ANGIOGRAPHY HEAD: CPT

## 2022-04-21 RX ORDER — IOPAMIDOL 755 MG/ML
500 INJECTION, SOLUTION INTRAVASCULAR ONCE
Status: COMPLETED | OUTPATIENT
Start: 2022-04-21 | End: 2022-04-21

## 2022-04-21 RX ORDER — LORAZEPAM 0.5 MG/1
TABLET ORAL
Qty: 20 TABLET | Refills: 0 | OUTPATIENT
Start: 2022-04-21

## 2022-04-21 RX ADMIN — SODIUM CHLORIDE 1000 ML: 9 INJECTION, SOLUTION INTRAVENOUS at 12:26

## 2022-04-21 RX ADMIN — SODIUM CHLORIDE 80 ML: 9 INJECTION, SOLUTION INTRAVENOUS at 13:08

## 2022-04-21 RX ADMIN — IOPAMIDOL 75 ML: 755 INJECTION, SOLUTION INTRAVENOUS at 13:08

## 2022-04-21 ASSESSMENT — ENCOUNTER SYMPTOMS
DIAPHORESIS: 1
DIZZINESS: 1
SHORTNESS OF BREATH: 0
COUGH: 0
VOMITING: 0
FEVER: 0
DIARRHEA: 0

## 2022-04-21 NOTE — ED TRIAGE NOTES
Intermittent dizziness and some spasms in the arms. Intermittent for a couple weeks, usually occurs in the morning. Reports a lot of stress in her life currently . Denies CP or SOB.

## 2022-04-21 NOTE — TELEPHONE ENCOUNTER
This refill request is a duplicate previously sent to pharmacy or currently in review.  Refused medication to pharmacy as duplicate.   Ila Silverman RN

## 2022-04-21 NOTE — ED NOTES
Pt ambulated to bathroom with stand by assist. Pt with steady gait. Denies dizziness at this time.

## 2022-04-21 NOTE — ED PROVIDER NOTES
"  History   Chief Complaint:  Dizziness       The history is provided by the patient.      Liz Haider is a 71 year old female with history of acute MI, anxiety, CAD, hyperlipidemia, hypertension, and obesity who presents with dizziness. The patient reports that she experienced 3 episodes of dizziness over the past 1.5 weeks, most recently this morning. She states that these episodes may be panic attacks, during which she is reportedly more unsteady than baseline. The patient also notes a transient episode of diaphoresis while vacuuming yesterday. She reportedly took \"a whole bunch\" of baby aspirin this morning, which helped. Of note, the patient endorses similar symptoms with a past MI about 15 years ago, at which time she had a stent placed. She reports a history of IBS. She denies a history of diabetes. She states that she is currently on aspirin. She additionally notes recent stressors, including her brother and  passing away, as well as her sister residing in assisted living. The patient reports no chest pain associated with her episodes of dizziness. At this time, she denies fever, cough, vomiting, or diarrhea. She additionally endorses no shortness of breath.    Review of Systems   Constitutional: Positive for diaphoresis. Negative for fever.   Respiratory: Negative for cough and shortness of breath.    Cardiovascular: Negative for chest pain.   Gastrointestinal: Negative for diarrhea and vomiting.   Neurological: Positive for dizziness.   All other systems reviewed and are negative.    Allergies:  Penicillins  Sulfa Drugs    Medications:  Amlodipine  Abilify  Aspirin  Atenolol  Atorvastatin  Atarax  Lisinopril  Ativan  Prilosec  Zoloft  Aldactone  Cholecalciferol  Ambien    Past Medical History:     Acid reflux disease  Acute MI  Anxiety state  Basal cell carcinoma  CAD  Depression   VISHAL  Hyperlipidemia   Hypertension  Insomnia   Obesity   Osteopenia      Past Surgical History:  "   Colonoscopy  DaVinci rectopexy  DaVinci sacrocolpopexy and cystoscopy, with BSO and mid-urethral sling  ALEX  Tonsillectomy and adenoidectomy     Family History:    Father: MI, alcohol/drug abuse  Mother: Dementia, alcohol/drug abuse, schizophrenia, depression  Brother: Tongue cancer, alcohol/drug abuse  Sister: Type 2 diabetes, bladder cancer, dementia, AAA, cirrhosis, alcohol/drug abuse    Social History:  Presents to the emergency department alone   Arrives via car   History of alcohol abuse  History of tobacco use disorder    Physical Exam     Patient Vitals for the past 24 hrs:   BP Temp Temp src Pulse Resp SpO2   04/21/22 1430 (!) 151/74 -- -- 65 22 100 %   04/21/22 1300 137/68 -- -- 60 24 100 %   04/21/22 1245 135/66 -- -- 64 22 100 %   04/21/22 1230 132/64 -- -- 64 23 97 %   04/21/22 1215 108/81 -- -- 80 -- 99 %   04/21/22 1136 (!) 163/80 98.6  F (37  C) Temporal 78 18 97 %     Orthostatics:  Supine: /65, HR 72  Sitting: /78, HR 71  Standing: /81, HR 80    Physical Exam  VS: Reviewed per above  HENT: Mucous membranes moist, no nuchal rigidity  EYES: sclera anicteric  CV: Rate as noted, regular rhythm.   RESP: Effort normal. Breath sounds are normal bilaterally.  GI: no tenderness/rebound/guarding, not distended.  NEURO: GCS 15, cranial nerves II through XII are intact, 5 out of 5 strength in all 4 extremities, sensation is intact light touch in all 4 extremities.  No ataxia.  Normal finger-nose-finger and heel shin testing bilaterally.  MSK: No deformity of the extremities  SKIN: Warm and dry        Emergency Department Course   ECG  ECG obtained at 1159, ECG read at 1201  Normal sinus rhythm    Rate 70 bpm. OR interval 194 ms. QRS duration 74 ms. QT/QTc 396/427 ms. P-R-T axes 70 16 61.     Imaging:  CTA Head Neck with Contrast   Preliminary Result   IMPRESSION:    CTA Head:    1. No evidence of large vessel occlusion or high-grade stenosis.   2. Incidental 1 to 2 mm outpouching at the  right middle cerebral   artery bifurcation which could represent aneurysm or infundibulum.    CTA Neck:    1. Approximately 30-40% stenosis of the proximal right internal   carotid artery.   2. No evidence of large vessel occlusion or high-grade stenosis.       Head CT w/o contrast   Final Result   IMPRESSION:    No acute intracranial abnormality.      SREEKANTH WEBB MD            SYSTEM ID:  [id number]        Report per radiology    Laboratory:  Labs Ordered and Resulted from Time of ED Arrival to Time of ED Departure   BASIC METABOLIC PANEL - Abnormal       Result Value    Sodium 131 (*)     Potassium 4.0      Chloride 99      Carbon Dioxide (CO2) 25      Anion Gap 7      Urea Nitrogen 19      Creatinine 0.98      Calcium 9.2      Glucose 122 (*)     GFR Estimate 61     ROUTINE UA WITH MICROSCOPIC REFLEX TO CULTURE - Abnormal    Color Urine Straw      Appearance Urine Clear      Glucose Urine Negative      Bilirubin Urine Negative      Ketones Urine Negative      Specific Gravity Urine 1.017      Blood Urine Trace (*)     pH Urine 6.0      Protein Albumin Urine Negative      Urobilinogen Urine Normal      Nitrite Urine Negative      Leukocyte Esterase Urine Negative      RBC Urine 2      WBC Urine 1     CBC WITH PLATELETS AND DIFFERENTIAL - Abnormal    WBC Count 11.4 (*)     RBC Count 4.45      Hemoglobin 13.4      Hematocrit 41.5      MCV 93      MCH 30.1      MCHC 32.3      RDW 12.9      Platelet Count 235      % Neutrophils 84      % Lymphocytes 11      % Monocytes 5      % Eosinophils 0      % Basophils 0      % Immature Granulocytes 0      NRBCs per 100 WBC 0      Absolute Neutrophils 9.5 (*)     Absolute Lymphocytes 1.3      Absolute Monocytes 0.6      Absolute Eosinophils 0.0      Absolute Basophils 0.1      Absolute Immature Granulocytes 0.1      Absolute NRBCs 0.0     ISTAT CREATININE POCT - Abnormal    Creatinine POCT 1.1 (*)     GFR, ESTIMATED POCT 53 (*)    TROPONIN I - Normal    Troponin I High  Sensitivity 4          Emergency Department Course:     Reviewed:  I reviewed nursing notes, vitals, past medical history and Care Everywhere    Assessments:  1212 I obtained history and examined the patient as noted above.   1433 I rechecked the patient and explained findings.  1522 I rechecked the patient once more.     Interventions:  1226 NS bolus 1L IV    Disposition:  The patient was discharged to home.     Impression & Plan     CMS Diagnoses: None    Medical Decision Making:  Patient presents to the ER for evaluation of episodic dizzy spells, episode of diaphoresis while vacuuming yesterday.  On arrival vital signs are reassuring.  On exam I do not appreciate focal neurological deficits.  Patient is asymptomatic in the ER.  CT of the head and CTA of the head and neck did not show acute intracranial pathology.  There is no posterior circulation stenosis to explain her dizzy spells.  Lack of ongoing symptoms speaks against occult CVA.  Incidental right MCA infundibulum versus aneurysm was relayed to patient with recommendation for primary care versus neuro IR follow-up.  EKG does not have specific ischemic change and a single troponin is negative.  Lower suspicion for ACS but given history of CAD, I will order an outpatient stress test.  Patient was given IV fluids for mild hyponatremia.  No evidence of UTI.  Encouraged close primary care follow up.  Return precautions discussed prior to discharge.    Diagnosis:    ICD-10-CM    1. Episode of dizziness  R42 NM Lexiscan stress test   2. History of coronary artery disease  Z86.79 NM Lexiscan stress test   3. Diaphoresis  R61 NM Lexiscan stress test       Discharge Medications:  New Prescriptions    No medications on file       Scribe Disclosure:  I, Joe Jones, am serving as a scribe at 11:57 AM on 4/21/2022 to document services personally performed by David Arellano MD based on my observations and the provider's statements to me.        David Arellano,  MD  04/21/22 [id number]

## 2022-04-22 ENCOUNTER — PATIENT OUTREACH (OUTPATIENT)
Dept: INTERNAL MEDICINE | Facility: CLINIC | Age: 72
End: 2022-04-22
Payer: COMMERCIAL

## 2022-04-22 NOTE — TELEPHONE ENCOUNTER
What type of discharge? Emergency Department  Risk of Hospital admission or ED visit: 52%  Is a TCM episode required? No  When should the patient follow up with PCP? within 30 days of discharge.    Liz Caban RN  [facility]

## 2022-04-22 NOTE — TELEPHONE ENCOUNTER
"  ED for acute condition Discharge Protocol    \"Hi, my name is Girma Ramirez RN, a registered nurse, and I am calling from [facility].  I am calling to follow up and see how things are going for you after your recent emergency visit.\"    Tell me how you are doing now that you are home?\" doing better       Discharge Instructions    \"Let's review your discharge instructions.  What is/are the follow-up recommendations?  Pt. Response: see Dr. Miller to see if she needs to see a neruologist    \"Has an appointment with your primary care provider been scheduled?\"  No (needed - schedule appointment and remind to bring meds)   Patient would not like appointment currently    Medications    \"Tell me what changed about your medicines when you discharged?\"    none    \"What questions do you have about your medications?\"   None        Call Summary    \"What questions or concerns do you have about your recent visit and your follow-up care?\"     none    \"If you have questions or things don't continue to improve, we encourage you contact us through the main clinic number (give number).  Even if the clinic is not open, triage nurses are available 24/7 to help you.     We would like you to know that our clinic has extended hours (provide information).  We also have urgent care (provide details on closest location and hours/contact info)\"    \"Thank you for your time and take care!\"                "

## 2022-04-23 DIAGNOSIS — F41.1 GAD (GENERALIZED ANXIETY DISORDER): ICD-10-CM

## 2022-04-23 NOTE — LETTER
MAXIMO [address] [id number] [phone]  April 25, 2022  Liz Haider  [address] [address] [place] [id number]    Dear Liz,    I am contacting you regarding the refill request we received for you. After reviewing your chart it looks like you are not due for refills yet. You should have a follow up appointment with Dr Miller about your medication and usage please.  Please call [phone] or schedule this through my chart to continue to receive refills. If you anticipate running out before your appointment let us know and we can send in a teresa refill.       Thank you,     [facility] nursing staff

## 2022-04-25 RX ORDER — LORAZEPAM 0.5 MG/1
TABLET ORAL
Qty: 20 TABLET | Refills: 0 | OUTPATIENT
Start: 2022-04-25

## 2022-04-25 NOTE — TELEPHONE ENCOUNTER
Patient is using too frequently. Recommend appointment to discuss better and safer ways to manage anxiety.

## 2022-04-25 NOTE — TELEPHONE ENCOUNTER
Lorazepam 0.5 mg  Last Written Prescription Date:  4/19/2022  Last Fill Quantity: 15,  # refills: 0   Last office visit: 9/16/2021 with prescribing provider:  Future Office Visit:      Routing refill request to provider for review/approval because:  Drug not on the [facility], P or [facility] refill protocol or controlled substance    Wen F - Registered Nurse  [facility]  Acute and Diagnostic Services

## 2022-04-26 ENCOUNTER — OFFICE VISIT (OUTPATIENT)
Dept: URGENT CARE | Facility: URGENT CARE | Age: 72
End: 2022-04-26
Payer: COMMERCIAL

## 2022-04-26 VITALS
DIASTOLIC BLOOD PRESSURE: 91 MMHG | WEIGHT: 148 LBS | BODY MASS INDEX: 27.96 KG/M2 | SYSTOLIC BLOOD PRESSURE: 167 MMHG | HEART RATE: 69 BPM | OXYGEN SATURATION: 98 % | TEMPERATURE: 97.4 F

## 2022-04-26 DIAGNOSIS — R31.9 URINARY TRACT INFECTION WITH HEMATURIA, SITE UNSPECIFIED: Primary | ICD-10-CM

## 2022-04-26 DIAGNOSIS — N39.0 URINARY TRACT INFECTION WITH HEMATURIA, SITE UNSPECIFIED: Primary | ICD-10-CM

## 2022-04-26 DIAGNOSIS — I10 BENIGN ESSENTIAL HYPERTENSION: ICD-10-CM

## 2022-04-26 LAB
ALBUMIN UR-MCNC: NEGATIVE MG/DL
APPEARANCE UR: CLEAR
BACTERIA #/AREA URNS HPF: ABNORMAL /HPF
BILIRUB UR QL STRIP: NEGATIVE
COLOR UR AUTO: YELLOW
GLUCOSE UR STRIP-MCNC: NEGATIVE MG/DL
HGB UR QL STRIP: ABNORMAL
KETONES UR STRIP-MCNC: NEGATIVE MG/DL
LEUKOCYTE ESTERASE UR QL STRIP: ABNORMAL
NITRATE UR QL: NEGATIVE
PH UR STRIP: 5.5 [PH] (ref 5–7)
RBC #/AREA URNS AUTO: ABNORMAL /HPF
SP GR UR STRIP: <=1.005 (ref 1–1.03)
SQUAMOUS #/AREA URNS AUTO: ABNORMAL /LPF
UROBILINOGEN UR STRIP-ACNC: 0.2 E.U./DL
WBC #/AREA URNS AUTO: ABNORMAL /HPF
WBC CLUMPS #/AREA URNS HPF: PRESENT /HPF

## 2022-04-26 PROCEDURE — 99213 OFFICE O/P EST LOW 20 MIN: CPT | Performed by: FAMILY MEDICINE

## 2022-04-26 PROCEDURE — 81001 URINALYSIS AUTO W/SCOPE: CPT | Performed by: FAMILY MEDICINE

## 2022-04-26 RX ORDER — CEFDINIR 300 MG/1
300 CAPSULE ORAL 2 TIMES DAILY
Qty: 10 CAPSULE | Refills: 0 | Status: SHIPPED | OUTPATIENT
Start: 2022-04-26 | End: 2022-05-02

## 2022-04-26 NOTE — PROGRESS NOTES
SUBJECTIVE: Liz Haider is a 71 year old female who  presents today for a possible UTI.   Symptoms of dysuria and frequency have been going on for THIS AM.    Hematuria yes.  still present  Past Medical History:   Diagnosis Date     Acid reflux disease      Benign essential hypertension      CAD (coronary artery disease) 2006    [facility]; acute NSTEMI, severe single vessel disease in RCA, PTCA/EUGENIO     VISHAL (generalized anxiety disorder)      History of alcohol abuse     sober since 1987     History of basal cell carcinoma     multiple around right eye     Impaired fasting glucose      MDD (major depressive disorder)      Microscopic hematuria     multiple, negative work-ups     Osteopenia      Allergies   Allergen Reactions     Penicillins Hives     Sulfa Drugs Hives     Social History     Tobacco Use     Smoking status: Former Smoker     Packs/day: 2.00     Years: 35.00     Pack years: 70.00     Types: Cigarettes     Quit date: 7/1/2006     Years since quitting: 15.8     Smokeless tobacco: Never Used   Substance Use Topics     Alcohol use: No     Comment: history of alcohol abuse; sober since 1987       ROS: CONSTITUTIONAL:NEGATIVE for fever, chills, change in weight    OBJECTIVE:  BP (!) 167/91 (BP Location: Left arm, Patient Position: Sitting, Cuff Size: Adult Regular)   Pulse 69   Temp 97.4  F (36.3  C) (Oral)   Wt 67.1 kg (148 lb)   SpO2 98%   BMI 27.96 kg/m    NAD  No Flank/abd pain      ICD-10-CM    1. Urinary tract infection with hematuria, site unspecified  N39.0 cefdinir (OMNICEF) 300 MG capsule    R31.9    2. Benign essential hypertension  I10      Take whole Norvasc Tablet every day if elevated BP conts  Drink plenty of fluids.  Prevention and treatment of UTI's discussed.Signs and symptoms of pyelonephritis mentioned.  Follow up with primary care physician if not improving

## 2022-05-02 ENCOUNTER — OFFICE VISIT (OUTPATIENT)
Dept: INTERNAL MEDICINE | Facility: CLINIC | Age: 72
End: 2022-05-02

## 2022-05-02 ENCOUNTER — E-VISIT (OUTPATIENT)
Dept: INTERNAL MEDICINE | Facility: CLINIC | Age: 72
End: 2022-05-02
Payer: COMMERCIAL

## 2022-05-02 VITALS
HEIGHT: 61 IN | TEMPERATURE: 98.8 F | DIASTOLIC BLOOD PRESSURE: 64 MMHG | HEART RATE: 76 BPM | BODY MASS INDEX: 27.94 KG/M2 | OXYGEN SATURATION: 99 % | WEIGHT: 148 LBS | SYSTOLIC BLOOD PRESSURE: 124 MMHG

## 2022-05-02 DIAGNOSIS — Z53.9 ERRONEOUS ENCOUNTER--DISREGARD: Primary | ICD-10-CM

## 2022-05-02 DIAGNOSIS — F41.1 GAD (GENERALIZED ANXIETY DISORDER): ICD-10-CM

## 2022-05-02 DIAGNOSIS — R19.5 INCREASED MUCUS IN STOOL: ICD-10-CM

## 2022-05-02 DIAGNOSIS — R15.9 FULL INCONTINENCE OF FECES: ICD-10-CM

## 2022-05-02 DIAGNOSIS — F33.42 RECURRENT MAJOR DEPRESSIVE DISORDER, IN FULL REMISSION (H): ICD-10-CM

## 2022-05-02 DIAGNOSIS — K62.3 RECTAL PROLAPSE: Primary | ICD-10-CM

## 2022-05-02 DIAGNOSIS — K59.00 DIFFICULT BOWEL MOVEMENTS: ICD-10-CM

## 2022-05-02 PROCEDURE — 99213 OFFICE O/P EST LOW 20 MIN: CPT | Performed by: INTERNAL MEDICINE

## 2022-05-02 RX ORDER — CALCIUM POLYCARBOPHIL 625 MG 625 MG/1
2 TABLET ORAL DAILY
COMMUNITY
Start: 2021-01-02 | End: 2022-06-20

## 2022-05-02 NOTE — PROGRESS NOTES
"  ASSESSMENT/PLAN                                                      (K62.3) Rectal prolapse  (primary encounter diagnosis)  (K59.00) Difficult bowel movements  (R15.9) Full incontinence of feces  (R19.5) Increased mucus in stool  Plan: referred to colorectal surgery for further evaluation and treatment - patient to schedule.     (F33.42) Recurrent major depressive disorder, in full remission (H)  (F41.1) VISHAL (generalized anxiety disorder)  Comment: well-controlled on current regimen.      Sydnee Miller MD   [address] [address]  T: [phone], F: [phone]    SUBJECTIVE                                                      Liz Haider is a very pleasant 71 year old female who presents with GI symptoms:    Ongoing for the last several days. Symptoms include significant and continuous mucus discharge from her anus, bulging from her anus, difficulty having normal bowel movements (while sitting on the toilet), but will have full fecal incontinence when not on the toilet.    PSH significant for Davinci rectopexy, sacrocolpopexy, and mid urethral sling in 2018.    OBJECTIVE                                                      /64   Pulse 76   Temp 98.8  F (37.1  C) (Tympanic)   Ht 1.549 m (5' 1\")   Wt 67.1 kg (148 lb)   LMP  (LMP Unknown)   SpO2 99%   Breastfeeding No   BMI 27.96 kg/m    Constitutional: well-appearing  Perianal exam: no sphincter tone on exam; rectal prolapse noted on exam; no hemorrhoids or bleeding noted on exam; no masses or stool palpated on digital exam    ---    (Note documentation was completed, in part, with [employer] voice-recognition software. Documentation was reviewed, but some grammatical, spelling, and word errors may remain.)    "

## 2022-05-03 ENCOUNTER — E-VISIT (OUTPATIENT)
Dept: INTERNAL MEDICINE | Facility: CLINIC | Age: 72
End: 2022-05-03

## 2022-05-03 ENCOUNTER — LAB (OUTPATIENT)
Dept: LAB | Facility: CLINIC | Age: 72
End: 2022-05-03
Payer: COMMERCIAL

## 2022-05-03 DIAGNOSIS — R39.198 DIFFICULTY URINATING: ICD-10-CM

## 2022-05-03 DIAGNOSIS — K59.00 CONSTIPATION, UNSPECIFIED CONSTIPATION TYPE: ICD-10-CM

## 2022-05-03 DIAGNOSIS — R10.84 ABDOMINAL PAIN, GENERALIZED: Primary | ICD-10-CM

## 2022-05-03 DIAGNOSIS — R10.84 ABDOMINAL PAIN, GENERALIZED: ICD-10-CM

## 2022-05-03 LAB
ALBUMIN SERPL-MCNC: 3.7 G/DL (ref 3.4–5)
ALBUMIN UR-MCNC: NEGATIVE MG/DL
ALP SERPL-CCNC: 60 U/L (ref 40–150)
ALT SERPL W P-5'-P-CCNC: 27 U/L (ref 0–50)
ANION GAP SERPL CALCULATED.3IONS-SCNC: 5 MMOL/L (ref 3–14)
APPEARANCE UR: CLEAR
AST SERPL W P-5'-P-CCNC: 20 U/L (ref 0–45)
BACTERIA #/AREA URNS HPF: ABNORMAL /HPF
BILIRUB SERPL-MCNC: 0.7 MG/DL (ref 0.2–1.3)
BILIRUB UR QL STRIP: NEGATIVE
BUN SERPL-MCNC: 7 MG/DL (ref 7–30)
CALCIUM SERPL-MCNC: 9.1 MG/DL (ref 8.5–10.1)
CHLORIDE BLD-SCNC: 98 MMOL/L (ref 94–109)
CO2 SERPL-SCNC: 26 MMOL/L (ref 20–32)
COLOR UR AUTO: YELLOW
CREAT SERPL-MCNC: 0.82 MG/DL (ref 0.52–1.04)
ERYTHROCYTE [DISTWIDTH] IN BLOOD BY AUTOMATED COUNT: 13 % (ref 10–15)
GFR SERPL CREATININE-BSD FRML MDRD: 75 ML/MIN/1.73M2
GLUCOSE BLD-MCNC: 117 MG/DL (ref 70–99)
GLUCOSE UR STRIP-MCNC: NEGATIVE MG/DL
HCT VFR BLD AUTO: 37.7 % (ref 35–47)
HGB BLD-MCNC: 12.7 G/DL (ref 11.7–15.7)
HGB UR QL STRIP: ABNORMAL
KETONES UR STRIP-MCNC: NEGATIVE MG/DL
LEUKOCYTE ESTERASE UR QL STRIP: NEGATIVE
MCH RBC QN AUTO: 30.6 PG (ref 26.5–33)
MCHC RBC AUTO-ENTMCNC: 33.7 G/DL (ref 31.5–36.5)
MCV RBC AUTO: 91 FL (ref 78–100)
NITRATE UR QL: NEGATIVE
PH UR STRIP: 5.5 [PH] (ref 5–7)
PLATELET # BLD AUTO: 265 10E3/UL (ref 150–450)
POTASSIUM BLD-SCNC: 3.9 MMOL/L (ref 3.4–5.3)
PROT SERPL-MCNC: 7.2 G/DL (ref 6.8–8.8)
RBC # BLD AUTO: 4.15 10E6/UL (ref 3.8–5.2)
RBC #/AREA URNS AUTO: ABNORMAL /HPF
SODIUM SERPL-SCNC: 129 MMOL/L (ref 133–144)
SP GR UR STRIP: 1.01 (ref 1–1.03)
SQUAMOUS #/AREA URNS AUTO: ABNORMAL /LPF
UROBILINOGEN UR STRIP-ACNC: 0.2 E.U./DL
WBC # BLD AUTO: 8.2 10E3/UL (ref 4–11)
WBC #/AREA URNS AUTO: ABNORMAL /HPF

## 2022-05-03 PROCEDURE — 36415 COLL VENOUS BLD VENIPUNCTURE: CPT

## 2022-05-03 PROCEDURE — 85027 COMPLETE CBC AUTOMATED: CPT

## 2022-05-03 PROCEDURE — 80053 COMPREHEN METABOLIC PANEL: CPT

## 2022-05-03 PROCEDURE — 99421 OL DIG E/M SVC 5-10 MIN: CPT | Performed by: INTERNAL MEDICINE

## 2022-05-03 PROCEDURE — 81001 URINALYSIS AUTO W/SCOPE: CPT

## 2022-05-03 RX ORDER — BISACODYL 5 MG/1
5-10 TABLET, DELAYED RELEASE ORAL DAILY PRN
Qty: 10 TABLET | Refills: 0 | Status: SHIPPED | OUTPATIENT
Start: 2022-05-03 | End: 2022-06-20

## 2022-05-04 ENCOUNTER — TELEPHONE (OUTPATIENT)
Dept: INTERNAL MEDICINE | Facility: CLINIC | Age: 72
End: 2022-05-04
Payer: COMMERCIAL

## 2022-05-04 ENCOUNTER — HOSPITAL ENCOUNTER (EMERGENCY)
Facility: CLINIC | Age: 72
Discharge: HOME | End: 2022-05-04
Attending: EMERGENCY MEDICINE | Admitting: EMERGENCY MEDICINE
Payer: COMMERCIAL

## 2022-05-04 VITALS
RESPIRATION RATE: 18 BRPM | OXYGEN SATURATION: 100 % | TEMPERATURE: 98.8 F | HEART RATE: 74 BPM | SYSTOLIC BLOOD PRESSURE: 145 MMHG | DIASTOLIC BLOOD PRESSURE: 107 MMHG

## 2022-05-04 DIAGNOSIS — K62.3 RECTAL PROLAPSE: ICD-10-CM

## 2022-05-04 PROCEDURE — 99282 EMERGENCY DEPT VISIT SF MDM: CPT

## 2022-05-04 RX ORDER — LIDOCAINE HYDROCHLORIDE 20 MG/ML
JELLY TOPICAL PRN
Qty: 30 ML | Refills: 0 | Status: SHIPPED | OUTPATIENT
Start: 2022-05-04 | End: 2022-06-20

## 2022-05-04 ASSESSMENT — ENCOUNTER SYMPTOMS
ANAL BLEEDING: 1
ABDOMINAL PAIN: 0
RECTAL PAIN: 1
DIARRHEA: 1

## 2022-05-04 NOTE — TELEPHONE ENCOUNTER
Received a call from the patient wondering if there is any type of medication she can try to help alleviate some of the pain she is experiencing with the rectal prolapse. Patient states she is waiting to hear back from colorectal surgery regarding an appointment and wants to know if there is anything she can do right now to help with the discomfort.    Routing to PCP for recommendations.     Liz Caban RN

## 2022-05-04 NOTE — TELEPHONE ENCOUNTER
I'm sorry - I'm familiar with pain management strategies for rectal prolapse and I'm not sure that her rectal prolapse is what is causing her pain. We are treating her constipation which may be contributing to her pain. Has the dulcolax I prescribed yesterday produced significant bowel movements?

## 2022-05-04 NOTE — ED TRIAGE NOTES
Patient presents with complaints of rectal prolapse. Patient has been seen by her PCP twice in the past week for this. Patient states that pain has increased. She states that she has now noted blood as mucous as well. . ABC intact without need for intervention at this time.

## 2022-05-05 ENCOUNTER — PATIENT OUTREACH (OUTPATIENT)
Dept: CARE COORDINATION | Facility: CLINIC | Age: 72
End: 2022-05-05
Payer: COMMERCIAL

## 2022-05-05 DIAGNOSIS — Z71.89 OTHER SPECIFIED COUNSELING: ICD-10-CM

## 2022-05-05 NOTE — PROGRESS NOTES
Clinic Care Coordination Contact  [facility]: Post-Discharge Note  SITUATION                                                      Admission:    Admission Date: 05/04/22   Reason for Admission: Rectal Prolapse  Discharge:   Discharge Date: 05/04/22  Discharge Diagnosis: Rectal Prolapse    BACKGROUND                                                      Per hospital discharge summary and inpatient provider notes:  71 yr old F presenting to the ED for evaluation of rectal prolapse.  VS on presentation reveal elevated BP, though otherwise are unremarkable.  History, exam, and ED course as outlined above.  Exam reveals the presence of rectal prolapse, that is easily reducible.  Patient denies any associated abdominal pain, fevers, nor chills.  She did undergo thorough laboratory evaluation just yesterday, which revealed normal WBC count, unremarkable UA, and nonobstructive abdominal x-ray.  I discussed the case with Dr. Solorzano of colorectal surgery, who recommended close outpatient follow-up and likely need for flexible sigmoidoscopy for further evaluation.  She has otherwise recommended supportive cares including sitz bath's, anti-inflammatories, continuation of fiber supplementation, though no other emergent intervention.  Patient denies any presence of fevers, and exhibits no localizing abdominal pain on exam.  At this point I feel further imaging or laboratory studies can be deferred safely.  Recommendations from specialty consultation were discussed with the patient.  Will provide a short course of topical lidocaine gel to assist with symptom relief and instructed to apply only to skin around anal area and not on prolapsed rectal tissue.  Colon and rectal surgery will contact the patient to assist close outpatient follow-up.  Patient otherwise is felt stable for discharge from the ED with close outpatient follow-up.  Return to ED with any new or troubling symptoms.       ASSESSMENT      Enrollment  Primary  Care Care Coordination Status: Declined    Discharge Assessment  How are you doing now that you are home?: I am in pain, héctor to get the lidocaine today, can not get into the 17th  How are your symptoms? (Red Flag symptoms escalate to triage hotline per guidelines): Unchanged  Do you feel your condition is stable enough to be safe at home until your provider visit?: Yes  Does the patient have their discharge instructions? : Yes  Does the patient have questions regarding their discharge instructions? : No  Were you started on any new medications or were there changes to any of your previous medications? : Yes  Does the patient have all of their medications?: No (see comment) (waited at [facility] was too long to wait, getting this morning)  Do you have questions regarding any of your medications? : No  Do you have all of your needed medical supplies or equipment (DME)?  (i.e. oxygen tank, CPAP, cane, etc.): Yes  Discharge follow-up appointment scheduled within 14 calendar days? : Yes  Discharge Follow Up Appointment Date: 05/17/22  Discharge Follow Up Appointment Scheduled with?: Primary Care Provider  Also has a appointment with surgery on 5/17/2022      PLAN                                                      1 Outpatient Plan:  Schedule an appointment with Jacqueline Solorzano MD (Surgery) in 2 days (5/6/2022)          Future Appointments   Date Time Provider Department Center   5/17/2022  4:00 PM Sydnee Miller MD [facility]         For any urgent concerns, please contact our 24 hour nurse triage line: [phone] ([phone])         Shirin Valdivia MA

## 2022-05-05 NOTE — DISCHARGE INSTRUCTIONS
Please apply a small amount of lidocaine cream to the perirectal area.  Do NOT apply to the rectal tissue itself.    Follow-up with Colon and Rectal Surgery later this week.

## 2022-05-05 NOTE — ED NOTES
Patient up to the bathroom. States she has known rectal prolapse. Pain has been so bad for 3 days hasn't slept and doesn't know what to do any more. Here for some relief.

## 2022-05-05 NOTE — ED PROVIDER NOTES
History   Chief Complaint:  Rectal Prolapse       HPI   Liz Haider is a 71 year old female who presents with concerns for rectal prolapse. Prior history significant for previous rectopexy and pelvic sling.  She has had intermittent prolapse since then, though this past week has been worse.  When prolapsed, she notes increased mucous discharge and occasional blood from the tissue.. She has noticed mucus and blood coming from the rectum. She needs to wear a diaper due to the mucus, blood production, and loss of bowel. She has to change her diaper at night. She had tried to call a pelvic specialist two days ago and has not gotten a call back. She has been having diarrhea. She as been taking fiber tablets every night. She denies abdominal pain. Saw her PCP 2 days ago for this and had an XR obtained.  Notes discomfort primarily when prolapsed, and described as a burning pain.    Review of Systems   Gastrointestinal: Positive for anal bleeding, diarrhea and rectal pain. Negative for abdominal pain.   All other systems reviewed and are negative.       Allergies:  Penicillins  Sulfa Drugs     Medications:  Amlodipine  Abilify  Aspirin  Atenolol  Atorvastatin  Atarax  Lisinopril  Ativan  Prilosec  Zoloft  Aldactone  Cholecalciferol  Ambien     Past Medical History:     Acid reflux disease  Acute MI  Anxiety state  Basal cell carcinoma  CAD  Depression   VISHAL  Hyperlipidemia   Hypertension  Insomnia   Obesity   Osteopenia    Rectal prolapse    Past Surgical History:    Colonoscopy  DaVinci rectopexy  DaVinci sacrocolpopexy and cystoscopy, with BSO and mid-urethral sling  ALEX  Tonsillectomy and adenoidectomy   Rectal pexus surgery     Family History:    Father: MI, alcohol/drug abuse  Mother: Dementia, alcohol/drug abuse, schizophrenia, depression  Brother: Tongue cancer, alcohol/drug abuse  Sister: Type 2 diabetes, bladder cancer, dementia, AAA, cirrhosis, alcohol/drug abuse     Social History:  Presents to the emergency  department alone     Physical Exam     Patient Vitals for the past 24 hrs:   BP Temp Temp src Pulse Resp SpO2   05/04/22 1720 (!) 140/83 98.8  F (37.1  C) Temporal 79 18 95 %       Physical Exam  General:              Well-nourished              Speaking in full sentences  Eyes:              Conjunctiva without injection or scleral icterus  ENT:              Moist mucous membranes              Nares patent              Pinnae normal  Neck:              Full ROM              No stiffness appreciated  Resp:              Lungs CTAB              No crackles, wheezing or audible rubs              Good air movement  CV:                    Normal rate, regular rhythm              S1 and S2 present              No murmur, gallop or rub  GI:              BS present              Abdomen soft without distention              Non-tender to light and deep palpation              No guarding or rebound tenderness  :              Rectal prolapse, easily reducible              Erythema noted about yuriy-rectal tissue              No fluctuance noted about yuriy-rectal region  Skin:              Warm, dry, well perfused              No rashes or open wounds on exposed skin  MSK:              Moves all extremities              No focal deformities or swelling  Neuro:              Alert              Answers questions appropriately              Moves all extremities equally              Gait stable  Psych:              Normal affect, normal mood    Emergency Department Course     Emergency Department Course:    Reviewed:  I reviewed nursing notes, vitals, past medical history and Care Everywhere    Assessments:  1935 I obtained history and examined the patient as noted above.     2017 I rechecked the patient and explained findings.     Consults:  1951 I spoke with Dr. Solorzano, colon and rectal surgery, regarding the patient's rectal prolapse.     2012 I spoke with pharmacy regarding medication application     Disposition:  The patient was  discharged to home.     Impression & Plan     Medical Decision Making:  Liz Haider is a 71 yr old F presenting to the ED for evaluation of rectal prolapse.  VS on presentation reveal elevated BP, though otherwise are unremarkable.  History, exam, and ED course as outlined above.  Exam reveals the presence of rectal prolapse, that is easily reducible.  Patient denies any associated abdominal pain, fevers, nor chills.  She did undergo thorough laboratory evaluation just yesterday, which revealed normal WBC count, unremarkable UA, and nonobstructive abdominal x-ray.  I discussed the case with Dr. Solorzano of colorectal surgery, who recommended close outpatient follow-up and likely need for flexible sigmoidoscopy for further evaluation.  She has otherwise recommended supportive cares including sitz bath's, anti-inflammatories, continuation of fiber supplementation, though no other emergent intervention.  Patient denies any presence of fevers, and exhibits no localizing abdominal pain on exam.  At this point I feel further imaging or laboratory studies can be deferred safely.  Recommendations from specialty consultation were discussed with the patient.  Will provide a short course of topical lidocaine gel to assist with symptom relief and instructed to apply only to skin around anal area and not on prolapsed rectal tissue.  Colon and rectal surgery will contact the patient to assist close outpatient follow-up.  Patient otherwise is felt stable for discharge from the ED with close outpatient follow-up.  Return to ED with any new or troubling symptoms.      Diagnosis:    ICD-10-CM    1. Rectal prolapse  K62.3        Discharge Medications:  New Prescriptions    LIDOCAINE (XYLOCAINE) 2 % EXTERNAL GEL    Apply topically as needed for moderate pain Apply once or twice a day only to the skin around your rectum.  Do not apply to rectal tissue.       Scribe Disclosure:  Bib XIAO, am serving as a scribe at 7:15 PM on  5/4/2022 to document services personally performed by Justin Wilson MD based on my observations and the provider's statements to me.          Justin Wilson MD  05/05/22 025

## 2022-05-05 NOTE — TELEPHONE ENCOUNTER
Called and asked patient she states she did have a BM and it helped with a pain for a little bit patient went to ER last night due to pain and was prescribed Lidocaine she will pick this up today to see if it helps    Girma Ramirez RN

## 2022-05-07 ENCOUNTER — HOSPITAL ENCOUNTER (EMERGENCY)
Facility: CLINIC | Age: 72
Discharge: HOME | End: 2022-05-07
Attending: EMERGENCY MEDICINE | Admitting: EMERGENCY MEDICINE
Payer: COMMERCIAL

## 2022-05-07 VITALS
HEART RATE: 72 BPM | BODY MASS INDEX: 27.89 KG/M2 | HEIGHT: 61 IN | TEMPERATURE: 97 F | OXYGEN SATURATION: 97 % | RESPIRATION RATE: 16 BRPM | WEIGHT: 147.71 LBS | SYSTOLIC BLOOD PRESSURE: 142 MMHG | DIASTOLIC BLOOD PRESSURE: 83 MMHG

## 2022-05-07 DIAGNOSIS — K62.3 RECTAL PROLAPSE: ICD-10-CM

## 2022-05-07 LAB
ALBUMIN UR-MCNC: NEGATIVE MG/DL
APPEARANCE UR: CLEAR
BILIRUB UR QL STRIP: NEGATIVE
COLOR UR AUTO: ABNORMAL
GLUCOSE UR STRIP-MCNC: NEGATIVE MG/DL
HGB UR QL STRIP: ABNORMAL
KETONES UR STRIP-MCNC: NEGATIVE MG/DL
LEUKOCYTE ESTERASE UR QL STRIP: NEGATIVE
NITRATE UR QL: NEGATIVE
PH UR STRIP: 6.5 [PH] (ref 5–7)
RBC URINE: 4 /HPF
SP GR UR STRIP: 1 (ref 1–1.03)
UROBILINOGEN UR STRIP-MCNC: NORMAL MG/DL
WBC URINE: <1 /HPF

## 2022-05-07 PROCEDURE — 81001 URINALYSIS AUTO W/SCOPE: CPT | Performed by: EMERGENCY MEDICINE

## 2022-05-07 PROCEDURE — 99283 EMERGENCY DEPT VISIT LOW MDM: CPT

## 2022-05-07 PROCEDURE — 250N000009 HC RX 250: Performed by: EMERGENCY MEDICINE

## 2022-05-07 PROCEDURE — 250N000013 HC RX MED GY IP 250 OP 250 PS 637: Performed by: EMERGENCY MEDICINE

## 2022-05-07 RX ORDER — LIDOCAINE HYDROCHLORIDE 20 MG/ML
JELLY TOPICAL PRN
Qty: 85 G | Refills: 0 | Status: SHIPPED | OUTPATIENT
Start: 2022-05-07 | End: 2022-06-20

## 2022-05-07 RX ORDER — HYDROCODONE BITARTRATE AND ACETAMINOPHEN 5; 325 MG/1; MG/1
2 TABLET ORAL ONCE
Status: COMPLETED | OUTPATIENT
Start: 2022-05-07 | End: 2022-05-07

## 2022-05-07 RX ORDER — HYDROCODONE BITARTRATE AND ACETAMINOPHEN 5; 325 MG/1; MG/1
1 TABLET ORAL EVERY 6 HOURS PRN
Qty: 10 TABLET | Refills: 0 | Status: SHIPPED | OUTPATIENT
Start: 2022-05-07 | End: 2022-06-20

## 2022-05-07 RX ORDER — LIDOCAINE HYDROCHLORIDE 20 MG/ML
1 JELLY TOPICAL ONCE
Status: COMPLETED | OUTPATIENT
Start: 2022-05-07 | End: 2022-05-07

## 2022-05-07 RX ADMIN — HYDROCODONE BITARTRATE AND ACETAMINOPHEN 2 TABLET: 5; 325 TABLET ORAL at 10:42

## 2022-05-07 RX ADMIN — LIDOCAINE HYDROCHLORIDE 1 TUBE: 20 JELLY TOPICAL at 10:42

## 2022-05-07 ASSESSMENT — ENCOUNTER SYMPTOMS
ABDOMINAL PAIN: 0
RECTAL PAIN: 1
DIFFICULTY URINATING: 1

## 2022-05-07 NOTE — ED PROVIDER NOTES
"  History   Chief Complaint:  Rectal Pain       HPI   Liz Haider is a 71 year old female with history of HTN and prolapsed rectum who presents with rectal pain. The patient states that she was in on Wednesday because of her prolapsed rectum and the pain did not go away after the visit and the same issues are still occurring. She noted some discharge coming from the area along with the inability to urinate. She notes that the pain is keeping her up at night. She denies serious abdominal pain. She has a date with the colorectal surgeon on May 17.    Review of Systems   Gastrointestinal: Positive for rectal pain. Negative for abdominal pain.   Genitourinary: Positive for difficulty urinating.   All other systems reviewed and are negative.    Allergies:  Penicillins  Sulfa Drugs    Medications:  amLODIPine   aspirin   atenolol   atorvastatin   bisacodyl  calcium polycarbophil   hydrOXYzine   lisinopril  LORazepam   omeprazole   sertraline   spironolactone   zolpidem     Past Medical History:     Acid reflux  HTN  CAD  VISHAL  Alcohol abuse  Basal cell carcinoma  Depression  Hematuria  Osteopenia    Past Surgical History:    Colonoscopy  DaVinci rectopexy  DaVinci Sacrocolpopexy  DaVinci cystoscopy    Family History:    Father-myocardial infarction  Mother-Cirrhosis  Sister-Diabetes, bladder cancer, Dementia, Aneurysm  Brother-Tongue cancer    Social History:  The patient presents to the ED with a family friend.    Physical Exam     Patient Vitals for the past 24 hrs:   BP Temp Temp src Pulse Resp SpO2 Height Weight   05/07/22 0953 (!) 142/83 97  F (36.1  C) Temporal 72 16 97 % 1.549 m (5' 1\") 67 kg (147 lb 11.3 oz)       Physical Exam  Constitutional: Patient is well appearing. No distress. Ambulatory without difficulty  Head: Atraumatic.  Eyes: Conjunctivae are normal. No scleral icterus.  Cardiovascular: Normal rate, regular rhythm, normal heart sounds and intact distal perfusion.   Pulmonary/Chest: Breath sounds " normal. No respiratory distress.  Abdominal: Soft. Bowel sounds are normal. No distension. No tenderness. No rebound or guarding.   Musculoskeletal: Normal range of motion. No edema or tenderness.   Neurological: Alert and orientated to person, place, and time. No observable focal neuro deficit  Skin: Warm and dry. No rash noted. Not diaphoretic.   Rectal: Patient put prone position has diffuse laxity of rectal verge, prolapse essentially reduces itself with gravity laying face down, by palpitation easily reduced, no redness abscess or bleeding at this time.      Emergency Department Course     Laboratory:  Labs Ordered and Resulted from Time of ED Arrival to Time of ED Departure   ROUTINE UA WITH MICROSCOPIC REFLEX TO CULTURE - Abnormal       Result Value    Color Urine Light Yellow      Appearance Urine Clear      Glucose Urine Negative      Bilirubin Urine Negative      Ketones Urine Negative      Specific Gravity Urine 1.005      Blood Urine Small (*)     pH Urine 6.5      Protein Albumin Urine Negative      Urobilinogen Urine Normal      Nitrite Urine Negative      Leukocyte Esterase Urine Negative      RBC Urine 4 (*)     WBC Urine <1        Emergency Department Course:         Reviewed:  I reviewed nursing notes, vitals, past medical history and Care Everywhere    Assessments:  1010 I obtained history and examined the patient as noted above.   1231 I rechecked the patient and explained findings.     Consults:  1213 Talked to Dr. Solorzano about symptoms and next steps.    Interventions:  1042 Xylocaine 1tube Topical  1042 Norco 2tablet PO     Disposition:  The patient was discharged to home.     Impression & Plan         Medical Decision Making:  Pt has complex pelvic floor surgery history with mesh and colorectal states again today nothing that can be done today and no red flags.  Has plan for complex surgical consult with colorectal and urology.  Urinated here and UA normal.  Good relief with topical lido and  norco.  Discussed stool softeners and topical meds.      Diagnosis:    ICD-10-CM    1. Rectal prolapse  K62.3        Discharge Medications:  New Prescriptions    No medications on file       Scribe Disclosure:  I, Dae Monahan, am serving as a scribe at 10:05 AM on 5/7/2022 to document services personally performed by Renny Huggins MD based on my observations and the provider's statements to me.              Renny Huggins MD  05/07/22 1483

## 2022-05-07 NOTE — ED TRIAGE NOTES
Pt comes in for rectal pain from a prolapsed rectum. Pt states this has been going on for a couple weeks. Pt has an appointment to meet with a surgeon on May 17th. Pt is unable to sleep due to the pain.        Mild

## 2022-05-09 ENCOUNTER — DOCUMENTATION ONLY (OUTPATIENT)
Dept: OTHER | Facility: CLINIC | Age: 72
End: 2022-05-09
Payer: COMMERCIAL

## 2022-05-11 ENCOUNTER — MYC REFILL (OUTPATIENT)
Dept: INTERNAL MEDICINE | Facility: CLINIC | Age: 72
End: 2022-05-11
Payer: COMMERCIAL

## 2022-05-11 DIAGNOSIS — I10 BENIGN ESSENTIAL HYPERTENSION: ICD-10-CM

## 2022-05-12 RX ORDER — SPIRONOLACTONE 25 MG/1
25 TABLET ORAL DAILY
Qty: 90 TABLET | Refills: 0 | Status: SHIPPED | OUTPATIENT
Start: 2022-05-12 | End: 2022-08-17

## 2022-05-12 NOTE — TELEPHONE ENCOUNTER
Routing refill request to provider for review/approval because:  Labs out of range:   Sodium   Date Value Ref Range Status   05/03/2022 129 (L) 133 - 144 mmol/L Final   09/10/2020 134 133 - 144 mmol/L Final      BP Readings from Last 3 Encounters:   05/07/22 (!) 142/83   05/04/22 (!) 145/107   05/02/22 124/64

## 2022-05-17 ENCOUNTER — TRANSFERRED RECORDS (OUTPATIENT)
Dept: HEALTH INFORMATION MANAGEMENT | Facility: CLINIC | Age: 72
End: 2022-05-17
Payer: COMMERCIAL

## 2022-05-19 ENCOUNTER — TRANSFERRED RECORDS (OUTPATIENT)
Dept: HEALTH INFORMATION MANAGEMENT | Facility: CLINIC | Age: 72
End: 2022-05-19
Payer: COMMERCIAL

## 2022-05-26 ENCOUNTER — TRANSFERRED RECORDS (OUTPATIENT)
Dept: HEALTH INFORMATION MANAGEMENT | Facility: CLINIC | Age: 72
End: 2022-05-26
Payer: COMMERCIAL

## 2022-05-31 DIAGNOSIS — K21.9 GASTROESOPHAGEAL REFLUX DISEASE: ICD-10-CM

## 2022-06-20 ENCOUNTER — OFFICE VISIT (OUTPATIENT)
Dept: INTERNAL MEDICINE | Facility: CLINIC | Age: 72
End: 2022-06-20
Payer: COMMERCIAL

## 2022-06-20 VITALS
BODY MASS INDEX: 26.62 KG/M2 | HEIGHT: 61 IN | DIASTOLIC BLOOD PRESSURE: 76 MMHG | HEART RATE: 75 BPM | WEIGHT: 141 LBS | OXYGEN SATURATION: 97 % | TEMPERATURE: 97.6 F | SYSTOLIC BLOOD PRESSURE: 130 MMHG

## 2022-06-20 DIAGNOSIS — Z01.818 PREOPERATIVE EXAMINATION: Primary | ICD-10-CM

## 2022-06-20 DIAGNOSIS — K62.3 RECTAL PROLAPSE: ICD-10-CM

## 2022-06-20 LAB
ANION GAP SERPL CALCULATED.3IONS-SCNC: 3 MMOL/L (ref 3–14)
BUN SERPL-MCNC: 9 MG/DL (ref 7–30)
CALCIUM SERPL-MCNC: 9.2 MG/DL (ref 8.5–10.1)
CHLORIDE BLD-SCNC: 106 MMOL/L (ref 94–109)
CO2 SERPL-SCNC: 31 MMOL/L (ref 20–32)
CREAT SERPL-MCNC: 0.87 MG/DL (ref 0.52–1.04)
GFR SERPL CREATININE-BSD FRML MDRD: 70 ML/MIN/1.73M2
GLUCOSE BLD-MCNC: 112 MG/DL (ref 70–99)
POTASSIUM BLD-SCNC: 3.5 MMOL/L (ref 3.4–5.3)
SODIUM SERPL-SCNC: 140 MMOL/L (ref 133–144)

## 2022-06-20 PROCEDURE — 80048 BASIC METABOLIC PNL TOTAL CA: CPT | Performed by: INTERNAL MEDICINE

## 2022-06-20 PROCEDURE — 36415 COLL VENOUS BLD VENIPUNCTURE: CPT | Performed by: INTERNAL MEDICINE

## 2022-06-20 PROCEDURE — 99214 OFFICE O/P EST MOD 30 MIN: CPT | Performed by: INTERNAL MEDICINE

## 2022-06-20 ASSESSMENT — PATIENT HEALTH QUESTIONNAIRE - PHQ9
SUM OF ALL RESPONSES TO PHQ QUESTIONS 1-9: 18
10. IF YOU CHECKED OFF ANY PROBLEMS, HOW DIFFICULT HAVE THESE PROBLEMS MADE IT FOR YOU TO DO YOUR WORK, TAKE CARE OF THINGS AT HOME, OR GET ALONG WITH OTHER PEOPLE: VERY DIFFICULT
SUM OF ALL RESPONSES TO PHQ QUESTIONS 1-9: 18

## 2022-06-20 NOTE — PATIENT INSTRUCTIONS
Blood test today.     HOLD aspirin for 1 week prior to surgery.    HOLD spironolactone on the morning of surgery.    Take all other medications up to and on the morning of surgery (may take morning medications with a sip of water on the morning of surgery).

## 2022-06-20 NOTE — PROGRESS NOTES
ASSESSMENT/PLAN:                                                      Liz Haider is a pleasant 71 year old female who presents for a preoperative evaluation. She is undergoing an intermediate risk procedure. Her risk of major cardiac event is 1 point: 0.9%.    (Z01.818) Preoperative examination  (primary encounter diagnosis)  (K62.3) Rectal prolapse  Plan: repeat BMP today (to ensure no worsening of mild hyponatremia); HOLD aspirin for 1 week prior to surgery; HOLD spironolactone on the morning of surgery; take all other medications up to and on the morning of surgery (may take morning medications with a sip of water on the morning of surgery).     RECOMMEND PROCEEDING WITH SURGERY: YES    Sydnee Miller MD   [address] [address]  T: [phone], F: [phone]    SUBJECTIVE:                                                      Liz Haider is a very pleasant 71 year old female who presents for preoperative evaluation.    Surgeon: Huy  Date: 6/30/22  Location: [facility]  Surgery: Robotic assisted ventral rectopexy with mesh, posterior suture rectopexy, possible Altemeier procedure (intermediate risk)  Indication: rectal prolapse    Past Medical History:   Diagnosis Date     Acid reflux disease      Benign essential hypertension      CAD (coronary artery disease) 2006    [facility]; acute NSTEMI, severe single vessel disease in RCA, PTCA/EUGENIO     VISHAL (generalized anxiety disorder)      History of alcohol abuse     sober since 1987     History of basal cell carcinoma     multiple around right eye     Impaired fasting glucose      MDD (major depressive disorder)      Microscopic hematuria     multiple, negative work-ups     Osteopenia      Personal or family history of excessive or prolonged bleeding? No  Personal or family history of blood clots? No  History of snoring/Sleep Apnea? No  History of blood transfusions? No    Cardiovascular  risk: Ischemic cardiac disease (1 point)    Risk of major cardiac event: 1 point: 0.9%    Past Surgical History:   Procedure Laterality Date     COLONOSCOPY  4/20/2012    Procedure:COLONOSCOPY; COLONOSCOPY; Surgeon:EDSON SHELLEY; Location:[facility]     DAVINCI RECTOPEXY N/A 10/31/2018    Procedure: ROBOTIC ASSISTED XI VENTRAL RECTOPEXY (DENIS) ;  Surgeon: Kirsten Huynh MD;  Location: [facility]     DAVINCI SACROCOLPOPEXY, CYSTOSCOPY, COMBINED N/A 10/31/2018    Procedure: ROBOTIC ASSISTED XI SACROCOLPOPEXY, CYSTOSCOPY (CEFERINO), BILATERAL SALPINGO-OORPHORECTOMY, MID URETHRAL SLING;  Surgeon: Turner Jonas MD;  Location: [facility]     DAVINCI SACROCOLPOPEXY, MIDURETHRAL SLING, CYSTOSCOPY  10/31/2018    Procedure: DAVINCI SACROCOLPOPEXY, MIDURETHRAL SLING, CYSTOSCOPY;  Surgeon: Turner Jonas MD;  Location: [facility]     DAVINCI SALPINGO-OOPHORECTOMY INCLUDING BILATERAL Bilateral 10/31/2018    Procedure: DAVINCI SALPINGO-OOPHORECTOMY INCLUDING BILATERAL;  Surgeon: Turner Jonas MD;  Location: [facility]     HYSTERECTOMY TOTAL ABDOMINAL  1988    for heavy periods     TONSILLECTOMY & ADENOIDECTOMY  1952     Artificial assistive devices, such as pacemakers, artificial cardiac valves, or artificial joints? No    Allergies   Allergen Reactions     Penicillins Hives     Sulfa Drugs Hives     Personal or family history of allergy to anesthesia? No  Allergy to local or topical analgesics? No  Allergy to latex? No  Allergy to adhesives/skin preparations (chlorhexadine)? No    Current Outpatient Medications   Medication Sig     amLODIPine (NORVASC) 10 MG tablet Take 0.5 tablets (5 mg) by mouth daily Take 1/2 (one-half) tablet by mouth once daily     aspirin (ASA) 81 MG tablet Take 1 tablet (81 mg) by mouth daily     atenolol (TENORMIN) 100 MG tablet Take 1 tablet by mouth once daily     atorvastatin (LIPITOR) 40 MG tablet Take 1 tablet by mouth once daily     Biotin 1000 MCG CHEW Take 1,000 mcg by mouth daily      bisacodyl (DULCOLAX) 5 MG EC tablet Take 1-2 tablets (5-10 mg) by mouth daily as needed (severe constipation)     Calcium Carbonate-Vit D-Min (CALCIUM 1200) 3935-3867 MG-UNIT CHEW Take 1 chew tab by mouth daily     calcium polycarbophil (FIBERCON) 625 MG tablet Take 2 tablets by mouth daily     HYDROcodone-acetaminophen (NORCO) 5-325 MG tablet Take 1 tablet by mouth every 6 hours as needed for pain     hydrOXYzine (ATARAX) 10 MG tablet Take 1-2 tablets (10-20 mg) by mouth 3 times daily as needed for anxiety     lidocaine (XYLOCAINE) 2 % external gel Apply topically as needed for moderate pain (rectal pain)     lidocaine (XYLOCAINE) 2 % external gel Apply topically as needed for moderate pain Apply once or twice a day only to the skin around your rectum.  Do not apply to rectal tissue.     lisinopril (ZESTRIL) 20 MG tablet Take 1 tablet by mouth twice daily     LORazepam (ATIVAN) 0.5 MG tablet TAKE 1 TABLET BY MOUTH ONCE DAILY AS NEEDED FOR ANXIETY     omeprazole (PRILOSEC) 20 MG DR capsule Take 1 capsule by mouth once daily     sertraline (ZOLOFT) 100 MG tablet Take 1 tablet by mouth once daily     spironolactone (ALDACTONE) 25 MG tablet Take 1 tablet (25 mg) by mouth daily     vitamin D3 (CHOLECALCIFEROL) 1000 units (25 mcg) tablet Take 1 tablet (1,000 Units) by mouth daily     zolpidem (AMBIEN) 5 MG tablet TAKE 1 TABLET BY MOUTH AT BEDTIME     Over the counter medications? Other than above, no  Vitamin Supplements? Other than above, no  Herbal Supplements? Other than above, no    Family History   Problem Relation Age of Onset     Dementia Mother      Myocardial Infarction Father         later in life     Diabetes Type 2  Sister      Bladder Cancer Sister         smoking history     Frontotemporal dementia Sister      Abdominal Aortic Aneurysm Sister      Cirrhosis Sister      Tongue cancer Brother      Cerebrovascular Disease No family hx of      Coronary Artery Disease Early Onset No family hx of      Breast  "Cancer No family hx of      Ovarian Cancer No family hx of      Colon Cancer No family hx of      Social History     Occupational History     Occupation: Retired - [place]   Tobacco Use     Smoking status: Former Smoker     Packs/day: 2.00     Years: 35.00     Pack years: 70.00     Types: Cigarettes     Quit date: 7/1/2006     Years since quitting: 15.9     Smokeless tobacco: Never Used   Substance and Sexual Activity     Alcohol use: No     Comment: history of alcohol abuse; sober since 1987     Drug use: No     Sexual activity: Not Currently   Social History Narrative     (as of 2020).     No kids.    Walks daily, but no formal exercise.       Functional capacity: Can do light work around the house like dusting or washing dishes (4 METs)    OBJECTIVE:                                                      /76   Pulse 75   Temp 97.6  F (36.4  C) (Tympanic)   Ht 1.549 m (5' 1\")   Wt 64 kg (141 lb)   LMP  (LMP Unknown)   SpO2 97%   Breastfeeding No   BMI 26.64 kg/m    Constitutional: well-appearing  Respiratory: normal respiratory effort; clear to auscultation bilaterally  Cardiovascular: regular rate and rhythm; no edema  Gastrointestinal: soft, non-tender, non-distended, and bowel sounds present; no organomegaly or masses  Musculoskeletal: normal gait and station  Psych: normal judgment and insight; normal mood and affect    EKG (4/21/22): NSR     Latest Reference Range & Units 05/03/22 12:08   Sodium 133 - 144 mmol/L 129 (L)   Potassium 3.4 - 5.3 mmol/L 3.9   Chloride 94 - 109 mmol/L 98   Carbon Dioxide 20 - 32 mmol/L 26   Urea Nitrogen 7 - 30 mg/dL 7   Creatinine 0.52 - 1.04 mg/dL 0.82   GFR Estimate >60 mL/min/1.73m2 75 [1]   Calcium 8.5 - 10.1 mg/dL 9.1   Anion Gap 3 - 14 mmol/L 5   Albumin 3.4 - 5.0 g/dL 3.7   Protein Total 6.8 - 8.8 g/dL 7.2   Bilirubin Total 0.2 - 1.3 mg/dL 0.7   Alkaline Phosphatase 40 - 150 U/L 60   ALT 0 - 50 U/L 27   AST 0 - 45 U/L 20   Glucose " 70 - 99 mg/dL 117 (H)   WBC 4.0 - 11.0 10e3/uL 8.2   Hemoglobin 11.7 - 15.7 g/dL 12.7   Hematocrit 35.0 - 47.0 % 37.7   Platelet Count 150 - 450 10e3/uL 265   RBC Count 3.80 - 5.20 10e6/uL 4.15   MCV 78 - 100 fL 91   MCH 26.5 - 33.0 pg 30.6   MCHC 31.5 - 36.5 g/dL 33.7   RDW 10.0 - 15.0 % 13.0     ---    (Chart documentation was completed, in part, with [employer] voice-recognition software. Even though reviewed, some grammatical, spelling, and word errors may remain.)

## 2022-07-12 DIAGNOSIS — I10 BENIGN ESSENTIAL HYPERTENSION: ICD-10-CM

## 2022-07-12 DIAGNOSIS — I25.10 CORONARY ARTERY DISEASE INVOLVING NATIVE CORONARY ARTERY OF NATIVE HEART WITHOUT ANGINA PECTORIS: ICD-10-CM

## 2022-07-12 RX ORDER — AMLODIPINE BESYLATE 10 MG/1
TABLET ORAL
Qty: 45 TABLET | Refills: 0 | Status: SHIPPED | OUTPATIENT
Start: 2022-07-12 | End: 2022-10-19

## 2022-07-12 RX ORDER — ATORVASTATIN CALCIUM 40 MG/1
TABLET, FILM COATED ORAL
Qty: 90 TABLET | Refills: 0 | Status: SHIPPED | OUTPATIENT
Start: 2022-07-12 | End: 2022-10-19

## 2022-07-14 ENCOUNTER — ANCILLARY PROCEDURE (OUTPATIENT)
Dept: MAMMOGRAPHY | Facility: CLINIC | Age: 72
End: 2022-07-14
Attending: INTERNAL MEDICINE
Payer: COMMERCIAL

## 2022-07-14 DIAGNOSIS — Z12.31 VISIT FOR SCREENING MAMMOGRAM: ICD-10-CM

## 2022-07-14 PROCEDURE — 77067 SCR MAMMO BI INCL CAD: CPT | Mod: TC | Performed by: RADIOLOGY

## 2022-07-14 PROCEDURE — 77063 BREAST TOMOSYNTHESIS BI: CPT | Mod: TC | Performed by: RADIOLOGY

## 2022-08-01 DIAGNOSIS — I10 BENIGN ESSENTIAL HYPERTENSION: ICD-10-CM

## 2022-08-03 RX ORDER — LISINOPRIL 20 MG/1
TABLET ORAL
Qty: 180 TABLET | Refills: 2 | Status: SHIPPED | OUTPATIENT
Start: 2022-08-03 | End: 2023-05-01

## 2022-08-03 NOTE — TELEPHONE ENCOUNTER
Prescription approved per [facility] Refill Protocol.  Liz Caban, RN  [facility] Triage Nurse

## 2022-08-15 DIAGNOSIS — I10 BENIGN ESSENTIAL HYPERTENSION: ICD-10-CM

## 2022-08-17 RX ORDER — SPIRONOLACTONE 25 MG/1
TABLET ORAL
Qty: 90 TABLET | Refills: 2 | Status: SHIPPED | OUTPATIENT
Start: 2022-08-17 | End: 2023-05-08

## 2022-08-17 NOTE — TELEPHONE ENCOUNTER
Prescription approved per [facility] Refill Protocol.  Liz Caban, RN  [facility] Triage Nurse

## 2022-08-22 DIAGNOSIS — I25.10 CORONARY ARTERY DISEASE INVOLVING NATIVE CORONARY ARTERY OF NATIVE HEART WITHOUT ANGINA PECTORIS: ICD-10-CM

## 2022-08-22 DIAGNOSIS — I10 BENIGN ESSENTIAL HYPERTENSION: ICD-10-CM

## 2022-08-25 RX ORDER — ATENOLOL 100 MG/1
TABLET ORAL
Qty: 90 TABLET | Refills: 2 | Status: SHIPPED | OUTPATIENT
Start: 2022-08-25 | End: 2023-05-22

## 2022-08-25 NOTE — TELEPHONE ENCOUNTER
Prescription approved per [facility] Refill Protocol.  Girma Ramirez RN  [facility] Triage Nurse

## 2022-10-17 DIAGNOSIS — I10 BENIGN ESSENTIAL HYPERTENSION: ICD-10-CM

## 2022-10-17 DIAGNOSIS — I25.10 CORONARY ARTERY DISEASE INVOLVING NATIVE CORONARY ARTERY OF NATIVE HEART WITHOUT ANGINA PECTORIS: ICD-10-CM

## 2022-10-19 RX ORDER — AMLODIPINE BESYLATE 10 MG/1
TABLET ORAL
Qty: 45 TABLET | Refills: 1 | Status: SHIPPED | OUTPATIENT
Start: 2022-10-19 | End: 2023-04-10

## 2022-10-19 RX ORDER — ATORVASTATIN CALCIUM 40 MG/1
TABLET, FILM COATED ORAL
Qty: 90 TABLET | Refills: 0 | Status: SHIPPED | OUTPATIENT
Start: 2022-10-19 | End: 2023-01-23

## 2022-10-19 NOTE — TELEPHONE ENCOUNTER
Statins Protocol Failed 10/17/2022 05:31 AM   Protocol Details  LDL on file in past 12 months     LDL Cholesterol Calculated   Date Value Ref Range Status   09/16/2021 85 <=100 mg/dL Final   09/10/2020 85 <100 mg/dL Final     Comment:     Desirable:       <100 mg/dl     Needs updated LDL. Had preop 6/20/22 but no LDL.    Creatinine   Date Value Ref Range Status   06/20/2022 0.87 0.52 - 1.04 mg/dL Final   09/10/2020 0.92 0.52 - 1.04 mg/dL Final     BP Readings from Last 3 Encounters:   06/20/22 130/76   05/07/22 (!) 142/83   05/04/22 (!) 145/107     Refilled Norvasc per  [facility] protocol.    Has upcoming appointment 2/24/2023.  Please refill  Lipitor as appropriate.Not RN protocol.  Thank you,    Enriqueta Jackson RN  [facility]

## 2022-11-18 ENCOUNTER — E-VISIT (OUTPATIENT)
Dept: INTERNAL MEDICINE | Facility: CLINIC | Age: 72
End: 2022-11-18
Payer: COMMERCIAL

## 2022-11-18 DIAGNOSIS — Z20.822 CLOSE EXPOSURE TO 2019 NOVEL CORONAVIRUS: Primary | ICD-10-CM

## 2022-11-18 PROCEDURE — 99421 OL DIG E/M SVC 5-10 MIN: CPT | Mod: CS | Performed by: INTERNAL MEDICINE

## 2022-11-18 NOTE — PATIENT INSTRUCTIONS
Dear Liz,    Based on your exposure to COVID-19 (coronavirus), we would like to test you for this virus. I have placed an order for this test. The best time for testing is 5-7 days after the exposure.    How to schedule:  Go to your [employer] home page and scroll down to the section that says  You have an appointment that needs to be scheduled  and click the large green button that says  Schedule Now  and follow the steps to find the next available opening.     If you are unable to complete these [employer] scheduling steps, please call [phone] to schedule your testing.     Return to work/school/ guidance:   Please let your workplace manager and staffing office know when your quarantine ends. We cannot give you an exact date as it depends on the information below. You can calculate this on your own or work with your manager/staffing office to calculate this.  These guidelines are for quarantining before returning to work, school or .     For employers, schools and day cares: This is an official notice for this person s medical guidelines for returning in-person.     For health care sites: The CDC gives different isolation and quarantine guidelines for healthcare sites, please check with these sites before arriving.     How and when do I quarantine?  You quarantine when you may have been exposed to the virus and DON T have symptoms.     Stay home and away from others.     You must quarantine for 5 days after your last contact with a person who has COVID.  o Note: If you are up to date with vaccines, you don t need to quarantine. You should still follow the steps below.     Wear a mask for 10 full days any time you re around others.    Get tested at least 5 days after you were exposed, even if you don t have symptoms.     If you start to have symptoms, isolate right away and get tested.    What are the symptoms of COVID-19?  Symptoms can include fever, cough, shortness of breath, chills, headache,  muscle pain, sore throat, fatigue, runny or stuffy nose, and loss of taste and smell. Other less common symptoms include nausea, vomiting, or diarrhea (watery stools).    If you develop symptoms, follow these guidelines:    If you're normally healthy: Please start another eVisit.    If you have a serious health problem (like cancer, heart failure, an organ transplant or kidney disease): Call your specialty clinic. Let them know that you might have COVID-19.    Where can I get more information?    [facility] - About COVID-19: [email or web address]     CDC - What to Do If You're Sick: https://www.cdc.gov/coronavirus/2019-ncov/if-you-are-sick/index.html     CDC - Quarantine & Isolation: https://www.cdc.gov/coronavirus/2019-ncov/your-health/quarantine-isolation.html     [facility] clinical trials (COVID-19 research studies): [email or web address]-clinical-trials    Below are the COVID-19 hotlines at the [facility] ([facility]). Interpreters are available.  o For health questions: Call [phone] or 1-169.434.5825 (7 a.m. to 7 p.m.)  o For questions about schools and childcare: Call 547-322-2011 or 1-974.249.5197 (7 a.m. to 7 p.m.)

## 2022-11-19 ENCOUNTER — OFFICE VISIT (OUTPATIENT)
Dept: URGENT CARE | Facility: URGENT CARE | Age: 72
End: 2022-11-19
Payer: COMMERCIAL

## 2022-11-19 VITALS
WEIGHT: 141 LBS | SYSTOLIC BLOOD PRESSURE: 135 MMHG | BODY MASS INDEX: 26.64 KG/M2 | OXYGEN SATURATION: 99 % | DIASTOLIC BLOOD PRESSURE: 81 MMHG | TEMPERATURE: 97.9 F | RESPIRATION RATE: 16 BRPM | HEART RATE: 65 BPM

## 2022-11-19 DIAGNOSIS — J01.10 ACUTE NON-RECURRENT FRONTAL SINUSITIS: Primary | ICD-10-CM

## 2022-11-19 PROCEDURE — 99213 OFFICE O/P EST LOW 20 MIN: CPT | Performed by: NURSE PRACTITIONER

## 2022-11-19 RX ORDER — DOXYCYCLINE 100 MG/1
100 CAPSULE ORAL 2 TIMES DAILY
Qty: 20 CAPSULE | Refills: 0 | Status: SHIPPED | OUTPATIENT
Start: 2022-11-19 | End: 2022-11-29

## 2022-11-19 NOTE — PROGRESS NOTES
Chief Complaint   Patient presents with     Urgent Care     Sinus infection for 2 weeks now, last covid test was yesterday and result is negative          ICD-10-CM    1. Acute non-recurrent frontal sinusitis  J01.10 doxycycline hyclate (VIBRAMYCIN) 100 MG capsule      Antibiotics as prescribed, nasal irrigation.  Tylenol as needed for pain recheck in 10 days if symptoms have not completely improved.      Subjective     Liz Haider is an 71 year old female who presents to clinic today for ST, cough, post nasal drip, fACIAL PRESSURE, headaches       ROS: 10 point ROS neg other than the symptoms noted above in the HPI.       Objective    /81   Pulse 65   Temp 97.9  F (36.6  C)   Resp 16   Wt 64 kg (141 lb)   LMP  (LMP Unknown)   SpO2 99%   BMI 26.64 kg/m    Nurses notes and VS have been reviewed.    Physical Exam       GENERAL APPEARANCE: alert and mild distress     EYES: PERRL, EOMI, sclera non-icteric     HENT: Purulent yellow drainage in nasal passages, tenderness over the frontal and maxillary sinuses, bilateral tympanic membranes have fluid levels present, no erythema and canals appear normal, postnasal drip and mild pharyngeal erythema is also noted     NECK: no adenopathy or asymmetry, thyroid normal to palpation     RESP: lungs clear to auscultation - no rales, rhonchi or wheezes     MS: extremities normal- no gross deformities noted; normal muscle tone.     SKIN: no suspicious lesions or rashes     PSYCH: normal thought process; no significant mood disturbance    Patient Instructions   Use Netti pot twice a day for the next 3-5 days. Use distilled water and the packets of powder included with the pot.    Take Sudafed (Pseudoephredine) 30mg every 6 hours while awake for the next 3-5 days. Do not take this if you have a history of high blood pressure or take medications for high blood pressure.    Take Tylenol or Ibuprofen as needed for fever or pain.    Drink Plenty of water and rest.        PRIETO Novak, CNP  [facility] Provider    The use of [employer] dictation services may have been used to construct the content in this note; any grammatical or spelling errors are non-intentional. Please contact the author of this note directly if you are in need of any clarification.

## 2022-11-19 NOTE — PATIENT INSTRUCTIONS
Use Netti pot twice a day for the next 3-5 days. Use distilled water and the packets of powder included with the pot.    Take Sudafed (Pseudoephredine) 30mg every 6 hours while awake for the next 3-5 days. Do not take this if you have a history of high blood pressure or take medications for high blood pressure.    Take Tylenol or Ibuprofen as needed for fever or pain.    Drink Plenty of water and rest.

## 2022-11-26 ENCOUNTER — HEALTH MAINTENANCE LETTER (OUTPATIENT)
Age: 72
End: 2022-11-26

## 2023-01-20 DIAGNOSIS — I25.10 CORONARY ARTERY DISEASE INVOLVING NATIVE CORONARY ARTERY OF NATIVE HEART WITHOUT ANGINA PECTORIS: ICD-10-CM

## 2023-01-23 RX ORDER — ATORVASTATIN CALCIUM 40 MG/1
TABLET, FILM COATED ORAL
Qty: 90 TABLET | Refills: 0 | Status: SHIPPED | OUTPATIENT
Start: 2023-01-23 | End: 2023-04-19

## 2023-02-24 ENCOUNTER — OFFICE VISIT (OUTPATIENT)
Dept: DERMATOLOGY | Facility: CLINIC | Age: 73
End: 2023-02-24
Payer: COMMERCIAL

## 2023-02-24 DIAGNOSIS — L81.4 LENTIGO: ICD-10-CM

## 2023-02-24 DIAGNOSIS — L82.1 SEBORRHEIC KERATOSIS: ICD-10-CM

## 2023-02-24 DIAGNOSIS — L65.9 HYPOTRICHOSIS: Primary | ICD-10-CM

## 2023-02-24 DIAGNOSIS — D48.5 NEOPLASM OF UNCERTAIN BEHAVIOR OF SKIN: ICD-10-CM

## 2023-02-24 DIAGNOSIS — D18.01 ANGIOMA OF SKIN: ICD-10-CM

## 2023-02-24 DIAGNOSIS — D22.9 NEVUS: ICD-10-CM

## 2023-02-24 DIAGNOSIS — Z85.828 HISTORY OF BASAL CELL CARCINOMA (BCC): ICD-10-CM

## 2023-02-24 PROCEDURE — 11102 TANGNTL BX SKIN SINGLE LES: CPT | Performed by: PHYSICIAN ASSISTANT

## 2023-02-24 PROCEDURE — 88305 TISSUE EXAM BY PATHOLOGIST: CPT | Performed by: PATHOLOGY

## 2023-02-24 PROCEDURE — 99213 OFFICE O/P EST LOW 20 MIN: CPT | Mod: 25 | Performed by: PHYSICIAN ASSISTANT

## 2023-02-24 RX ORDER — BIMATOPROST 3 UG/ML
1 SOLUTION TOPICAL AT BEDTIME
Qty: 5 ML | Refills: 11 | Status: SHIPPED | OUTPATIENT
Start: 2023-02-24 | End: 2024-02-27

## 2023-02-24 NOTE — PROGRESS NOTES
HPI:   Chief complaints: Liz Haider is a pleasant 72 year old female who presents for Full skin cancer screening to rule out skin cancer   Last Skin Exam: 1 year ago      1st Baseline: no  Personal HX of Skin Cancer: yes BCC   Personal HX of Malignant Melanoma: no   Family HX of Skin Cancer / Malignant Melanoma: no  Personal HX of Atypical Moles:   no  Risk factors: history of sun exposure and burns  New / Changing lesions:yes spot on the right ear - has been treated with LN2 before and will not heal  Social History:   On review of systems, there are no further skin complaints, patient is feeling otherwise well.   ROS of the following were done and are negative: Constitutional, Eyes, Ears, Nose,   Mouth, Throat, Cardiovascular, Respiratory, GI, Genitourinary, Musculoskeletal,   Psychiatric, Endocrine, Allergic/Immunologic.    PHYSICAL EXAM:   LMP  (LMP Unknown)   Skin exam performed as follows: Type 2 skin. Mood appropriate  Alert and Oriented X 3. Well developed, well nourished in no distress.  General appearance: Normal  Head including face: Normal  Eyes: conjunctiva and lids: Normal  Mouth: Lips, teeth, gums: Normal  Neck: Normal  Chest-breast/axillae: Normal  Back: Normal  Spleen and liver: Normal  Cardiovascular: Exam of peripheral vascular system by observation for swelling, varicosities, edema: Normal  Genitalia: groin, buttocks: Normal  Extremities: digits/nails (clubbing): Normal  Eccrine and Apocrine glands: Normal  Right upper extremity: Normal  Left upper extremity: Normal  Right lower extremity: Normal  Left lower extremity: Normal  Skin: Scalp and body hair: See below    Pt deferred exam of breasts, groin, buttocks: No    Other physical findings:  1. Multiple pigmented macules on extremities and trunk  2. Multiple pigmented macules on face, trunk and extremities  3. Multiple vascular papules on trunk, arms and legs  4. Multiple scattered keratotic plaques  5. 4 mm hyperkeratotic papule on the right  helix x 1       Except as noted above, no other signs of skin cancer or melanoma.     ASSESSMENT/PLAN:   Benign Full skin cancer screening today. . Patient with history of BCC  Advised on monthly self exams and 1 year  Patient Education: Appropriate brochures given.    1. Multiple benign appearing melanocytic nevi on arms, legs and trunk. Discussed ABCDEs of melanoma and sunscreen.   2. Multiple lentigos on arms, legs and trunk. Advised benign, no treatment needed.  3. Multiple scattered angiomas. Advised benign, no treatment needed.   4. Seborrheic keratosis on arms, legs and trunk. Advised benign, no treatment needed.  5. R/o SCC on the right helix. Shave biopsy performed.  Area cleaned and anesthetized with 1% lidocaine with epinephrine.  Dermablade used to remove the lesion and sent to pathology. Bleeding was cauterized. Pt tolerated procedure well with no complications.   6. Hypotrichosis   --Start Latisse daily            Follow-up: pending path    1.) Patient was asked about new and changing moles. YES  2.) Patient received a complete physical skin examination: YES  3.) Patient was counseled to perform a monthly self skin examination: YES  Scribed By: Reanna Duarte MS, PA-C

## 2023-02-24 NOTE — LETTER
2/24/2023         RE: Liz Haider  [address]  [facility] [address]        Dear Colleague,    Thank you for referring your patient, Liz Haider, to the [facility]. Please see a copy of my visit note below.    HPI:   Chief complaints: Liz Haider is a pleasant 72 year old female who presents for Full skin cancer screening to rule out skin cancer   Last Skin Exam: 1 year ago      1st Baseline: no  Personal HX of Skin Cancer: yes BCC   Personal HX of Malignant Melanoma: no   Family HX of Skin Cancer / Malignant Melanoma: no  Personal HX of Atypical Moles:   no  Risk factors: history of sun exposure and burns  New / Changing lesions:yes spot on the right ear - has been treated with LN2 before and will not heal  Social History:   On review of systems, there are no further skin complaints, patient is feeling otherwise well.   ROS of the following were done and are negative: Constitutional, Eyes, Ears, Nose,   Mouth, Throat, Cardiovascular, Respiratory, GI, Genitourinary, Musculoskeletal,   Psychiatric, Endocrine, Allergic/Immunologic.    PHYSICAL EXAM:   LMP  (LMP Unknown)   Skin exam performed as follows: Type 2 skin. Mood appropriate  Alert and Oriented X 3. Well developed, well nourished in no distress.  General appearance: Normal  Head including face: Normal  Eyes: conjunctiva and lids: Normal  Mouth: Lips, teeth, gums: Normal  Neck: Normal  Chest-breast/axillae: Normal  Back: Normal  Spleen and liver: Normal  Cardiovascular: Exam of peripheral vascular system by observation for swelling, varicosities, edema: Normal  Genitalia: groin, buttocks: Normal  Extremities: digits/nails (clubbing): Normal  Eccrine and Apocrine glands: Normal  Right upper extremity: Normal  Left upper extremity: Normal  Right lower extremity: Normal  Left lower extremity: Normal  Skin: Scalp and body hair: See below    Pt deferred exam of breasts, groin, buttocks: No    Other physical  findings:  1. Multiple pigmented macules on extremities and trunk  2. Multiple pigmented macules on face, trunk and extremities  3. Multiple vascular papules on trunk, arms and legs  4. Multiple scattered keratotic plaques  5. 4 mm hyperkeratotic papule on the right helix x 1       Except as noted above, no other signs of skin cancer or melanoma.     ASSESSMENT/PLAN:   Benign Full skin cancer screening today. . Patient with history of BCC  Advised on monthly self exams and 1 year  Patient Education: Appropriate brochures given.    1. Multiple benign appearing melanocytic nevi on arms, legs and trunk. Discussed ABCDEs of melanoma and sunscreen.   2. Multiple lentigos on arms, legs and trunk. Advised benign, no treatment needed.  3. Multiple scattered angiomas. Advised benign, no treatment needed.   4. Seborrheic keratosis on arms, legs and trunk. Advised benign, no treatment needed.  5. R/o SCC on the right helix. Shave biopsy performed.  Area cleaned and anesthetized with 1% lidocaine with epinephrine.  Dermablade used to remove the lesion and sent to pathology. Bleeding was cauterized. Pt tolerated procedure well with no complications.   6. Hypotrichosis   --Start Latisse daily            Follow-up: pending path    1.) Patient was asked about new and changing moles. YES  2.) Patient received a complete physical skin examination: YES  3.) Patient was counseled to perform a monthly self skin examination: YES  Scribed By: Reanna Duarte MS, PAJOYCE          Again, thank you for allowing me to participate in the care of your patient.        Sincerely,        Reanna Duarte PA-C

## 2023-02-28 ENCOUNTER — TELEPHONE (OUTPATIENT)
Dept: DERMATOLOGY | Facility: CLINIC | Age: 73
End: 2023-02-28
Payer: COMMERCIAL

## 2023-02-28 LAB
PATH REPORT.COMMENTS IMP SPEC: NORMAL
PATH REPORT.FINAL DX SPEC: NORMAL
PATH REPORT.GROSS SPEC: NORMAL
PATH REPORT.MICROSCOPIC SPEC OTHER STN: NORMAL
PATH REPORT.RELEVANT HX SPEC: NORMAL

## 2023-03-07 ENCOUNTER — OFFICE VISIT (OUTPATIENT)
Dept: DERMATOLOGY | Facility: CLINIC | Age: 73
End: 2023-03-07
Payer: COMMERCIAL

## 2023-03-07 DIAGNOSIS — L57.0 ACTINIC KERATOSIS: Primary | ICD-10-CM

## 2023-03-07 PROCEDURE — 17000 DESTRUCT PREMALG LESION: CPT | Performed by: PHYSICIAN ASSISTANT

## 2023-03-07 NOTE — LETTER
3/7/2023         RE: Liz Haider  [address]  [facility] [address]        Dear Colleague,    Thank you for referring your patient, Liz Haider, to the [facility]. Please see a copy of my visit note below.    HPI:   Chief complaints: Liz Haider is a 72 year old female who presents for treatment of a biopsy proven actinic keratosis on the right helix        PHYSICAL EXAM:    LMP  (LMP Unknown)   Skin exam performed as follows: Type 2 skin. Mood appropriate  Alert and Oriented X 3. Well developed, well nourished in no distress.  General appearance: Normal  Head including face: Normal  Eyes: conjunctiva and lids: Normal  Mouth: Lips, teeth, gums: Normal  Neck: Normal  Chest-breast/axillae: Normal  Back: Normal  Spleen and liver: Normal  Cardiovascular: Exam of peripheral vascular system by observation for swelling, varicosities, edema: Normal  Genitalia: groin, buttocks: Normal  Extremities: digits/nails (clubbing): Normal  Eccrine and Apocrine glands: Normal  Right upper extremity: Normal  Left upper extremity: Normal  Right lower extremity: Normal  Left lower extremity: Normal  Skin: Scalp and body hair: See below    1. Pink gritty papule on the right helix    ASSESSMENT/PLAN:     1. Biopsy proven actinic keratosis on right helix. As precancerous, cryosurgery performed. Advised on blistering and post-op care. Advised if not resolved in 1-2 months to return for evaluation        Follow-up: FSE/PRN sooner  CC:   Scribed By: Reanna Duarte, MS, PA-C          Again, thank you for allowing me to participate in the care of your patient.        Sincerely,        Reanna Duarte PA-C

## 2023-03-07 NOTE — PROGRESS NOTES
HPI:   Chief complaints: Liz Haider is a 72 year old female who presents for treatment of a biopsy proven actinic keratosis on the right helix        PHYSICAL EXAM:    LMP  (LMP Unknown)   Skin exam performed as follows: Type 2 skin. Mood appropriate  Alert and Oriented X 3. Well developed, well nourished in no distress.  General appearance: Normal  Head including face: Normal  Eyes: conjunctiva and lids: Normal  Mouth: Lips, teeth, gums: Normal  Neck: Normal  Chest-breast/axillae: Normal  Back: Normal  Spleen and liver: Normal  Cardiovascular: Exam of peripheral vascular system by observation for swelling, varicosities, edema: Normal  Genitalia: groin, buttocks: Normal  Extremities: digits/nails (clubbing): Normal  Eccrine and Apocrine glands: Normal  Right upper extremity: Normal  Left upper extremity: Normal  Right lower extremity: Normal  Left lower extremity: Normal  Skin: Scalp and body hair: See below    1. Pink gritty papule on the right helix    ASSESSMENT/PLAN:     1. Biopsy proven actinic keratosis on right helix. As precancerous, cryosurgery performed. Advised on blistering and post-op care. Advised if not resolved in 1-2 months to return for evaluation        Follow-up: FSE/PRN sooner  CC:   Scribed By: Reanna Duarte, MS, PA-C

## 2023-04-10 DIAGNOSIS — I10 BENIGN ESSENTIAL HYPERTENSION: ICD-10-CM

## 2023-04-10 RX ORDER — AMLODIPINE BESYLATE 10 MG/1
TABLET ORAL
Qty: 45 TABLET | Refills: 0 | Status: SHIPPED | OUTPATIENT
Start: 2023-04-10 | End: 2023-06-30

## 2023-04-19 ENCOUNTER — OFFICE VISIT (OUTPATIENT)
Dept: INTERNAL MEDICINE | Facility: CLINIC | Age: 73
End: 2023-04-19
Payer: COMMERCIAL

## 2023-04-19 VITALS
TEMPERATURE: 97.9 F | DIASTOLIC BLOOD PRESSURE: 68 MMHG | HEART RATE: 65 BPM | BODY MASS INDEX: 27.2 KG/M2 | WEIGHT: 144.1 LBS | HEIGHT: 61 IN | SYSTOLIC BLOOD PRESSURE: 112 MMHG | RESPIRATION RATE: 17 BRPM | OXYGEN SATURATION: 94 %

## 2023-04-19 DIAGNOSIS — E55.9 VITAMIN D DEFICIENCY: ICD-10-CM

## 2023-04-19 DIAGNOSIS — I25.10 CORONARY ARTERY DISEASE INVOLVING NATIVE CORONARY ARTERY OF NATIVE HEART WITHOUT ANGINA PECTORIS: ICD-10-CM

## 2023-04-19 DIAGNOSIS — R73.01 IMPAIRED FASTING GLUCOSE: ICD-10-CM

## 2023-04-19 DIAGNOSIS — Z13.1 SCREENING FOR DIABETES MELLITUS: ICD-10-CM

## 2023-04-19 DIAGNOSIS — Z00.00 MEDICARE ANNUAL WELLNESS VISIT, SUBSEQUENT: Primary | ICD-10-CM

## 2023-04-19 DIAGNOSIS — Z78.0 ASYMPTOMATIC MENOPAUSE: ICD-10-CM

## 2023-04-19 DIAGNOSIS — Z12.11 SPECIAL SCREENING FOR MALIGNANT NEOPLASMS, COLON: ICD-10-CM

## 2023-04-19 DIAGNOSIS — F33.42 RECURRENT MAJOR DEPRESSIVE DISORDER, IN FULL REMISSION (H): ICD-10-CM

## 2023-04-19 PROBLEM — L40.9 PSORIASIS: Status: ACTIVE | Noted: 2023-04-19

## 2023-04-19 LAB
ALBUMIN SERPL BCG-MCNC: 4.5 G/DL (ref 3.5–5.2)
ALP SERPL-CCNC: 55 U/L (ref 35–104)
ALT SERPL W P-5'-P-CCNC: 21 U/L (ref 10–35)
ANION GAP SERPL CALCULATED.3IONS-SCNC: 15 MMOL/L (ref 7–15)
AST SERPL W P-5'-P-CCNC: 26 U/L (ref 10–35)
BILIRUB SERPL-MCNC: 0.5 MG/DL
BUN SERPL-MCNC: 11.3 MG/DL (ref 8–23)
CALCIUM SERPL-MCNC: 9.8 MG/DL (ref 8.8–10.2)
CHLORIDE SERPL-SCNC: 99 MMOL/L (ref 98–107)
CHOLEST SERPL-MCNC: 183 MG/DL
CREAT SERPL-MCNC: 0.94 MG/DL (ref 0.51–0.95)
DEPRECATED CALCIDIOL+CALCIFEROL SERPL-MC: 62 UG/L (ref 20–75)
DEPRECATED HCO3 PLAS-SCNC: 23 MMOL/L (ref 22–29)
GFR SERPL CREATININE-BSD FRML MDRD: 63 ML/MIN/1.73M2
GLUCOSE SERPL-MCNC: 103 MG/DL (ref 70–99)
HBA1C MFR BLD: 5.7 % (ref 0–5.6)
HDLC SERPL-MCNC: 78 MG/DL
LDLC SERPL CALC-MCNC: 91 MG/DL
NONHDLC SERPL-MCNC: 105 MG/DL
POTASSIUM SERPL-SCNC: 4.4 MMOL/L (ref 3.4–5.3)
PROT SERPL-MCNC: 6.8 G/DL (ref 6.4–8.3)
SODIUM SERPL-SCNC: 137 MMOL/L (ref 136–145)
TRIGL SERPL-MCNC: 70 MG/DL

## 2023-04-19 PROCEDURE — 99213 OFFICE O/P EST LOW 20 MIN: CPT | Mod: 25 | Performed by: INTERNAL MEDICINE

## 2023-04-19 PROCEDURE — 36415 COLL VENOUS BLD VENIPUNCTURE: CPT | Performed by: INTERNAL MEDICINE

## 2023-04-19 PROCEDURE — 99207 PR ANNUAL WELLNESS VISIT, PPS, SUBSEQUENT STAT: CPT | Performed by: INTERNAL MEDICINE

## 2023-04-19 PROCEDURE — 80053 COMPREHEN METABOLIC PANEL: CPT | Performed by: INTERNAL MEDICINE

## 2023-04-19 PROCEDURE — 82306 VITAMIN D 25 HYDROXY: CPT | Performed by: INTERNAL MEDICINE

## 2023-04-19 PROCEDURE — 80061 LIPID PANEL: CPT | Performed by: INTERNAL MEDICINE

## 2023-04-19 PROCEDURE — 83036 HEMOGLOBIN GLYCOSYLATED A1C: CPT | Performed by: INTERNAL MEDICINE

## 2023-04-19 RX ORDER — ATORVASTATIN CALCIUM 40 MG/1
40 TABLET, FILM COATED ORAL DAILY
Qty: 90 TABLET | Refills: 3 | Status: SHIPPED | OUTPATIENT
Start: 2023-04-19 | End: 2024-04-19

## 2023-04-19 ASSESSMENT — ENCOUNTER SYMPTOMS
DIARRHEA: 0
NERVOUS/ANXIOUS: 1
ABDOMINAL PAIN: 0
DIZZINESS: 0
HEMATURIA: 0
SHORTNESS OF BREATH: 0
WEAKNESS: 0
MYALGIAS: 0
EYE PAIN: 0
NAUSEA: 0
ARTHRALGIAS: 1
DYSURIA: 0
JOINT SWELLING: 0
FEVER: 0
SORE THROAT: 0
HEMATOCHEZIA: 0
COUGH: 0
FREQUENCY: 0
BREAST MASS: 0
HEARTBURN: 1
PARESTHESIAS: 0
CHILLS: 0
CONSTIPATION: 0
PALPITATIONS: 0
HEADACHES: 0

## 2023-04-19 ASSESSMENT — ACTIVITIES OF DAILY LIVING (ADL): CURRENT_FUNCTION: NO ASSISTANCE NEEDED

## 2023-04-19 ASSESSMENT — PATIENT HEALTH QUESTIONNAIRE - PHQ9
10. IF YOU CHECKED OFF ANY PROBLEMS, HOW DIFFICULT HAVE THESE PROBLEMS MADE IT FOR YOU TO DO YOUR WORK, TAKE CARE OF THINGS AT HOME, OR GET ALONG WITH OTHER PEOPLE: SOMEWHAT DIFFICULT
SUM OF ALL RESPONSES TO PHQ QUESTIONS 1-9: 6
SUM OF ALL RESPONSES TO PHQ QUESTIONS 1-9: 6

## 2023-04-19 NOTE — PROGRESS NOTES
ASSESSMENT/PLAN                                                       (Z00.00) Medicare annual wellness visit, subsequent  (primary encounter diagnosis)  Comment: PMH, PSH, FH, SH, medications, allergies, immunizations, and preventative health measures reviewed and updated as appropriate.  Plan: see below for plans.      (R73.01) Impaired fasting glucose  (I25.10) Coronary artery disease involving native coronary artery of native heart without angina pectoris  (Z13.1) Screening for diabetes mellitus  (E55.9) Vitamin D deficiency  Plan: fasting labs today.     (Z78.0) Asymptomatic menopause  Plan: DEXA ordered - patient to schedule.     (F33.42) Recurrent major depressive disorder, in full remission (H)  Comment: well-controlled on current regimen.      (Z12.11) Special screening for malignant neoplasms, colon  Plan: screening colonoscopy ordered - patient to schedule.     Sydnee Miller MD   [address] [address]  T: [phone], F: [phone]    SUBJECTIVE                                                      Liz Haider is a very pleasant 72 year old female who presents for a physical.    ROS:  Constitutional: no unintentional weight loss or gain reported; no fevers, chills, or sweats reported  Cardiovascular: no chest pain, palpitations, or edema reported  Respiratory: no cough, wheezing, shortness of breath, or dyspnea on exertion reported  Gastrointestinal: no nausea, vomiting, constipation, diarrhea, or abdominal pain reported  Genitourinary: no urinary frequency, urgency, dysuria, or hematuria reported  Integumentary: no rash or pruritus reported  Musculoskeletal: no back pain, muscle pain, joint pain, or joint swelling reported  Neurologic: no focal weakness, numbness, or tingling reported  Hematologic: no easy bruising or bleeding reported  Endocrine: no heat or cold intolerance reported; no polyuria or polydipsia reported  Psychiatric:see PMH below    Past  Medical History:   Diagnosis Date     Acid reflux disease      Benign essential hypertension      CAD (coronary artery disease) 2006    [facility]; acute NSTEMI, severe single vessel disease in RCA, PTCA/EUGENIO     VISHAL (generalized anxiety disorder)      History of alcohol abuse     sober since 1987     History of basal cell carcinoma     multiple around right eye     Impaired fasting glucose      MDD (major depressive disorder)      Microscopic hematuria     multiple, negative work-ups     Osteopenia      Psoriasis      Past Surgical History:   Procedure Laterality Date     COLONOSCOPY  04/20/2012    Procedure:COLONOSCOPY; COLONOSCOPY; Surgeon:EDSON SHELLEY; Location: GI     DAVINCI RECTOPEXY N/A 10/31/2018    Procedure: ROBOTIC ASSISTED XI VENTRAL RECTOPEXY (DENIS) ;  Surgeon: Kirsten Huynh MD;  Location:  OR     DAVINCI RECTOPEXY  2022    for rectal prolapse     DAVINCI SACROCOLPOPEXY, CYSTOSCOPY, COMBINED N/A 10/31/2018    Procedure: ROBOTIC ASSISTED XI SACROCOLPOPEXY, CYSTOSCOPY (CEFERINO), BILATERAL SALPINGO-OORPHORECTOMY, MID URETHRAL SLING;  Surgeon: Turner Jonas MD;  Location:  OR     DAVINCI SACROCOLPOPEXY, MIDURETHRAL SLING, CYSTOSCOPY  10/31/2018    Procedure: DAVINCI SACROCOLPOPEXY, MIDURETHRAL SLING, CYSTOSCOPY;  Surgeon: Turner Jonas MD;  Location:  OR     DAVINCI SALPINGO-OOPHORECTOMY INCLUDING BILATERAL Bilateral 10/31/2018    Procedure: DAVINCI SALPINGO-OOPHORECTOMY INCLUDING BILATERAL;  Surgeon: Turner Jonas MD;  Location:  OR     HYSTERECTOMY TOTAL ABDOMINAL  1988    for heavy periods     TONSILLECTOMY & ADENOIDECTOMY  1952     Family History   Problem Relation Age of Onset     Dementia Mother      Depression Mother      Anxiety Disorder Mother      Myocardial Infarction Father         later in life     Diabetes Type 2  Sister      Bladder Cancer Sister         smoking history     Frontotemporal dementia Sister      Abdominal Aortic Aneurysm Sister       Cirrhosis Sister         non-alcoholic     Hypertension Sister      Tongue cancer Brother      Cerebrovascular Disease No family hx of      Coronary Artery Disease Early Onset No family hx of      Breast Cancer No family hx of      Ovarian Cancer No family hx of      Colon Cancer No family hx of      Social History     Occupational History     Occupation: Retired - [employer]   Tobacco Use     Smoking status: Former     Packs/day: 1.00     Years: 10.00     Pack years: 10.00     Types: Cigarettes     Quit date: 2006     Years since quittin.8     Smokeless tobacco: Never   Vaping Use     Vaping status: Never Used   Substance and Sexual Activity     Alcohol use: No     Comment: history of alcohol abuse; sober since      Drug use: No     Sexual activity: Not Currently   Social History Narrative     (as of ).     No kids.    No formal exercise.      Allergies   Allergen Reactions     Penicillins Hives     Sulfa Drugs Hives     Current Outpatient Medications   Medication Sig     amLODIPine (NORVASC) 10 MG tablet Take 1/2 (one-half) tablet by mouth once daily     aspirin (ASA) 81 MG tablet Take 1 tablet (81 mg) by mouth daily     atenolol (TENORMIN) 100 MG tablet Take 1 tablet by mouth once daily     atorvastatin (LIPITOR) 40 MG tablet Take 1 tablet by mouth once daily     bimatoprost (LATISSE) 0.03 % external opthalmic solution Apply 1 drop topically At Bedtime     Biotin 1000 MCG CHEW Take 1,000 mcg by mouth daily     Calcium Carbonate-Vit D-Min (CALCIUM 1200) 0917-7440 MG-UNIT CHEW Take 1 chew tab by mouth daily     lisinopril (ZESTRIL) 20 MG tablet Take 1 tablet by mouth twice daily     LORazepam (ATIVAN) 0.5 MG tablet TAKE 1 TABLET BY MOUTH ONCE DAILY AS NEEDED FOR ANXIETY     omeprazole (PRILOSEC) 20 MG DR capsule Take 1 capsule by mouth once daily     sertraline (ZOLOFT) 100 MG tablet Take 1 tablet by mouth once daily     spironolactone (ALDACTONE) 25 MG tablet Take 1  "tablet by mouth once daily     vitamin D3 (CHOLECALCIFEROL) 1000 units (25 mcg) tablet Take 1 tablet (1,000 Units) by mouth daily     zolpidem (AMBIEN) 5 MG tablet TAKE 1 TABLET BY MOUTH AT BEDTIME     Immunization History   Administered Date(s) Administered     COVID-19,PF,Moderna Booster 09/09/2022     Flu 65+ Years 09/13/2018     Influenza (H1N1) 01/05/2010     Influenza (High Dose) 3 valent vaccine 09/11/2014, 09/21/2015, 09/07/2016, 08/29/2017, 09/13/2018, 09/09/2019     Influenza (IIV3) PF 10/29/1997, 11/04/2007, 10/05/2008, 10/01/2012, 09/19/2013     Influenza Vaccine 65+ (Fluzone HD) 09/09/2022     Influenza Vaccine, 6+MO IM (QUADRIVALENT W/PRESERVATIVES) 09/18/2021     Nasal Influenza Vaccine 2-49 (FluMist) 09/04/2020     Pneumo Conj 13-V (2010&after) 04/14/2015     Pneumococcal 23 valent 12/04/2007, 04/17/2018     TD,PF 7+ (Tenivac) 02/08/2006     TDAP Vaccine (Adacel) 02/09/2016     Zoster recombinant adjuvanted (SHINGRIX) 05/21/2018, 09/13/2018     Zoster vaccine, live 12/31/2008     PREVENTATIVE HEALTH                                                      BMI: within normal limits for age  Blood pressure: well-controlled on current regimen   Breast CA screening: up to date   Cervical CA screening: screening no longer indicated  Colon CA screening: DUE  Lung CA screening: patient does not meet screening criteria  Dexa: DUE  Screening cholesterol: n/a - already being treated for this condition  Screening diabetes: DUE  STD testing: not sexually active  Alcohol misuse screening: alcohol use reviewed - no intervention indicated at this time  Immunizations: reviewed; up to date     OBJECTIVE                                                      /68   Pulse 65   Temp 97.9  F (36.6  C) (Temporal)   Resp 17   Ht 1.549 m (5' 1\")   Wt 65.4 kg (144 lb 1.6 oz)   LMP  (LMP Unknown)   SpO2 94%   BMI 27.23 kg/m    Constitutional: well-appearing  Head, Ears, and Eyes: normocephalic; normal external auditory " canal and pinna; tympanic membranes visualized and normal; normal lids and conjunctivae  Neck: supple, symmetric, no thyromegaly or lymphadenopathy  Respiratory: normal respiratory effort; clear to auscultation bilaterally  Cardiovascular: regular rate and rhythm; no edema  Gastrointestinal: soft, non-tender, and non-distended; no organomegaly or masses  Musculoskeletal: normal gait and station  Psych: normal judgment and insight; normal mood and affect; recent and remote memory intact    ---  (Note was completed, in part, with [employer] voice-recognition software. Documentation was reviewed, but some grammatical, spelling, and word errors may remain.)

## 2023-04-29 DIAGNOSIS — I10 BENIGN ESSENTIAL HYPERTENSION: ICD-10-CM

## 2023-05-01 ENCOUNTER — ANCILLARY PROCEDURE (OUTPATIENT)
Dept: BONE DENSITY | Facility: CLINIC | Age: 73
End: 2023-05-01
Attending: INTERNAL MEDICINE
Payer: COMMERCIAL

## 2023-05-01 DIAGNOSIS — Z78.0 ASYMPTOMATIC MENOPAUSE: ICD-10-CM

## 2023-05-01 PROCEDURE — 77085 DXA BONE DENSITY AXL VRT FX: CPT | Performed by: INTERNAL MEDICINE

## 2023-05-01 RX ORDER — LISINOPRIL 20 MG/1
TABLET ORAL
Qty: 180 TABLET | Refills: 2 | Status: SHIPPED | OUTPATIENT
Start: 2023-05-01 | End: 2024-01-29

## 2023-05-05 ENCOUNTER — OFFICE VISIT (OUTPATIENT)
Dept: URGENT CARE | Facility: URGENT CARE | Age: 73
End: 2023-05-05
Payer: COMMERCIAL

## 2023-05-05 VITALS
OXYGEN SATURATION: 96 % | TEMPERATURE: 98.2 F | HEART RATE: 62 BPM | WEIGHT: 144 LBS | SYSTOLIC BLOOD PRESSURE: 138 MMHG | BODY MASS INDEX: 27.21 KG/M2 | DIASTOLIC BLOOD PRESSURE: 81 MMHG

## 2023-05-05 DIAGNOSIS — R05.9 COUGH, UNSPECIFIED TYPE: ICD-10-CM

## 2023-05-05 DIAGNOSIS — Z76.0 ENCOUNTER FOR MEDICATION REFILL: ICD-10-CM

## 2023-05-05 DIAGNOSIS — R09.81 CONGESTION OF PARANASAL SINUS: Primary | ICD-10-CM

## 2023-05-05 DIAGNOSIS — R09.82 POST-NASAL DRIP: ICD-10-CM

## 2023-05-05 PROCEDURE — 99213 OFFICE O/P EST LOW 20 MIN: CPT | Performed by: FAMILY MEDICINE

## 2023-05-05 RX ORDER — CETIRIZINE HYDROCHLORIDE 10 MG/1
10 TABLET ORAL DAILY
Qty: 30 TABLET | Refills: 0 | Status: SHIPPED | OUTPATIENT
Start: 2023-05-05 | End: 2024-04-18

## 2023-05-05 RX ORDER — DOXYCYCLINE 100 MG/1
100 CAPSULE ORAL 2 TIMES DAILY
Qty: 14 CAPSULE | Refills: 0 | Status: SHIPPED | OUTPATIENT
Start: 2023-05-07 | End: 2023-05-14

## 2023-05-05 RX ORDER — FLUTICASONE PROPIONATE 50 MCG
1 SPRAY, SUSPENSION (ML) NASAL DAILY
Qty: 9.9 ML | Refills: 0 | Status: SHIPPED | OUTPATIENT
Start: 2023-05-05 | End: 2024-04-18

## 2023-05-05 NOTE — PROGRESS NOTES
Chief Complaint   Patient presents with     Sinus Problem     Started a week ago.  Headache, terrible pressure in face, drippy nose, neck pain, phlegm in her throat- coughing all night. No fever. Checked for Covid it was negative.      Liz was seen today for sinus problem.    Diagnoses and all orders for this visit:    Congestion of paranasal sinus  -     cetirizine (ZYRTEC) 10 MG tablet; Take 1 tablet (10 mg) by mouth daily  -     fluticasone (FLONASE) 50 MCG/ACT nasal spray; Spray 1 spray into both nostrils daily    Cough, unspecified type    Post-nasal drip    Encounter for medication refill  -     doxycycline hyclate (VIBRAMYCIN) 100 MG capsule; Take 1 capsule (100 mg) by mouth 2 times daily for 7 days      Liz Haider is a 72 year old female who presents for evaluation of nasal discharge. On exam She has swollen nasal mucosa with clear discharge.  Given history and exam, I feel the most likely etiology is allergic rhinitis.  Will start medication as noted below for symptoms. Doubt bacterial etiology. Doubt intracerebral issues.  Certainly a viral syndrome is also a possibility.    Reviewed patient with the possibilities in details we will treat her with saline nasal spray, Flonase and also cetirizine  Patient was also given a prescription for the antibiotic take it  only if symptoms do not improve and also if drainage gets thick  after 2 to 3 days of above treatment option and also if symptoms worsen  Discussed chest is clear and no xray needed   Coughing is from the post nasal drip       SUBJECTIVE:  Liz Haider is a 72 year old female who presents to the clinic today with a chief complaint of sinus congestion, runny nose and also post nasal drip seen and also coughing mostly at nighttime symptoms are there for almost 1 week.  She also feels a lot of postnasal drip denies any fever chills body ache.  Has some sinus tenderness Her cough is described as occasional.    The patient's symptoms are moderate  and not changing over the course of time.  Associated symptoms include none. The patient's symptoms are exacerbated by lying down  Patient has been using nothing  to improve symptoms.    Past Medical History:   Diagnosis Date     Acid reflux disease      Benign essential hypertension      CAD (coronary artery disease) 2006    [facility]; acute NSTEMI, severe single vessel disease in RCA, PTCA/EUGENIO     VISHAL (generalized anxiety disorder)      History of alcohol abuse     sober since 1987     History of basal cell carcinoma     multiple around right eye     Impaired fasting glucose      MDD (major depressive disorder)      Microscopic hematuria     multiple, negative work-ups     Osteopenia      Psoriasis        Current Outpatient Medications   Medication Sig Dispense Refill     amLODIPine (NORVASC) 10 MG tablet Take 1/2 (one-half) tablet by mouth once daily 45 tablet 0     aspirin (ASA) 81 MG tablet Take 1 tablet (81 mg) by mouth daily       atenolol (TENORMIN) 100 MG tablet Take 1 tablet by mouth once daily 90 tablet 2     atorvastatin (LIPITOR) 40 MG tablet Take 1 tablet (40 mg) by mouth daily 90 tablet 3     bimatoprost (LATISSE) 0.03 % external opthalmic solution Apply 1 drop topically At Bedtime 5 mL 11     Biotin 1000 MCG CHEW Take 1,000 mcg by mouth daily       Calcium Carbonate-Vit D-Min (CALCIUM 1200) 9227-8093 MG-UNIT CHEW Take 1 chew tab by mouth daily       cetirizine (ZYRTEC) 10 MG tablet Take 1 tablet (10 mg) by mouth daily 30 tablet 0     [START ON 5/7/2023] doxycycline hyclate (VIBRAMYCIN) 100 MG capsule Take 1 capsule (100 mg) by mouth 2 times daily for 7 days 14 capsule 0     fluticasone (FLONASE) 50 MCG/ACT nasal spray Spray 1 spray into both nostrils daily 9.9 mL 0     lisinopril (ZESTRIL) 20 MG tablet Take 1 tablet by mouth twice daily 180 tablet 2     LORazepam (ATIVAN) 0.5 MG tablet TAKE 1 TABLET BY MOUTH ONCE DAILY AS NEEDED FOR ANXIETY 15 tablet 0     omeprazole (PRILOSEC) 20 MG DR capsule Take  1 capsule by mouth once daily 90 capsule 1     sertraline (ZOLOFT) 100 MG tablet Take 1 tablet by mouth once daily 90 tablet 0     spironolactone (ALDACTONE) 25 MG tablet Take 1 tablet by mouth once daily 90 tablet 2     vitamin D3 (CHOLECALCIFEROL) 1000 units (25 mcg) tablet Take 1 tablet (1,000 Units) by mouth daily       zolpidem (AMBIEN) 5 MG tablet TAKE 1 TABLET BY MOUTH AT BEDTIME 90 tablet 1       Social History     Tobacco Use     Smoking status: Former     Packs/day: 1.00     Years: 10.00     Pack years: 10.00     Types: Cigarettes     Quit date: 2006     Years since quittin.8     Smokeless tobacco: Never   Vaping Use     Vaping status: Never Used   Substance Use Topics     Alcohol use: No     Comment: history of alcohol abuse; sober since        ROS  Review of systems negative except as stated above.    OBJECTIVE:  /81   Pulse 62   Temp 98.2  F (36.8  C) (Oral)   Wt 65.3 kg (144 lb)   LMP  (LMP Unknown)   SpO2 96%   BMI 27.21 kg/m    GENERAL APPEARANCE: healthy, alert and no distress  EYES: EOMI,  PERRL, conjunctiva clear  HENT: ear canals and TM's normal.  Nose nasal mucosa edematous and mouth without ulcers, erythema or lesions  NECK: supple, nontender, no lymphadenopathy  RESP: lungs clear to auscultation - no rales, rhonchi or wheezes  CV: regular rates and rhythm, normal S1 S2, no murmur noted  PSYCH: mentation appears normal      Sabrina Raymond MD

## 2023-05-06 DIAGNOSIS — I10 BENIGN ESSENTIAL HYPERTENSION: ICD-10-CM

## 2023-05-08 RX ORDER — SPIRONOLACTONE 25 MG/1
TABLET ORAL
Qty: 90 TABLET | Refills: 2 | Status: SHIPPED | OUTPATIENT
Start: 2023-05-08 | End: 2024-01-29

## 2023-05-20 DIAGNOSIS — I25.10 CORONARY ARTERY DISEASE INVOLVING NATIVE CORONARY ARTERY OF NATIVE HEART WITHOUT ANGINA PECTORIS: ICD-10-CM

## 2023-05-20 DIAGNOSIS — I10 BENIGN ESSENTIAL HYPERTENSION: ICD-10-CM

## 2023-05-22 RX ORDER — ATENOLOL 100 MG/1
TABLET ORAL
Qty: 90 TABLET | Refills: 2 | Status: SHIPPED | OUTPATIENT
Start: 2023-05-22 | End: 2024-02-19

## 2023-06-30 DIAGNOSIS — I10 BENIGN ESSENTIAL HYPERTENSION: ICD-10-CM

## 2023-06-30 RX ORDER — AMLODIPINE BESYLATE 10 MG/1
TABLET ORAL
Qty: 45 TABLET | Refills: 2 | Status: SHIPPED | OUTPATIENT
Start: 2023-06-30 | End: 2024-04-02

## 2023-07-03 ENCOUNTER — ANCILLARY PROCEDURE (OUTPATIENT)
Dept: MAMMOGRAPHY | Facility: CLINIC | Age: 73
End: 2023-07-03
Attending: INTERNAL MEDICINE
Payer: COMMERCIAL

## 2023-07-03 DIAGNOSIS — Z12.31 VISIT FOR SCREENING MAMMOGRAM: ICD-10-CM

## 2023-07-03 PROCEDURE — 77063 BREAST TOMOSYNTHESIS BI: CPT | Mod: TC | Performed by: RADIOLOGY

## 2023-07-03 PROCEDURE — 77067 SCR MAMMO BI INCL CAD: CPT | Mod: TC | Performed by: RADIOLOGY

## 2023-07-06 ENCOUNTER — TRANSFERRED RECORDS (OUTPATIENT)
Dept: HEALTH INFORMATION MANAGEMENT | Facility: CLINIC | Age: 73
End: 2023-07-06
Payer: COMMERCIAL

## 2023-08-26 DIAGNOSIS — K21.9 GASTROESOPHAGEAL REFLUX DISEASE: ICD-10-CM

## 2023-10-08 PROBLEM — F33.42 RECURRENT MAJOR DEPRESSIVE DISORDER, IN FULL REMISSION (H): Status: RESOLVED | Noted: 2023-04-19 | Resolved: 2023-10-08

## 2023-10-09 ENCOUNTER — OFFICE VISIT (OUTPATIENT)
Dept: INTERNAL MEDICINE | Facility: CLINIC | Age: 73
End: 2023-10-09
Payer: COMMERCIAL

## 2023-10-09 VITALS
DIASTOLIC BLOOD PRESSURE: 78 MMHG | BODY MASS INDEX: 26.98 KG/M2 | WEIGHT: 142.8 LBS | HEART RATE: 63 BPM | OXYGEN SATURATION: 98 % | SYSTOLIC BLOOD PRESSURE: 126 MMHG | TEMPERATURE: 97.8 F

## 2023-10-09 DIAGNOSIS — H25.9 AGE-RELATED CATARACT OF BOTH EYES, UNSPECIFIED AGE-RELATED CATARACT TYPE: ICD-10-CM

## 2023-10-09 DIAGNOSIS — Z23 NEED FOR PROPHYLACTIC VACCINATION AND INOCULATION AGAINST INFLUENZA: ICD-10-CM

## 2023-10-09 DIAGNOSIS — Z01.818 PREOPERATIVE EXAMINATION: Primary | ICD-10-CM

## 2023-10-09 PROCEDURE — 99214 OFFICE O/P EST MOD 30 MIN: CPT | Mod: 25 | Performed by: INTERNAL MEDICINE

## 2023-10-09 PROCEDURE — G0008 ADMIN INFLUENZA VIRUS VAC: HCPCS | Performed by: INTERNAL MEDICINE

## 2023-10-09 PROCEDURE — 90662 IIV NO PRSV INCREASED AG IM: CPT | Performed by: INTERNAL MEDICINE

## 2023-10-09 ASSESSMENT — PATIENT HEALTH QUESTIONNAIRE - PHQ9
SUM OF ALL RESPONSES TO PHQ QUESTIONS 1-9: 10
10. IF YOU CHECKED OFF ANY PROBLEMS, HOW DIFFICULT HAVE THESE PROBLEMS MADE IT FOR YOU TO DO YOUR WORK, TAKE CARE OF THINGS AT HOME, OR GET ALONG WITH OTHER PEOPLE: SOMEWHAT DIFFICULT
SUM OF ALL RESPONSES TO PHQ QUESTIONS 1-9: 10

## 2023-10-09 NOTE — PROGRESS NOTES
ASSESSMENT/PLAN:                                                      Liz Haider is a pleasant 72 year old female who presents for a preoperative evaluation. She is undergoing a low risk procedure. Her risk of major cardiac event is 1 point: 0.9%.    (Z01.818) Preoperative examination  (primary encounter diagnosis)  (H25.9) Age-related cataract of both eyes, unspecified age-related cataract type  Plan: no additional work-up, cardiac or otherwise, indicated prior to surgery; no medication changes/adjustments indicated prior to surgery.    (Z23) Need for prophylactic vaccination and inoculation against influenza  Plan: flu shot given today.     RECOMMEND PROCEEDING WITH SURGERY: YES    Sydnee Miller MD   [place] [address] [address] [address]  T: [phone], F: [phone]    SUBJECTIVE:                                                      Liz Haider is a very pleasant 72 year old female who presents for preoperative evaluation.    Surgeon: Lenny  Date: 10/23/2023, 11/6/2023  Location: [facility], Fax#[phone]  Surgery: cataract surgery (low risk)  Indication: cataracts    Past Medical History:   Diagnosis Date    Acid reflux disease     Benign essential hypertension     CAD (coronary artery disease) 2006    [facility]; acute NSTEMI, severe single vessel disease in RCA, PTCA/EUGENIO    VISHAL (generalized anxiety disorder)     History of alcohol abuse     sober since 1987    History of basal cell carcinoma     multiple around right eye    Impaired fasting glucose     MDD (major depressive disorder)     Microscopic hematuria     multiple, negative work-ups    Osteopenia     Psoriasis      Personal or family history of excessive or prolonged bleeding? No  Personal or family history of blood clots? No  History of snoring/Sleep Apnea? No  History of blood transfusions? No    Cardiovascular risk: Ischemic cardiac disease (1 point)    Risk of major cardiac event: 1 point:  0.9%    Past Surgical History:   Procedure Laterality Date    COLONOSCOPY  04/20/2012    Procedure:COLONOSCOPY; COLONOSCOPY; Surgeon:EDSON SHELLEY; Location: GI    DAVINCI RECTOPEXY N/A 10/31/2018    Procedure: ROBOTIC ASSISTED XI VENTRAL RECTOPEXY (DENIS) ;  Surgeon: Kirsten Huynh MD;  Location:  OR    DAVINCI RECTOPEXY  2022    for rectal prolapse    DAVINCI SACROCOLPOPEXY, CYSTOSCOPY, COMBINED N/A 10/31/2018    Procedure: ROBOTIC ASSISTED XI SACROCOLPOPEXY, CYSTOSCOPY (CEFERINO), BILATERAL SALPINGO-OORPHORECTOMY, MID URETHRAL SLING;  Surgeon: Turner Jonas MD;  Location: [facility] OR    DAVINCI SACROCOLPOPEXY, MIDURETHRAL SLING, CYSTOSCOPY  10/31/2018    Procedure: DAVINCI SACROCOLPOPEXY, MIDURETHRAL SLING, CYSTOSCOPY;  Surgeon: Turner Jonas MD;  Location: [facility] OR    DAVINCI SALPINGO-OOPHORECTOMY INCLUDING BILATERAL Bilateral 10/31/2018    Procedure: DAVINCI SALPINGO-OOPHORECTOMY INCLUDING BILATERAL;  Surgeon: Turner Jonas MD;  Location:  OR    HYSTERECTOMY TOTAL ABDOMINAL  1988    for heavy periods    TONSILLECTOMY & ADENOIDECTOMY  1952     Artificial assistive devices, such as pacemakers, artificial cardiac valves, or artificial joints? No    Allergies   Allergen Reactions    Penicillins Hives    Sulfa Antibiotics Hives     Personal or family history of allergy to anesthesia? No  Allergy to local or topical analgesics? No  Allergy to latex? No  Allergy to adhesives/skin preparations (chlorhexadine)? No    Current Outpatient Medications   Medication Sig    amLODIPine (NORVASC) 10 MG tablet Take 1/2 (one-half) tablet by mouth once daily    aspirin (ASA) 81 MG tablet Take 1 tablet (81 mg) by mouth daily    atenolol (TENORMIN) 100 MG tablet Take 1 tablet by mouth once daily    atorvastatin (LIPITOR) 40 MG tablet Take 1 tablet (40 mg) by mouth daily    bimatoprost (LATISSE) 0.03 % external opthalmic solution Apply 1 drop topically At Bedtime    Biotin 1000 MCG CHEW Take 1,000 mcg  by mouth daily    Calcium Carbonate-Vit D-Min (CALCIUM 1200) 8545-0925 MG-UNIT CHEW Take 1 chew tab by mouth daily    cetirizine (ZYRTEC) 10 MG tablet Take 1 tablet (10 mg) by mouth daily    fluticasone (FLONASE) 50 MCG/ACT nasal spray Spray 1 spray into both nostrils daily    lisinopril (ZESTRIL) 20 MG tablet Take 1 tablet by mouth twice daily    LORazepam (ATIVAN) 0.5 MG tablet TAKE 1 TABLET BY MOUTH ONCE DAILY AS NEEDED FOR ANXIETY    omeprazole (PRILOSEC) 20 MG DR capsule Take 1 capsule by mouth once daily    sertraline (ZOLOFT) 100 MG tablet Take 1 tablet by mouth once daily    spironolactone (ALDACTONE) 25 MG tablet Take 1 tablet by mouth once daily    vitamin D3 (CHOLECALCIFEROL) 1000 units (25 mcg) tablet Take 1 tablet (1,000 Units) by mouth daily    zolpidem (AMBIEN) 5 MG tablet TAKE 1 TABLET BY MOUTH AT BEDTIME     Over the counter medications? Other than above, no  Vitamin Supplements? Other than above, no  Herbal Supplements? Other than above, no    Family History   Problem Relation Age of Onset    Dementia Mother     Depression Mother     Anxiety Disorder Mother     Myocardial Infarction Father         later in life    Diabetes Type 2  Sister     Bladder Cancer Sister         smoking history    Frontotemporal dementia Sister     Abdominal Aortic Aneurysm Sister     Cirrhosis Sister         non-alcoholic    Hypertension Sister     Tongue cancer Brother     Cerebrovascular Disease No family hx of     Coronary Artery Disease Early Onset No family hx of     Breast Cancer No family hx of     Ovarian Cancer No family hx of     Colon Cancer No family hx of      Social History     Occupational History    Occupation: Retired - [employer]   Tobacco Use    Smoking status: Former     Packs/day: 1.00     Years: 10.00     Pack years: 10.00     Types: Cigarettes     Quit date: 2006     Years since quittin.2    Smokeless tobacco: Never   Vaping Use    Vaping Use: Never used   Substance and  Sexual Activity    Alcohol use: No     Comment: history of alcohol abuse; sober since 1987    Drug use: No    Sexual activity: Not Currently   Social History Narrative     (as of 2020).     No kids.    No formal exercise.      Functional capacity: Can do light work around the house like dusting or washing dishes (4 METs)    OBJECTIVE:                                                      /78   Pulse 63   Temp 97.8  F (36.6  C)   Wt 64.8 kg (142 lb 12.8 oz)   LMP  (LMP Unknown)   SpO2 98%   BMI 26.98 kg/m    Constitutional: well-appearing  Respiratory: normal respiratory effort; clear to auscultation bilaterally  Cardiovascular: regular rate and rhythm; no edema  Gastrointestinal: soft, non-tender, non-distended, and bowel sounds present; no organomegaly or masses  Musculoskeletal: normal gait and station  Psych: normal judgment and insight; normal mood and affect    ---    (Chart documentation was completed, in part, with [employer] voice-recognition software. Even though reviewed, some grammatical, spelling, and word errors may remain.)

## 2023-10-09 NOTE — COMMUNITY RESOURCES LIST (ENGLISH)
10/09/2023   [facility]  N/A  For questions about this resource list or additional care needs, please contact your primary care clinic or care manager.  Phone: [phone]   Email: N/A   Address: [address] [place] [id number]   Hours: N/A        Financial Stability       Utility payment assistance  1  [facility] Distance: 1.08 miles      In-Person, Phone/Virtual   [address] [place] [address]  Language: English, Icelandic  Hours: Mon - Fri 9:00 AM - 4:30 PM  Fees: Free   Phone: (826) 943-8611 Ext.113 Email: [email or web address] Website: [email or web address]     2  [facility] Distance: 7.52 miles      In-Person, Phone/Virtual   [address] [place] [address]  Language: English  Hours: Mon - Thu 9:00 AM - 3:00 PM  Fees: Free   Phone: (629) 794-8489 Email: [email or web address] Website: [email or web address]          Important Numbers & Websites       Emergency Services   911  [facility] Services   311  Poison Control   (756) 236-2613  Suicide Prevention Lifeline   (474) 674-9713 (TALK)  Child Abuse Hotline   (525) 683-4957 (4-A-Child)  Sexual Assault Hotline   (962) 317-9783 (HOPE)  National Runaway Safeline   (882) 185-3239 (RUNAWAY)  All-Options Talkline   (457) 666-6110  Substance Abuse Referral   (270) 742-8603 (HELP)

## 2023-11-19 ENCOUNTER — HOSPITAL ENCOUNTER (EMERGENCY)
Facility: CLINIC | Age: 73
Discharge: HOME | End: 2023-11-19
Attending: EMERGENCY MEDICINE | Admitting: EMERGENCY MEDICINE
Payer: COMMERCIAL

## 2023-11-19 ENCOUNTER — APPOINTMENT (OUTPATIENT)
Dept: ULTRASOUND IMAGING | Facility: CLINIC | Age: 73
End: 2023-11-19
Attending: EMERGENCY MEDICINE
Payer: COMMERCIAL

## 2023-11-19 VITALS
DIASTOLIC BLOOD PRESSURE: 73 MMHG | HEART RATE: 64 BPM | SYSTOLIC BLOOD PRESSURE: 136 MMHG | OXYGEN SATURATION: 95 % | TEMPERATURE: 97.5 F | RESPIRATION RATE: 11 BRPM

## 2023-11-19 DIAGNOSIS — R10.13 EPIGASTRIC PAIN: ICD-10-CM

## 2023-11-19 DIAGNOSIS — E83.42 HYPOMAGNESEMIA: ICD-10-CM

## 2023-11-19 DIAGNOSIS — R11.0 NAUSEA WITHOUT VOMITING: ICD-10-CM

## 2023-11-19 LAB
ALBUMIN SERPL BCG-MCNC: 4.1 G/DL (ref 3.5–5.2)
ALP SERPL-CCNC: 52 U/L (ref 40–150)
ALT SERPL W P-5'-P-CCNC: 23 U/L (ref 0–50)
ANION GAP SERPL CALCULATED.3IONS-SCNC: 8 MMOL/L (ref 7–15)
AST SERPL W P-5'-P-CCNC: 24 U/L (ref 0–45)
BASOPHILS # BLD AUTO: 0 10E3/UL (ref 0–0.2)
BASOPHILS NFR BLD AUTO: 0 %
BILIRUB DIRECT SERPL-MCNC: <0.2 MG/DL (ref 0–0.3)
BILIRUB SERPL-MCNC: 0.4 MG/DL
BUN SERPL-MCNC: 6.9 MG/DL (ref 8–23)
CALCIUM SERPL-MCNC: 8.7 MG/DL (ref 8.8–10.2)
CHLORIDE SERPL-SCNC: 99 MMOL/L (ref 98–107)
CREAT SERPL-MCNC: 0.75 MG/DL (ref 0.51–0.95)
D DIMER PPP FEU-MCNC: 0.66 UG/ML FEU (ref 0–0.5)
DEPRECATED HCO3 PLAS-SCNC: 28 MMOL/L (ref 22–29)
EGFRCR SERPLBLD CKD-EPI 2021: 83 ML/MIN/1.73M2
EOSINOPHIL # BLD AUTO: 0 10E3/UL (ref 0–0.7)
EOSINOPHIL NFR BLD AUTO: 1 %
ERYTHROCYTE [DISTWIDTH] IN BLOOD BY AUTOMATED COUNT: 13.2 % (ref 10–15)
GLUCOSE SERPL-MCNC: 124 MG/DL (ref 70–99)
HCT VFR BLD AUTO: 36.9 % (ref 35–47)
HGB BLD-MCNC: 12.2 G/DL (ref 11.7–15.7)
HOLD SPECIMEN: NORMAL
HOLD SPECIMEN: NORMAL
IMM GRANULOCYTES # BLD: 0.1 10E3/UL
IMM GRANULOCYTES NFR BLD: 1 %
LIPASE SERPL-CCNC: 46 U/L (ref 13–60)
LYMPHOCYTES # BLD AUTO: 1.2 10E3/UL (ref 0.8–5.3)
LYMPHOCYTES NFR BLD AUTO: 18 %
MAGNESIUM SERPL-MCNC: 1.6 MG/DL (ref 1.7–2.3)
MCH RBC QN AUTO: 30.3 PG (ref 26.5–33)
MCHC RBC AUTO-ENTMCNC: 33.1 G/DL (ref 31.5–36.5)
MCV RBC AUTO: 92 FL (ref 78–100)
MONOCYTES # BLD AUTO: 0.5 10E3/UL (ref 0–1.3)
MONOCYTES NFR BLD AUTO: 7 %
NEUTROPHILS # BLD AUTO: 4.9 10E3/UL (ref 1.6–8.3)
NEUTROPHILS NFR BLD AUTO: 73 %
NRBC # BLD AUTO: 0 10E3/UL
NRBC BLD AUTO-RTO: 0 /100
PLATELET # BLD AUTO: 244 10E3/UL (ref 150–450)
POTASSIUM SERPL-SCNC: 4 MMOL/L (ref 3.4–5.3)
PROT SERPL-MCNC: 6.4 G/DL (ref 6.4–8.3)
RBC # BLD AUTO: 4.02 10E6/UL (ref 3.8–5.2)
SODIUM SERPL-SCNC: 135 MMOL/L (ref 135–145)
TROPONIN T SERPL HS-MCNC: 9 NG/L
TSH SERPL DL<=0.005 MIU/L-ACNC: 3.69 UIU/ML (ref 0.3–4.2)
WBC # BLD AUTO: 6.6 10E3/UL (ref 4–11)

## 2023-11-19 PROCEDURE — 85025 COMPLETE CBC W/AUTO DIFF WBC: CPT | Performed by: EMERGENCY MEDICINE

## 2023-11-19 PROCEDURE — 250N000009 HC RX 250: Performed by: EMERGENCY MEDICINE

## 2023-11-19 PROCEDURE — 80048 BASIC METABOLIC PNL TOTAL CA: CPT | Performed by: EMERGENCY MEDICINE

## 2023-11-19 PROCEDURE — 93005 ELECTROCARDIOGRAM TRACING: CPT

## 2023-11-19 PROCEDURE — 99285 EMERGENCY DEPT VISIT HI MDM: CPT | Mod: 25

## 2023-11-19 PROCEDURE — 250N000011 HC RX IP 250 OP 636: Mod: JZ | Performed by: EMERGENCY MEDICINE

## 2023-11-19 PROCEDURE — 84484 ASSAY OF TROPONIN QUANT: CPT | Performed by: EMERGENCY MEDICINE

## 2023-11-19 PROCEDURE — 96365 THER/PROPH/DIAG IV INF INIT: CPT

## 2023-11-19 PROCEDURE — 96375 TX/PRO/DX INJ NEW DRUG ADDON: CPT

## 2023-11-19 PROCEDURE — 83735 ASSAY OF MAGNESIUM: CPT | Performed by: EMERGENCY MEDICINE

## 2023-11-19 PROCEDURE — 80053 COMPREHEN METABOLIC PANEL: CPT | Performed by: EMERGENCY MEDICINE

## 2023-11-19 PROCEDURE — 83690 ASSAY OF LIPASE: CPT | Performed by: EMERGENCY MEDICINE

## 2023-11-19 PROCEDURE — 250N000013 HC RX MED GY IP 250 OP 250 PS 637: Performed by: EMERGENCY MEDICINE

## 2023-11-19 PROCEDURE — 76705 ECHO EXAM OF ABDOMEN: CPT

## 2023-11-19 PROCEDURE — 84443 ASSAY THYROID STIM HORMONE: CPT | Performed by: EMERGENCY MEDICINE

## 2023-11-19 PROCEDURE — 82248 BILIRUBIN DIRECT: CPT | Performed by: EMERGENCY MEDICINE

## 2023-11-19 PROCEDURE — 85379 FIBRIN DEGRADATION QUANT: CPT | Performed by: EMERGENCY MEDICINE

## 2023-11-19 PROCEDURE — 36415 COLL VENOUS BLD VENIPUNCTURE: CPT | Performed by: EMERGENCY MEDICINE

## 2023-11-19 RX ORDER — LIDOCAINE HYDROCHLORIDE 20 MG/ML
5 SOLUTION OROPHARYNGEAL
Status: DISCONTINUED | OUTPATIENT
Start: 2023-11-19 | End: 2023-11-19

## 2023-11-19 RX ORDER — MAGNESIUM HYDROXIDE/ALUMINUM HYDROXICE/SIMETHICONE 120; 1200; 1200 MG/30ML; MG/30ML; MG/30ML
15 SUSPENSION ORAL ONCE
Status: COMPLETED | OUTPATIENT
Start: 2023-11-19 | End: 2023-11-19

## 2023-11-19 RX ORDER — ONDANSETRON 4 MG/1
4 TABLET, ORALLY DISINTEGRATING ORAL EVERY 8 HOURS PRN
Qty: 10 TABLET | Refills: 0 | Status: SHIPPED | OUTPATIENT
Start: 2023-11-19 | End: 2024-04-18

## 2023-11-19 RX ORDER — MAGNESIUM SULFATE HEPTAHYDRATE 40 MG/ML
2 INJECTION, SOLUTION INTRAVENOUS ONCE
Status: COMPLETED | OUTPATIENT
Start: 2023-11-19 | End: 2023-11-19

## 2023-11-19 RX ORDER — LIDOCAINE HYDROCHLORIDE 20 MG/ML
15 SOLUTION OROPHARYNGEAL ONCE
Status: COMPLETED | OUTPATIENT
Start: 2023-11-19 | End: 2023-11-19

## 2023-11-19 RX ORDER — ONDANSETRON 2 MG/ML
4 INJECTION INTRAMUSCULAR; INTRAVENOUS ONCE
Status: COMPLETED | OUTPATIENT
Start: 2023-11-19 | End: 2023-11-19

## 2023-11-19 RX ADMIN — LIDOCAINE HYDROCHLORIDE 15 ML: 20 SOLUTION ORAL; TOPICAL at 10:32

## 2023-11-19 RX ADMIN — ALUMINUM HYDROXIDE, MAGNESIUM HYDROXIDE, AND SIMETHICONE 15 ML: 200; 200; 20 SUSPENSION ORAL at 10:31

## 2023-11-19 RX ADMIN — MAGNESIUM SULFATE HEPTAHYDRATE 2 G: 40 INJECTION, SOLUTION INTRAVENOUS at 10:27

## 2023-11-19 RX ADMIN — FAMOTIDINE 20 MG: 10 INJECTION, SOLUTION INTRAVENOUS at 10:23

## 2023-11-19 RX ADMIN — ONDANSETRON 4 MG: 2 INJECTION INTRAMUSCULAR; INTRAVENOUS at 10:25

## 2023-11-19 ASSESSMENT — ACTIVITIES OF DAILY LIVING (ADL)
ADLS_ACUITY_SCORE: 35
ADLS_ACUITY_SCORE: 33

## 2023-11-19 NOTE — ED TRIAGE NOTES
Pt presents to triage with c/o chest pain that she feels is related to heartburn for 1.5 weeks. Did call 911 Wednesday night, EKG normal. Nausea, no vomiting

## 2023-11-19 NOTE — ED PROVIDER NOTES
History     Chief Complaint:  Chest Pain       The history is provided by the patient.      Liz Haider is a 72 year old female with a history of CAD who presents with her sister for burning pain.  Although the triage complaint is chest pain, she tells me it is really not chest pain.  She indicates to her epigastrium and she describes burning starting there and radiating inferiorly.  This has been present for about 1.5 weeks.  She has had nausea without vomiting which she describes as particularly bothersome. Her symptoms are worse at night which wakes her up.  She has had poor appetite but describes normal bowel movements including no hematochezia or melena. Notably, she did have a positive COVID-19 test with cough and rhinorrhea couple of weeks ago but subsequent negative testing.    She does not have fever.  She does not smoke or use NSAIDs frequently.  She has tried Tylenol without relief.  She is on prescribed omeprazole.    Independent Historian:   As above    Review of External Notes:   I reviewed her office note from 10/09/2023 for a pre-op assessment.      Medications:    Norvasc  ASA 81  Tenormin  Lipitor  Zyrtec  Lisinopril  Ativan  Prilosec  Zoloft  Aldactone  Ambien    Past Medical History:    GERD  Hypertension  CAD  VISHAL  Alcohol abuse  Depression  Osteopenia  Psoriasis  Basal cell carcinoma     Past Surgical History:    Davinci rectoprexy x2  Cystoscopy  Hysterectomy  Tonsillectomy and adenoidectomy       Physical Exam   Patient Vitals for the past 24 hrs:   BP Temp Temp src Pulse Resp SpO2   11/19/23 1222 -- -- -- -- -- 95 %   11/19/23 1221 136/73 -- -- 64 -- --   11/19/23 1151 -- -- -- 65 11 94 %   11/19/23 1121 (!) 145/77 -- -- 63 26 95 %   11/19/23 1011 (!) 145/75 97.5  F (36.4  C) Oral 57 18 96 %   11/19/23 1008 -- -- -- 60 16 95 %   11/19/23 0651 (!) 183/84 (!) 96.6  F (35.9  C) -- 74 18 95 %        Physical Exam  General: Well-developed and well-nourished. Well appearing elderly   woman. Cooperative.  Head:  Atraumatic.  Eyes:  Conjunctivae, lids, and sclerae are normal.  Neck:  Supple. Normal range of motion.  CV:  Regular rate and rhythm. Normal heart sounds with no murmurs, rubs, or gallops detected.  Resp:  No respiratory distress. Clear to auscultation bilaterally without decreased breath sounds, wheezing, rales, or rhonchi.  GI:  Soft. Non-distended.  Epigastric and right upper quadrant tenderness.  Negative Diaz's.    MS:  Normal ROM. No bilateral lower extremity edema.  Skin:  Warm. Non-diaphoretic. No pallor.  Neuro:  Awake. A&Ox3. Normal strength.  Psych: Normal mood and affect. Normal speech.  Vitals reviewed.    Emergency Department Course     EKG  Indication: chest pain   Time: 0657  Rate 67 bpm. ND interval 164. QRS duration 72. QT/QTc 408/431.   Normal sinus rhythm    No acute ST changes.  No significant change as compared to prior, dated 04/21/2022.    Imaging:  US Abdomen Limited (RUQ)   Final Result   IMPRESSION:   1.  No acute findings today's right upper quadrant ultrasound.               Laboratory:  Labs Ordered and Resulted from Time of ED Arrival to Time of ED Departure   BASIC METABOLIC PANEL - Abnormal       Result Value    Sodium 135      Potassium 4.0      Chloride 99      Carbon Dioxide (CO2) 28      Anion Gap 8      Urea Nitrogen 6.9 (*)     Creatinine 0.75      GFR Estimate 83      Calcium 8.7 (*)     Glucose 124 (*)    D DIMER QUANTITATIVE - Abnormal    D-Dimer Quantitative 0.66 (*)    MAGNESIUM - Abnormal    Magnesium 1.6 (*)    TROPONIN T, HIGH SENSITIVITY - Normal    Troponin T, High Sensitivity 9     TSH WITH FREE T4 REFLEX - Normal    TSH 3.69     HEPATIC FUNCTION PANEL - Normal    Protein Total 6.4      Albumin 4.1      Bilirubin Total 0.4      Alkaline Phosphatase 52      AST 24      ALT 23      Bilirubin Direct <0.20     LIPASE - Normal    Lipase 46     CBC WITH PLATELETS AND DIFFERENTIAL    WBC Count 6.6      RBC Count 4.02      Hemoglobin 12.2       Hematocrit 36.9      MCV 92      MCH 30.3      MCHC 33.1      RDW 13.2      Platelet Count 244      % Neutrophils 73      % Lymphocytes 18      % Monocytes 7      % Eosinophils 1      % Basophils 0      % Immature Granulocytes 1      NRBCs per 100 WBC 0      Absolute Neutrophils 4.9      Absolute Lymphocytes 1.2      Absolute Monocytes 0.5      Absolute Eosinophils 0.0      Absolute Basophils 0.0      Absolute Immature Granulocytes 0.1      Absolute NRBCs 0.0        Emergency Department Course & Assessments:    Interventions:  Medications   magnesium sulfate 2 g in 50 mL sterile water intermittent infusion (2 g Intravenous Stopped 11/19/23 1116)   alum & mag hydroxide-simethicone (MAALOX) suspension 15 mL (15 mLs Oral $Given 11/19/23 1031)   famotidine (PEPCID) injection 20 mg (20 mg Intravenous $Given 11/19/23 1023)   ondansetron (ZOFRAN) injection 4 mg (4 mg Intravenous $Given 11/19/23 1025)   lidocaine (viscous) (XYLOCAINE) 2 % solution 15 mL (15 mLs Mouth/Throat $Given 11/19/23 1032)      Assessments:  0913 I examined the patient and obtained history as noted above.  1159 I rechecked and updated the patient.    Independent Interpretation (X-rays, CTs, rhythm strip):  None    Consultations/Discussion of Management or Tests:  Not applicable    Social Determinants of Health affecting care:   Supportive sister    Disposition:  The patient was discharged.     Impression & Plan      Medical Decision Making:  Liz is a 72 year old woman presenting with a burning sensation in her epigastrium over the last 10 days with associated nausea but no vomiting.  Her symptoms are worse at night and she has poor appetite.  She had COVID-19 a couple of weeks ago with subsequent negative testing and no residual fever.  She has right upper quadrant and epigastric tenderness with a negative Diaz's sign and otherwise unremarkable exam.    EKG is reassuring without acute ST changes or arrhythmias.  Troponin is normal.  I highly doubt  a cardiac etiology for this presentation.  D-dimer is negative when adjusted for age making pulmonary embolism unlikely.  I did send her for right upper quadrant ultrasound which, fortunately, is negative without cause for her pain.  She does not have kidney injury, electrolyte derangement outside of mild hypomagnesemia of 1.6 repleted with IV magnesium, hepatitis, biliary obstruction, or pancreatitis.  TSH is normal.  There is no leukocytosis or anemia.    She was given a GI cocktail as well as Zofran and Pepcid and on repeat evaluation is comfortable with plan for discharge.  I strongly suspect her symptoms are related to gastritis which may have been exacerbated by her recent COVID-19.  She is already on omeprazole which she will continue.  We discussed dietary and lifestyle modifications and the importance of following up with her primary care provider.  She understands if she is not improving she may need endoscopy.  I did provide her Zofran as needed for nausea and vomiting and we discussed indications for return.  All questions answered.    Diagnosis:    ICD-10-CM    1. Epigastric pain  R10.13       2. Nausea without vomiting  R11.0       3. Hypomagnesemia  E83.42            Discharge Medications:  Discharge Medication List as of 11/19/2023 12:09 PM        START taking these medications    Details   ondansetron (ZOFRAN ODT) 4 MG ODT tab Take 1 tablet (4 mg) by mouth every 8 hours as needed for nausea or vomiting, Disp-10 tablet, R-0, E-Prescribe                Scribe Disclosure:  NINOSKA, Renny Gomez, am serving as a scribe at 9:27 AM on 11/19/2023 to document services personally performed by Madyson Gale MD based on my observations and the provider's statements to me.     11/19/2023   Madyson Gale MD Dixson, Kylie S, MD  11/30/23 [id number]

## 2023-11-19 NOTE — DISCHARGE INSTRUCTIONS
Continue Prilosec.  Howell diet.  No caffeine, cigarettes, alcohol, NSAIDs, or any other foods that you find irritating to your stomach.  This may improve with time and could be related to recent COVID-19.  However, if you are not improving you may need another endoscopy.  Please follow-up with your primary care provider.    Zofran as needed for nausea and vomiting.  Increase dietary magnesium.  Return immediately with black or bloody stools, worsening or change in your pain, or new concerns.

## 2023-11-20 ENCOUNTER — PATIENT OUTREACH (OUTPATIENT)
Dept: INTERNAL MEDICINE | Facility: CLINIC | Age: 73
End: 2023-11-20
Payer: COMMERCIAL

## 2023-11-20 ENCOUNTER — DOCUMENTATION ONLY (OUTPATIENT)
Dept: OTHER | Facility: CLINIC | Age: 73
End: 2023-11-20
Payer: COMMERCIAL

## 2023-11-20 LAB
ATRIAL RATE - MUSE: 67 BPM
DIASTOLIC BLOOD PRESSURE - MUSE: NORMAL MMHG
INTERPRETATION ECG - MUSE: NORMAL
P AXIS - MUSE: 57 DEGREES
PR INTERVAL - MUSE: 164 MS
QRS DURATION - MUSE: 72 MS
QT - MUSE: 408 MS
QTC - MUSE: 431 MS
R AXIS - MUSE: 6 DEGREES
SYSTOLIC BLOOD PRESSURE - MUSE: NORMAL MMHG
T AXIS - MUSE: 45 DEGREES
VENTRICULAR RATE- MUSE: 67 BPM

## 2023-11-20 NOTE — TELEPHONE ENCOUNTER
What type of discharge? Emergency Department  Risk of Hospital admission or ED visit: 62.1%  Is a TCM episode required? No  When should the patient follow up with PCP? MARTIN Luna RN  [facility]

## 2023-11-29 ENCOUNTER — OFFICE VISIT (OUTPATIENT)
Dept: URGENT CARE | Facility: URGENT CARE | Age: 73
End: 2023-11-29
Payer: COMMERCIAL

## 2023-11-29 VITALS
HEART RATE: 88 BPM | OXYGEN SATURATION: 96 % | DIASTOLIC BLOOD PRESSURE: 79 MMHG | WEIGHT: 142 LBS | BODY MASS INDEX: 26.83 KG/M2 | SYSTOLIC BLOOD PRESSURE: 129 MMHG | TEMPERATURE: 99 F

## 2023-11-29 DIAGNOSIS — M62.838 NECK MUSCLE SPASM: Primary | ICD-10-CM

## 2023-11-29 PROCEDURE — 99214 OFFICE O/P EST MOD 30 MIN: CPT | Performed by: PHYSICIAN ASSISTANT

## 2023-11-29 RX ORDER — CYCLOBENZAPRINE HCL 5 MG
5 TABLET ORAL 3 TIMES DAILY PRN
Qty: 30 TABLET | Refills: 0 | Status: SHIPPED | OUTPATIENT
Start: 2023-11-29 | End: 2024-04-18

## 2023-11-30 NOTE — PROGRESS NOTES
SUBJECTIVE:  Chief Complaint   Patient presents with    Urgent Care     Pt reports neck pain X 3 days.     Liz Haider is a 72 year old female presents with a chief complaint of bilateral neck pain and tenderness.  The pain began 3 day(s) ago. Woke with the discomfort.  No fever, neuro symptoms.  No chest pain, shortness of breath.    Past Medical History:   Diagnosis Date    Acid reflux disease     Benign essential hypertension     CAD (coronary artery disease) 2006    [facility]; acute NSTEMI, severe single vessel disease in RCA, PTCA/UEGENIO    VISHAL (generalized anxiety disorder)     History of alcohol abuse     sober since 1987    History of basal cell carcinoma     multiple around right eye    Impaired fasting glucose     MDD (major depressive disorder)     Microscopic hematuria     multiple, negative work-ups    Osteopenia     Psoriasis      Current Outpatient Medications   Medication Sig Dispense Refill    amLODIPine (NORVASC) 10 MG tablet Take 1/2 (one-half) tablet by mouth once daily 45 tablet 2    aspirin (ASA) 81 MG tablet Take 1 tablet (81 mg) by mouth daily      atenolol (TENORMIN) 100 MG tablet Take 1 tablet by mouth once daily 90 tablet 2    atorvastatin (LIPITOR) 40 MG tablet Take 1 tablet (40 mg) by mouth daily 90 tablet 3    bimatoprost (LATISSE) 0.03 % external opthalmic solution Apply 1 drop topically At Bedtime 5 mL 11    Biotin 1000 MCG CHEW Take 1,000 mcg by mouth daily      Calcium Carbonate-Vit D-Min (CALCIUM 1200) 1978-9860 MG-UNIT CHEW Take 1 chew tab by mouth daily      cetirizine (ZYRTEC) 10 MG tablet Take 1 tablet (10 mg) by mouth daily 30 tablet 0    cyclobenzaprine (FLEXERIL) 5 MG tablet Take 1 tablet (5 mg) by mouth 3 times daily as needed for muscle spasms 30 tablet 0    fluticasone (FLONASE) 50 MCG/ACT nasal spray Spray 1 spray into both nostrils daily 9.9 mL 0    lisinopril (ZESTRIL) 20 MG tablet Take 1 tablet by mouth twice daily 180 tablet 2    LORazepam (ATIVAN) 0.5 MG tablet TAKE  1 TABLET BY MOUTH ONCE DAILY AS NEEDED FOR ANXIETY 15 tablet 0    omeprazole (PRILOSEC) 20 MG DR capsule Take 1 capsule by mouth once daily 90 capsule 2    ondansetron (ZOFRAN ODT) 4 MG ODT tab Take 1 tablet (4 mg) by mouth every 8 hours as needed for nausea or vomiting 10 tablet 0    sertraline (ZOLOFT) 100 MG tablet Take 1 tablet by mouth once daily 90 tablet 0    spironolactone (ALDACTONE) 25 MG tablet Take 1 tablet by mouth once daily 90 tablet 2    vitamin D3 (CHOLECALCIFEROL) 1000 units (25 mcg) tablet Take 1 tablet (1,000 Units) by mouth daily      zolpidem (AMBIEN) 5 MG tablet TAKE 1 TABLET BY MOUTH AT BEDTIME 90 tablet 0     Social History     Tobacco Use    Smoking status: Former     Packs/day: 1.00     Years: 10.00     Additional pack years: 0.00     Total pack years: 10.00     Types: Cigarettes     Quit date: 2006     Years since quittin.4    Smokeless tobacco: Never   Substance Use Topics    Alcohol use: No     Comment: history of alcohol abuse; sober since        ROS:  Review of systems negative except as stated above.    EXAM:   /79   Pulse 88   Temp 99  F (37.2  C) (Tympanic)   Wt 64.4 kg (142 lb)   LMP  (LMP Unknown)   SpO2 96%   BMI 26.83 kg/m    Gen: healthy,alert,no distress  Extremity: neck has point tenderness bilateral paraspinals, FROM, and pain with side flexion.   CHEST: clear to auscultation  CV: regular rate and rhythm  MS: no gross deformities noted, no evidence of inflammation in joints, FROM in all extremities.  SKIN: no suspicious lesions or rashes  NEURO: Normal strength and tone, sensory exam grossly normal, mentation intact and speech normal    No results found for any visits on 23.    ASSESSMENT:   (M62.838) Neck muscle spasm  (primary encounter diagnosis)  Comment: no red flags  Plan: cyclobenzaprine (FLEXERIL) 5 MG tablet      Red flags and emergent follow up discussed, and understood by patient  Follow up with PCP if symptoms worsen or fail to  improve

## 2023-12-29 DIAGNOSIS — G47.9 TROUBLE IN SLEEPING: ICD-10-CM

## 2023-12-29 RX ORDER — ZOLPIDEM TARTRATE 5 MG/1
TABLET ORAL
Qty: 90 TABLET | Refills: 0 | OUTPATIENT
Start: 2023-12-29

## 2023-12-30 NOTE — TELEPHONE ENCOUNTER
Patient filled #90 pills 9/28/23 and filled another #30 11/28/23. She should have enough pills to last until the end of January. Also, she should only get Ambien from 1 provider - me or her psychiatry nurse practitioner. Her psychiatry nurse provider can continue to prescribe the lorazepam.

## 2024-01-03 NOTE — TELEPHONE ENCOUNTER
Spoke to patient to relay providers message. Patient agrees and will have psychiatry manage Ambien and Lorazepam, stating she meets with psychiatry monthly and is also attending grieving classes.

## 2024-01-28 DIAGNOSIS — I10 BENIGN ESSENTIAL HYPERTENSION: ICD-10-CM

## 2024-01-29 RX ORDER — LISINOPRIL 20 MG/1
TABLET ORAL
Qty: 180 TABLET | Refills: 0 | Status: SHIPPED | OUTPATIENT
Start: 2024-01-29 | End: 2024-04-02

## 2024-01-29 RX ORDER — SPIRONOLACTONE 25 MG/1
TABLET ORAL
Qty: 90 TABLET | Refills: 0 | Status: SHIPPED | OUTPATIENT
Start: 2024-01-29 | End: 2024-04-19

## 2024-02-16 DIAGNOSIS — I25.10 CORONARY ARTERY DISEASE INVOLVING NATIVE CORONARY ARTERY OF NATIVE HEART WITHOUT ANGINA PECTORIS: ICD-10-CM

## 2024-02-16 DIAGNOSIS — I10 BENIGN ESSENTIAL HYPERTENSION: ICD-10-CM

## 2024-02-19 RX ORDER — ATENOLOL 100 MG/1
TABLET ORAL
Qty: 90 TABLET | Refills: 0 | Status: SHIPPED | OUTPATIENT
Start: 2024-02-19 | End: 2024-05-17

## 2024-02-19 NOTE — TELEPHONE ENCOUNTER
Prescription approved per [facility] Refill Protocol.  Yissel Anthony RN  [facility]

## 2024-02-27 ENCOUNTER — OFFICE VISIT (OUTPATIENT)
Dept: DERMATOLOGY | Facility: CLINIC | Age: 74
End: 2024-02-27
Payer: COMMERCIAL

## 2024-02-27 DIAGNOSIS — D18.01 ANGIOMA OF SKIN: ICD-10-CM

## 2024-02-27 DIAGNOSIS — D22.9 NEVUS: ICD-10-CM

## 2024-02-27 DIAGNOSIS — L82.1 SEBORRHEIC KERATOSIS: ICD-10-CM

## 2024-02-27 DIAGNOSIS — Z85.828 HISTORY OF BASAL CELL CARCINOMA (BCC): ICD-10-CM

## 2024-02-27 DIAGNOSIS — L21.9 DERMATITIS, SEBORRHEIC: Primary | ICD-10-CM

## 2024-02-27 DIAGNOSIS — L65.9 HYPOTRICHOSIS: ICD-10-CM

## 2024-02-27 DIAGNOSIS — L81.4 LENTIGO: ICD-10-CM

## 2024-02-27 PROCEDURE — 99213 OFFICE O/P EST LOW 20 MIN: CPT | Performed by: PHYSICIAN ASSISTANT

## 2024-02-27 RX ORDER — HYDROCORTISONE 2.5 %
CREAM (GRAM) TOPICAL
Qty: 30 G | Refills: 3 | Status: SHIPPED | OUTPATIENT
Start: 2024-02-27

## 2024-02-27 RX ORDER — BIMATOPROST 3 UG/ML
1 SOLUTION TOPICAL AT BEDTIME
Qty: 5 ML | Refills: 11 | Status: SHIPPED | OUTPATIENT
Start: 2024-02-27

## 2024-02-27 NOTE — LETTER
2/27/2024         RE: Liz Haider  [address]  [facility] [address]        Dear Colleague,    Thank you for referring your patient, Liz Haider, to the [facility]. Please see a copy of my visit note below.    HPI:   Chief complaints: Liz Haider is a pleasant 72 year old female who presents for Full skin cancer screening to rule out skin cancer   Last Skin Exam: 1 year ago      1st Baseline: no  Personal HX of Skin Cancer: yes BCC   Personal HX of Malignant Melanoma: no   Family HX of Skin Cancer / Malignant Melanoma: no  Personal HX of Atypical Moles:   no  Risk factors: history of sun exposure and burns  New / Changing lesions: none  Social History: Caring for brother who is not doing well. Sister just passed away from dementia and she cared for her as well.   On review of systems, there are no further skin complaints, patient is feeling otherwise well.   ROS of the following were done and are negative: Constitutional, Eyes, Ears, Nose,   Mouth, Throat, Cardiovascular, Respiratory, GI, Genitourinary, Musculoskeletal,   Psychiatric, Endocrine, Allergic/Immunologic.    PHYSICAL EXAM:   LMP  (LMP Unknown)   Skin exam performed as follows: Type 2 skin. Mood appropriate  Alert and Oriented X 3. Well developed, well nourished in no distress.  General appearance: Normal  Head including face: Normal  Eyes: conjunctiva and lids: Normal  Mouth: Lips, teeth, gums: Normal  Neck: Normal  Chest-breast/axillae: Normal  Back: Normal  Spleen and liver: Normal  Cardiovascular: Exam of peripheral vascular system by observation for swelling, varicosities, edema: Normal  Genitalia: groin, buttocks: Normal  Extremities: digits/nails (clubbing): Normal  Eccrine and Apocrine glands: Normal  Right upper extremity: Normal  Left upper extremity: Normal  Right lower extremity: Normal  Left lower extremity: Normal  Skin: Scalp and body hair: See below    Pt deferred exam of breasts, groin,  buttocks: No    Other physical findings:  1. Multiple pigmented macules on extremities and trunk  2. Multiple pigmented macules on face, trunk and extremities  3. Multiple vascular papules on trunk, arms and legs  4. Multiple scattered keratotic plaques  5. Flaking and erythema along glabella       Except as noted above, no other signs of skin cancer or melanoma.     ASSESSMENT/PLAN:   Benign Full skin cancer screening today. . Patient with history of BCC  Advised on monthly self exams and 1 year  Patient Education: Appropriate brochures given.    Multiple benign appearing melanocytic nevi on arms, legs and trunk. Discussed ABCDEs of melanoma and sunscreen.   Multiple lentigos on arms, legs and trunk. Advised benign, no treatment needed.  Multiple scattered angiomas. Advised benign, no treatment needed.   Seborrheic keratosis on arms, legs and trunk. Advised benign, no treatment needed.  Seborrheic dermatitis on the face  --Start HC 2.5% cream BID PRN  Hypotrichosis   --Continue Latisse daily            Follow-up: yearly/PRN sooner    1.) Patient was asked about new and changing moles. YES  2.) Patient received a complete physical skin examination: YES  3.) Patient was counseled to perform a monthly self skin examination: YES  Scribed By: Reanna Duarte, MS, PAJOYCE        Again, thank you for allowing me to participate in the care of your patient.        Sincerely,        Reanna Duarte PA-C

## 2024-02-27 NOTE — PROGRESS NOTES
HPI:   Chief complaints: Liz Haider is a pleasant 72 year old female who presents for Full skin cancer screening to rule out skin cancer   Last Skin Exam: 1 year ago      1st Baseline: no  Personal HX of Skin Cancer: yes BCC   Personal HX of Malignant Melanoma: no   Family HX of Skin Cancer / Malignant Melanoma: no  Personal HX of Atypical Moles:   no  Risk factors: history of sun exposure and burns  New / Changing lesions: none  Social History: Caring for brother who is not doing well. Sister just passed away from dementia and she cared for her as well.   On review of systems, there are no further skin complaints, patient is feeling otherwise well.   ROS of the following were done and are negative: Constitutional, Eyes, Ears, Nose,   Mouth, Throat, Cardiovascular, Respiratory, GI, Genitourinary, Musculoskeletal,   Psychiatric, Endocrine, Allergic/Immunologic.    PHYSICAL EXAM:   LMP  (LMP Unknown)   Skin exam performed as follows: Type 2 skin. Mood appropriate  Alert and Oriented X 3. Well developed, well nourished in no distress.  General appearance: Normal  Head including face: Normal  Eyes: conjunctiva and lids: Normal  Mouth: Lips, teeth, gums: Normal  Neck: Normal  Chest-breast/axillae: Normal  Back: Normal  Spleen and liver: Normal  Cardiovascular: Exam of peripheral vascular system by observation for swelling, varicosities, edema: Normal  Genitalia: groin, buttocks: Normal  Extremities: digits/nails (clubbing): Normal  Eccrine and Apocrine glands: Normal  Right upper extremity: Normal  Left upper extremity: Normal  Right lower extremity: Normal  Left lower extremity: Normal  Skin: Scalp and body hair: See below    Pt deferred exam of breasts, groin, buttocks: No    Other physical findings:  1. Multiple pigmented macules on extremities and trunk  2. Multiple pigmented macules on face, trunk and extremities  3. Multiple vascular papules on trunk, arms and legs  4. Multiple scattered keratotic plaques  5.  Flaking and erythema along glabella       Except as noted above, no other signs of skin cancer or melanoma.     ASSESSMENT/PLAN:   Benign Full skin cancer screening today. . Patient with history of BCC  Advised on monthly self exams and 1 year  Patient Education: Appropriate brochures given.    Multiple benign appearing melanocytic nevi on arms, legs and trunk. Discussed ABCDEs of melanoma and sunscreen.   Multiple lentigos on arms, legs and trunk. Advised benign, no treatment needed.  Multiple scattered angiomas. Advised benign, no treatment needed.   Seborrheic keratosis on arms, legs and trunk. Advised benign, no treatment needed.  Seborrheic dermatitis on the face  --Start HC 2.5% cream BID PRN  Hypotrichosis   --Continue Latisse daily            Follow-up: yearly/PRN sooner    1.) Patient was asked about new and changing moles. YES  2.) Patient received a complete physical skin examination: YES  3.) Patient was counseled to perform a monthly self skin examination: YES  Scribed By: Reanna Duarte MS, PAJOYCE

## 2024-03-29 DIAGNOSIS — I10 BENIGN ESSENTIAL HYPERTENSION: ICD-10-CM

## 2024-04-02 RX ORDER — LISINOPRIL 20 MG/1
TABLET ORAL
Qty: 180 TABLET | Refills: 1 | Status: SHIPPED | OUTPATIENT
Start: 2024-04-02

## 2024-04-02 RX ORDER — AMLODIPINE BESYLATE 10 MG/1
TABLET ORAL
Qty: 45 TABLET | Refills: 1 | Status: SHIPPED | OUTPATIENT
Start: 2024-04-02 | End: 2024-09-23

## 2024-04-16 SDOH — HEALTH STABILITY: PHYSICAL HEALTH: ON AVERAGE, HOW MANY DAYS PER WEEK DO YOU ENGAGE IN MODERATE TO STRENUOUS EXERCISE (LIKE A BRISK WALK)?: 6 DAYS

## 2024-04-16 SDOH — HEALTH STABILITY: PHYSICAL HEALTH: ON AVERAGE, HOW MANY MINUTES DO YOU ENGAGE IN EXERCISE AT THIS LEVEL?: 20 MIN

## 2024-04-16 ASSESSMENT — SOCIAL DETERMINANTS OF HEALTH (SDOH): HOW OFTEN DO YOU GET TOGETHER WITH FRIENDS OR RELATIVES?: TWICE A WEEK

## 2024-04-19 ENCOUNTER — OFFICE VISIT (OUTPATIENT)
Dept: INTERNAL MEDICINE | Facility: CLINIC | Age: 74
End: 2024-04-19
Attending: INTERNAL MEDICINE
Payer: COMMERCIAL

## 2024-04-19 VITALS
HEART RATE: 63 BPM | BODY MASS INDEX: 25.26 KG/M2 | TEMPERATURE: 98.1 F | WEIGHT: 133.8 LBS | HEIGHT: 61 IN | RESPIRATION RATE: 14 BRPM | SYSTOLIC BLOOD PRESSURE: 124 MMHG | DIASTOLIC BLOOD PRESSURE: 76 MMHG | OXYGEN SATURATION: 100 %

## 2024-04-19 DIAGNOSIS — F41.1 GAD (GENERALIZED ANXIETY DISORDER): ICD-10-CM

## 2024-04-19 DIAGNOSIS — F33.42 RECURRENT MAJOR DEPRESSIVE DISORDER, IN FULL REMISSION (H): ICD-10-CM

## 2024-04-19 DIAGNOSIS — I25.10 CORONARY ARTERY DISEASE INVOLVING NATIVE CORONARY ARTERY OF NATIVE HEART WITHOUT ANGINA PECTORIS: ICD-10-CM

## 2024-04-19 DIAGNOSIS — Z13.1 SCREENING FOR DIABETES MELLITUS: ICD-10-CM

## 2024-04-19 DIAGNOSIS — E55.9 VITAMIN D DEFICIENCY: ICD-10-CM

## 2024-04-19 DIAGNOSIS — Z13.29 SCREENING FOR THYROID DISORDER: ICD-10-CM

## 2024-04-19 DIAGNOSIS — R73.01 IMPAIRED FASTING GLUCOSE: ICD-10-CM

## 2024-04-19 DIAGNOSIS — I10 BENIGN ESSENTIAL HYPERTENSION: ICD-10-CM

## 2024-04-19 DIAGNOSIS — Z00.00 ROUTINE HISTORY AND PHYSICAL EXAMINATION OF ADULT: Primary | ICD-10-CM

## 2024-04-19 LAB
HBA1C MFR BLD: 5.5 % (ref 0–5.6)
HOLD SPECIMEN: NORMAL

## 2024-04-19 PROCEDURE — 82306 VITAMIN D 25 HYDROXY: CPT | Performed by: INTERNAL MEDICINE

## 2024-04-19 PROCEDURE — 83036 HEMOGLOBIN GLYCOSYLATED A1C: CPT | Performed by: INTERNAL MEDICINE

## 2024-04-19 PROCEDURE — G0439 PPPS, SUBSEQ VISIT: HCPCS | Performed by: INTERNAL MEDICINE

## 2024-04-19 PROCEDURE — 84443 ASSAY THYROID STIM HORMONE: CPT | Mod: GZ | Performed by: INTERNAL MEDICINE

## 2024-04-19 PROCEDURE — 80061 LIPID PANEL: CPT | Performed by: INTERNAL MEDICINE

## 2024-04-19 PROCEDURE — 36415 COLL VENOUS BLD VENIPUNCTURE: CPT | Performed by: INTERNAL MEDICINE

## 2024-04-19 PROCEDURE — 80053 COMPREHEN METABOLIC PANEL: CPT | Performed by: INTERNAL MEDICINE

## 2024-04-19 RX ORDER — ATORVASTATIN CALCIUM 40 MG/1
40 TABLET, FILM COATED ORAL DAILY
Qty: 90 TABLET | Refills: 0 | Status: SHIPPED | OUTPATIENT
Start: 2024-04-19 | End: 2024-07-15

## 2024-04-19 RX ORDER — ZOLPIDEM TARTRATE 6.25 MG/1
TABLET, FILM COATED, EXTENDED RELEASE ORAL
COMMUNITY
Start: 2024-04-17

## 2024-04-19 RX ORDER — SPIRONOLACTONE 25 MG/1
TABLET ORAL
Qty: 90 TABLET | Refills: 0 | Status: SHIPPED | OUTPATIENT
Start: 2024-04-19 | End: 2024-07-26

## 2024-04-19 SDOH — HEALTH STABILITY: PHYSICAL HEALTH: ON AVERAGE, HOW MANY DAYS PER WEEK DO YOU ENGAGE IN MODERATE TO STRENUOUS EXERCISE (LIKE A BRISK WALK)?: 6 DAYS

## 2024-04-19 SDOH — HEALTH STABILITY: PHYSICAL HEALTH: ON AVERAGE, HOW MANY MINUTES DO YOU ENGAGE IN EXERCISE AT THIS LEVEL?: 20 MIN

## 2024-04-19 ASSESSMENT — PATIENT HEALTH QUESTIONNAIRE - PHQ9
10. IF YOU CHECKED OFF ANY PROBLEMS, HOW DIFFICULT HAVE THESE PROBLEMS MADE IT FOR YOU TO DO YOUR WORK, TAKE CARE OF THINGS AT HOME, OR GET ALONG WITH OTHER PEOPLE: SOMEWHAT DIFFICULT
SUM OF ALL RESPONSES TO PHQ QUESTIONS 1-9: 7
SUM OF ALL RESPONSES TO PHQ QUESTIONS 1-9: 7

## 2024-04-19 ASSESSMENT — SOCIAL DETERMINANTS OF HEALTH (SDOH): HOW OFTEN DO YOU GET TOGETHER WITH FRIENDS OR RELATIVES?: THREE TIMES A WEEK

## 2024-04-19 NOTE — PROGRESS NOTES
ASSESSMENT/PLAN                                                       (Z00.00) Routine history and physical examination of adult  (primary encounter diagnosis)  Comment: PMH, PSH, FH, SH, medications, allergies, immunizations, and preventative health measures reviewed and updated as appropriate.  Plan: see below for plans.      (Z13.1) Screening for diabetes mellitus  (E55.9) Vitamin D deficiency  (R73.01) Impaired fasting glucose  (I25.10) Coronary artery disease involving native coronary artery of native heart without angina pectoris  Plan: fasting labs today.    (F33.42) Recurrent major depressive disorder, in full remission (H24)  (F41.1) VISHAL (generalized anxiety disorder)  Comment: well-controlled without medication.    Sydnee Miller MD   [address] [address]  T: [phone], F: [phone]    SUBJECTIVE                                                      Liz Haider is a very pleasant 73 year old female who presents for a physical.    ROS:  Constitutional: no unintentional weight loss or gain reported; no fevers, chills, or sweats reported  Cardiovascular: no chest pain, palpitations, or edema reported  Respiratory: no cough, wheezing, shortness of breath, or dyspnea on exertion reported  Gastrointestinal: no nausea, vomiting, constipation, diarrhea, or abdominal pain reported  Genitourinary: no urinary frequency, urgency, dysuria, or hematuria reported  Integumentary: no rash or pruritus reported  Musculoskeletal: no back pain, muscle pain, joint pain, or joint swelling reported  Neurologic: no focal weakness, numbness, or tingling reported  Hematologic: no easy bruising or bleeding reported  Endocrine: no heat or cold intolerance reported; no polyuria or polydipsia reported  Psychiatric: see PMH below    Past Medical History:   Diagnosis Date    Acid reflux disease     Benign essential hypertension     CAD (coronary artery disease) 2006    [facility];  acute NSTEMI, severe single vessel disease in RCA, PTCA/EUGENIO    VISHAL (generalized anxiety disorder)     History of alcohol abuse     sober since 1987    History of basal cell carcinoma     multiple around right eye    Impaired fasting glucose     MDD (major depressive disorder)     Microscopic hematuria     multiple, negative work-ups    Osteopenia     Psoriasis      Past Surgical History:   Procedure Laterality Date    CATARACT EXTRACTION, BILATERAL  2023    COLONOSCOPY  04/20/2012    Procedure:COLONOSCOPY; COLONOSCOPY; Surgeon:EDSON SHELLEY; Location: GI    DAVINCI RECTOPEXY N/A 10/31/2018    Procedure: ROBOTIC ASSISTED XI VENTRAL RECTOPEXY (DENIS) ;  Surgeon: Kirsten Huynh MD;  Location:  OR    DAVINCI RECTOPEXY  2022    for rectal prolapse    DAVINCI SACROCOLPOPEXY, CYSTOSCOPY, COMBINED N/A 10/31/2018    Procedure: ROBOTIC ASSISTED XI SACROCOLPOPEXY, CYSTOSCOPY (CEFERINO), BILATERAL SALPINGO-OORPHORECTOMY, MID URETHRAL SLING;  Surgeon: Turner Jonas MD;  Location:  OR    DAVINCI SACROCOLPOPEXY, MIDURETHRAL SLING, CYSTOSCOPY  10/31/2018    Procedure: DAVINCI SACROCOLPOPEXY, MIDURETHRAL SLING, CYSTOSCOPY;  Surgeon: Turner Jonas MD;  Location: [facility]    DAVINCI SALPINGO-OOPHORECTOMY INCLUDING BILATERAL Bilateral 10/31/2018    Procedure: DAVINCI SALPINGO-OOPHORECTOMY INCLUDING BILATERAL;  Surgeon: Turner Jonas MD;  Location:  OR    HYSTERECTOMY TOTAL ABDOMINAL  1988    for heavy periods    TONSILLECTOMY & ADENOIDECTOMY  1952     Family History   Problem Relation Age of Onset    Dementia Mother     Depression Mother     Anxiety Disorder Mother     Myocardial Infarction Father         later in life    Diabetes Type 2  Sister     Bladder Cancer Sister         smoking history    Frontotemporal dementia Sister     Cirrhosis Sister         non-alcoholic    Hypertension Sister     Tongue cancer Brother     Diabetes Type 2  Brother     Cerebrovascular Disease No family hx of      Coronary Artery Disease Early Onset No family hx of     Breast Cancer No family hx of     Ovarian Cancer No family hx of     Colon Cancer No family hx of      Social History     Occupational History    Occupation: Retired - [place]//   Tobacco Use    Smoking status: Former     Types: Cigarettes    Smokeless tobacco: Never    Tobacco comments:     Quit in 2006; smoked for 10 years; at most 1ppd.   Vaping Use    Vaping status: Never Used   Substance and Sexual Activity    Alcohol use: No     Comment: history of alcohol abuse; sober since 1987    Drug use: No    Sexual activity: Not Currently   Social History Narrative     (as of 2020).     No kids.    No formal exercise.      Allergies   Allergen Reactions    Penicillins Hives    Sulfa Antibiotics Hives     Current Outpatient Medications   Medication Sig    amLODIPine (NORVASC) 10 MG tablet Take 1/2 (one-half) tablet by mouth once daily    aspirin (ASA) 81 MG tablet Take 1 tablet (81 mg) by mouth daily    atenolol (TENORMIN) 100 MG tablet Take 1 tablet by mouth once daily    atorvastatin (LIPITOR) 40 MG tablet Take 1 tablet (40 mg) by mouth daily    bimatoprost (LATISSE) 0.03 % external opthalmic solution Apply 1 drop topically at bedtime    Biotin 1000 MCG CHEW Take 1,000 mcg by mouth daily    Calcium Carbonate-Vit D-Min (CALCIUM 1200) 2328-0506 MG-UNIT CHEW Take 1 chew tab by mouth daily    hydrocortisone 2.5 % cream Apply to face BID x 7-10 days then PRN only    lisinopril (ZESTRIL) 20 MG tablet Take 1 tablet by mouth twice daily    LORazepam (ATIVAN) 0.5 MG tablet TAKE 1 TABLET BY MOUTH ONCE DAILY AS NEEDED FOR ANXIETY    omeprazole (PRILOSEC) 20 MG DR capsule Take 1 capsule by mouth once daily    spironolactone (ALDACTONE) 25 MG tablet Take 1 tablet by mouth once daily    vitamin D3 (CHOLECALCIFEROL) 1000 units (25 mcg) tablet Take 1 tablet (1,000 Units) by mouth daily    zolpidem ER (AMBIEN CR) 6.25 MG CR tablet      Immunization  History   Administered Date(s) Administered    COVID-19 12+ (2023-24) (Pfizer) 12/11/2023    COVID-19 Bivalent 12+ (Pfizer) 05/02/2023    COVID-19 Bivalent 18+ (Moderna) 09/09/2022    COVID-19 MONOVALENT 12+ (Pfizer) 03/04/2021, 03/25/2021, 09/28/2021    COVID-19 Monovalent 12+ (Pfizer 2022) 04/11/2022    COVID-19 Monovalent Booster 18+ (Moderna) 09/09/2022    Flu 65+ Years 09/11/2014, 09/21/2015, 09/07/2016, 08/29/2017, 09/13/2018, 09/09/2019    Flu, Unspecified 10/29/1997, 11/04/2007, 10/05/2008, 10/01/2012, 09/19/2013    Influenza (H1N1) 01/05/2010    Influenza (High Dose) 3 valent vaccine 09/28/2010, 09/11/2014, 09/21/2015, 09/07/2016, 08/29/2017, 09/13/2018, 09/09/2019    Influenza (IIV3) PF 10/29/1997, 11/04/2007, 10/05/2008, 10/01/2012, 09/19/2013    Influenza Vaccine 65+ (FLUAD) 09/18/2021, 09/09/2022    Influenza Vaccine 65+ (Fluzone HD) 09/09/2022, 10/09/2023    Influenza Vaccine >6 months,quad, PF 09/04/2020    Influenza Vaccine, 6+MO IM (QUADRIVALENT W/PRESERVATIVES) 09/18/2021    Nasal Influenza Vaccine 2-49 (FluMist) 09/04/2020    Pneumo Conj 13-V (2010&after) 04/14/2015    Pneumococcal 20 valent Conjugate (Prevnar 20) 04/20/2023    Pneumococcal 23 valent 12/04/2007, 04/17/2018    RSV Vaccine (Arexvy) 12/11/2023    TD,PF 7+ (Tenivac) 02/08/2006    TDAP Vaccine (Adacel) 02/09/2016    Td (Adult), Adsorbed 02/08/2006    Zoster recombinant adjuvanted (SHINGRIX) 05/21/2018, 09/13/2018    Zoster vaccine, live 12/31/2008     PREVENTATIVE HEALTH                                                      BMI: within normal limits for age  Blood pressure: well-controlled on current regimen   Breast CA screening: up to date   Cervical CA screening: n/a   Colon CA screening: up to date   Lung CA screening: patient does not meet screening criteria  Dexa: up to date   Screening cholesterol: n/a - already being treated for this condition  Screening diabetes: DUE  STD testing: not sexually active  Alcohol misuse screening:  "alcohol use reviewed - no intervention indicated at this time  Immunizations: reviewed; up to date     OBJECTIVE                                                      /76   Pulse 63   Temp 98.1  F (36.7  C) (Tympanic)   Resp 14   Ht 1.549 m (5' 1\")   Wt 60.7 kg (133 lb 12.8 oz)   LMP  (LMP Unknown)   SpO2 100%   BMI 25.28 kg/m    Constitutional: well-appearing  Head, Ears, and Eyes: normocephalic; normal external auditory canal and pinna; tympanic membranes visualized and normal; normal lids and conjunctivae  Neck: supple, symmetric, no thyromegaly or lymphadenopathy  Respiratory: normal respiratory effort; clear to auscultation bilaterally  Cardiovascular: regular rate and rhythm; no edema  Gastrointestinal: soft, non-tender, and non-distended; no organomegaly or masses  Musculoskeletal: normal gait and station  Psych: normal judgment and insight; normal mood and affect; recent and remote memory intact    ---  (Note was completed, in part, with [employer] voice-recognition software. Documentation was reviewed, but some grammatical, spelling, and word errors may remain.)    "

## 2024-04-19 NOTE — ADDENDUM NOTE
Addended by: AWILDA OLMOS on: 4/19/2024 09:58 AM     Modules accepted: Orders    
Addended by: TEVIN WINTERS on: 4/19/2024 10:10 AM     Modules accepted: Orders    
Yes - the patient is able to be screened

## 2024-04-20 LAB
ALBUMIN SERPL BCG-MCNC: 4.4 G/DL (ref 3.5–5.2)
ALP SERPL-CCNC: 57 U/L (ref 40–150)
ALT SERPL W P-5'-P-CCNC: 27 U/L (ref 0–50)
ANION GAP SERPL CALCULATED.3IONS-SCNC: 12 MMOL/L (ref 7–15)
AST SERPL W P-5'-P-CCNC: 27 U/L (ref 0–45)
BILIRUB SERPL-MCNC: 0.6 MG/DL
BUN SERPL-MCNC: 19.5 MG/DL (ref 8–23)
CALCIUM SERPL-MCNC: 9.7 MG/DL (ref 8.8–10.2)
CHLORIDE SERPL-SCNC: 96 MMOL/L (ref 98–107)
CHOLEST SERPL-MCNC: 173 MG/DL
CREAT SERPL-MCNC: 1.06 MG/DL (ref 0.51–0.95)
DEPRECATED HCO3 PLAS-SCNC: 23 MMOL/L (ref 22–29)
EGFRCR SERPLBLD CKD-EPI 2021: 55 ML/MIN/1.73M2
FASTING STATUS PATIENT QL REPORTED: YES
GLUCOSE SERPL-MCNC: 102 MG/DL (ref 70–99)
HDLC SERPL-MCNC: 75 MG/DL
LDLC SERPL CALC-MCNC: 87 MG/DL
NONHDLC SERPL-MCNC: 98 MG/DL
POTASSIUM SERPL-SCNC: 5.2 MMOL/L (ref 3.4–5.3)
PROT SERPL-MCNC: 6.7 G/DL (ref 6.4–8.3)
SODIUM SERPL-SCNC: 131 MMOL/L (ref 135–145)
TRIGL SERPL-MCNC: 54 MG/DL
TSH SERPL DL<=0.005 MIU/L-ACNC: 2.82 UIU/ML (ref 0.3–4.2)
VIT D+METAB SERPL-MCNC: 49 NG/ML (ref 20–50)

## 2024-05-17 DIAGNOSIS — I10 BENIGN ESSENTIAL HYPERTENSION: ICD-10-CM

## 2024-05-17 DIAGNOSIS — K21.9 GASTROESOPHAGEAL REFLUX DISEASE: ICD-10-CM

## 2024-05-17 DIAGNOSIS — I25.10 CORONARY ARTERY DISEASE INVOLVING NATIVE CORONARY ARTERY OF NATIVE HEART WITHOUT ANGINA PECTORIS: ICD-10-CM

## 2024-05-17 RX ORDER — ATENOLOL 100 MG/1
TABLET ORAL
Qty: 90 TABLET | Refills: 2 | Status: SHIPPED | OUTPATIENT
Start: 2024-05-17

## 2024-06-18 ENCOUNTER — ANCILLARY PROCEDURE (OUTPATIENT)
Dept: MAMMOGRAPHY | Facility: CLINIC | Age: 74
End: 2024-06-18
Attending: INTERNAL MEDICINE
Payer: COMMERCIAL

## 2024-06-18 DIAGNOSIS — Z12.31 VISIT FOR SCREENING MAMMOGRAM: ICD-10-CM

## 2024-06-18 PROCEDURE — 77063 BREAST TOMOSYNTHESIS BI: CPT | Mod: TC | Performed by: STUDENT IN AN ORGANIZED HEALTH CARE EDUCATION/TRAINING PROGRAM

## 2024-06-18 PROCEDURE — 77067 SCR MAMMO BI INCL CAD: CPT | Mod: TC | Performed by: STUDENT IN AN ORGANIZED HEALTH CARE EDUCATION/TRAINING PROGRAM

## 2024-07-14 DIAGNOSIS — I25.10 CORONARY ARTERY DISEASE INVOLVING NATIVE CORONARY ARTERY OF NATIVE HEART WITHOUT ANGINA PECTORIS: ICD-10-CM

## 2024-07-15 RX ORDER — ATORVASTATIN CALCIUM 40 MG/1
40 TABLET, FILM COATED ORAL DAILY
Qty: 90 TABLET | Refills: 0 | Status: SHIPPED | OUTPATIENT
Start: 2024-07-15

## 2024-07-26 DIAGNOSIS — I10 BENIGN ESSENTIAL HYPERTENSION: ICD-10-CM

## 2024-07-26 RX ORDER — SPIRONOLACTONE 25 MG/1
TABLET ORAL
Qty: 90 TABLET | Refills: 0 | Status: SHIPPED | OUTPATIENT
Start: 2024-07-26

## 2024-09-23 DIAGNOSIS — I10 BENIGN ESSENTIAL HYPERTENSION: ICD-10-CM

## 2024-09-23 RX ORDER — AMLODIPINE BESYLATE 10 MG/1
TABLET ORAL
Qty: 45 TABLET | Refills: 0 | Status: SHIPPED | OUTPATIENT
Start: 2024-09-23

## 2024-10-08 DIAGNOSIS — I25.10 CORONARY ARTERY DISEASE INVOLVING NATIVE CORONARY ARTERY OF NATIVE HEART WITHOUT ANGINA PECTORIS: ICD-10-CM

## 2024-10-08 RX ORDER — ATORVASTATIN CALCIUM 40 MG/1
40 TABLET, FILM COATED ORAL DAILY
Qty: 90 TABLET | Refills: 1 | Status: SHIPPED | OUTPATIENT
Start: 2024-10-08

## 2024-10-08 NOTE — TELEPHONE ENCOUNTER
LDL Cholesterol Calculated   Date Value Ref Range Status   04/19/2024 87 <=100 mg/dL Final   09/10/2020 85 <100 mg/dL Final     Comment:     Desirable:       <100 mg/dl

## 2024-10-19 DIAGNOSIS — I10 BENIGN ESSENTIAL HYPERTENSION: ICD-10-CM

## 2024-10-21 RX ORDER — LISINOPRIL 20 MG/1
TABLET ORAL
Qty: 180 TABLET | Refills: 0 | Status: SHIPPED | OUTPATIENT
Start: 2024-10-21

## 2024-10-21 NOTE — TELEPHONE ENCOUNTER
Prescription approved per [facility] Refill Protocol.  Yissel Anthony RN  [facility]

## 2024-11-01 DIAGNOSIS — I10 BENIGN ESSENTIAL HYPERTENSION: ICD-10-CM

## 2024-11-01 RX ORDER — SPIRONOLACTONE 25 MG/1
TABLET ORAL
Qty: 90 TABLET | Refills: 0 | Status: SHIPPED | OUTPATIENT
Start: 2024-11-01

## 2024-12-17 NOTE — TELEPHONE ENCOUNTER
Accompanied Dr. Gupta Prescription approved per [facility] Refill Protocol.  Ciera Everett RN

## 2024-12-23 DIAGNOSIS — I10 BENIGN ESSENTIAL HYPERTENSION: ICD-10-CM

## 2024-12-23 RX ORDER — AMLODIPINE BESYLATE 10 MG/1
TABLET ORAL
Qty: 45 TABLET | Refills: 0 | Status: SHIPPED | OUTPATIENT
Start: 2024-12-23

## 2025-01-21 DIAGNOSIS — I10 BENIGN ESSENTIAL HYPERTENSION: ICD-10-CM

## 2025-01-21 RX ORDER — LISINOPRIL 20 MG/1
TABLET ORAL
Qty: 180 TABLET | Refills: 0 | Status: SHIPPED | OUTPATIENT
Start: 2025-01-21

## 2025-02-08 DIAGNOSIS — I10 BENIGN ESSENTIAL HYPERTENSION: ICD-10-CM

## 2025-02-08 DIAGNOSIS — I25.10 CORONARY ARTERY DISEASE INVOLVING NATIVE CORONARY ARTERY OF NATIVE HEART WITHOUT ANGINA PECTORIS: ICD-10-CM

## 2025-02-10 RX ORDER — ATENOLOL 100 MG/1
100 TABLET ORAL DAILY
Qty: 90 TABLET | Refills: 0 | Status: SHIPPED | OUTPATIENT
Start: 2025-02-10

## 2025-02-14 DIAGNOSIS — K21.9 GASTROESOPHAGEAL REFLUX DISEASE: ICD-10-CM

## 2025-02-17 RX ORDER — OMEPRAZOLE 20 MG/1
CAPSULE, DELAYED RELEASE ORAL
Qty: 90 CAPSULE | Refills: 0 | Status: SHIPPED | OUTPATIENT
Start: 2025-02-17

## 2025-02-17 NOTE — TELEPHONE ENCOUNTER
Prescription approved per [facility] Refill Protocol.  Yissel Anthony RN  [facility]

## 2025-03-22 ENCOUNTER — HEALTH MAINTENANCE LETTER (OUTPATIENT)
Age: 75
End: 2025-03-22

## 2025-04-05 DIAGNOSIS — I25.10 CORONARY ARTERY DISEASE INVOLVING NATIVE CORONARY ARTERY OF NATIVE HEART WITHOUT ANGINA PECTORIS: ICD-10-CM

## 2025-04-07 RX ORDER — ATORVASTATIN CALCIUM 40 MG/1
40 TABLET, FILM COATED ORAL DAILY
Qty: 90 TABLET | Refills: 0 | Status: SHIPPED | OUTPATIENT
Start: 2025-04-07

## 2025-04-07 NOTE — TELEPHONE ENCOUNTER
Pres[place] [facility] [place].  Yissel Anthony RN  [facility]

## 2025-04-27 DIAGNOSIS — I10 BENIGN ESSENTIAL HYPERTENSION: ICD-10-CM

## 2025-04-28 RX ORDER — LISINOPRIL 20 MG/1
TABLET ORAL
Qty: 180 TABLET | Refills: 0 | Status: SHIPPED | OUTPATIENT
Start: 2025-04-28

## 2025-04-28 RX ORDER — SPIRONOLACTONE 25 MG/1
TABLET ORAL
Qty: 90 TABLET | Refills: 0 | Status: SHIPPED | OUTPATIENT
Start: 2025-04-28

## 2025-04-29 SDOH — HEALTH STABILITY: PHYSICAL HEALTH: ON AVERAGE, HOW MANY DAYS PER WEEK DO YOU ENGAGE IN MODERATE TO STRENUOUS EXERCISE (LIKE A BRISK WALK)?: 6 DAYS

## 2025-04-29 SDOH — HEALTH STABILITY: PHYSICAL HEALTH: ON AVERAGE, HOW MANY MINUTES DO YOU ENGAGE IN EXERCISE AT THIS LEVEL?: 30 MIN

## 2025-05-01 ENCOUNTER — OFFICE VISIT (OUTPATIENT)
Dept: INTERNAL MEDICINE | Facility: CLINIC | Age: 75
End: 2025-05-01
Payer: COMMERCIAL

## 2025-05-01 VITALS
HEART RATE: 70 BPM | SYSTOLIC BLOOD PRESSURE: 134 MMHG | DIASTOLIC BLOOD PRESSURE: 80 MMHG | WEIGHT: 138.6 LBS | TEMPERATURE: 97.7 F | RESPIRATION RATE: 16 BRPM | BODY MASS INDEX: 26.17 KG/M2 | HEIGHT: 61 IN | OXYGEN SATURATION: 97 %

## 2025-05-01 DIAGNOSIS — F10.11 HISTORY OF ALCOHOL ABUSE: ICD-10-CM

## 2025-05-01 DIAGNOSIS — M85.80 OSTEOPENIA, UNSPECIFIED LOCATION: ICD-10-CM

## 2025-05-01 DIAGNOSIS — Z13.1 SCREENING FOR DIABETES MELLITUS: ICD-10-CM

## 2025-05-01 DIAGNOSIS — F33.42 RECURRENT MAJOR DEPRESSIVE DISORDER, IN FULL REMISSION: ICD-10-CM

## 2025-05-01 DIAGNOSIS — R31.29 MICROSCOPIC HEMATURIA: ICD-10-CM

## 2025-05-01 DIAGNOSIS — Z23 HIGH PRIORITY FOR 2019-NCOV VACCINE: ICD-10-CM

## 2025-05-01 DIAGNOSIS — F41.1 GAD (GENERALIZED ANXIETY DISORDER): ICD-10-CM

## 2025-05-01 DIAGNOSIS — Z00.00 MEDICARE ANNUAL WELLNESS VISIT, SUBSEQUENT: Primary | ICD-10-CM

## 2025-05-01 DIAGNOSIS — N18.31 STAGE 3A CHRONIC KIDNEY DISEASE (H): ICD-10-CM

## 2025-05-01 DIAGNOSIS — E55.9 VITAMIN D DEFICIENCY: ICD-10-CM

## 2025-05-01 DIAGNOSIS — I25.10 CORONARY ARTERY DISEASE INVOLVING NATIVE CORONARY ARTERY OF NATIVE HEART WITHOUT ANGINA PECTORIS: ICD-10-CM

## 2025-05-01 DIAGNOSIS — L40.9 PSORIASIS: ICD-10-CM

## 2025-05-01 DIAGNOSIS — K21.9 GASTROESOPHAGEAL REFLUX DISEASE WITHOUT ESOPHAGITIS: ICD-10-CM

## 2025-05-01 DIAGNOSIS — I10 BENIGN ESSENTIAL HYPERTENSION: ICD-10-CM

## 2025-05-01 LAB
ALBUMIN SERPL BCG-MCNC: 4.2 G/DL (ref 3.5–5.2)
ALP SERPL-CCNC: 72 U/L (ref 40–150)
ALT SERPL W P-5'-P-CCNC: 29 U/L (ref 0–50)
ANION GAP SERPL CALCULATED.3IONS-SCNC: 12 MMOL/L (ref 7–15)
AST SERPL W P-5'-P-CCNC: 28 U/L (ref 0–45)
BILIRUB SERPL-MCNC: 0.5 MG/DL
BUN SERPL-MCNC: 17.4 MG/DL (ref 8–23)
CALCIUM SERPL-MCNC: 9.6 MG/DL (ref 8.8–10.4)
CHLORIDE SERPL-SCNC: 95 MMOL/L (ref 98–107)
CREAT SERPL-MCNC: 0.89 MG/DL (ref 0.51–0.95)
EGFRCR SERPLBLD CKD-EPI 2021: 67 ML/MIN/1.73M2
GLUCOSE SERPL-MCNC: 111 MG/DL (ref 70–99)
HCO3 SERPL-SCNC: 24 MMOL/L (ref 22–29)
POTASSIUM SERPL-SCNC: 4.7 MMOL/L (ref 3.4–5.3)
PROT SERPL-MCNC: 6.8 G/DL (ref 6.4–8.3)
SODIUM SERPL-SCNC: 131 MMOL/L (ref 135–145)
VIT D+METAB SERPL-MCNC: 53 NG/ML (ref 20–50)

## 2025-05-01 ASSESSMENT — PATIENT HEALTH QUESTIONNAIRE - PHQ9
SUM OF ALL RESPONSES TO PHQ QUESTIONS 1-9: 6
SUM OF ALL RESPONSES TO PHQ QUESTIONS 1-9: 6
10. IF YOU CHECKED OFF ANY PROBLEMS, HOW DIFFICULT HAVE THESE PROBLEMS MADE IT FOR YOU TO DO YOUR WORK, TAKE CARE OF THINGS AT HOME, OR GET ALONG WITH OTHER PEOPLE: NOT DIFFICULT AT ALL

## 2025-05-01 NOTE — PROGRESS NOTES
ASSESSMENT/PLAN                                                       (Z00.00) Medicare annual wellness visit, subsequent  (primary encounter diagnosis)  Comment: PMH, PSH, FH, SH, medications, allergies, immunizations, and preventative health measures reviewed and updated as appropriate.  Plan: see below for plans.      (F41.1) VISHAL (generalized anxiety disorder)  (F33.42) Recurrent major depressive disorder, in full remission  Comment: well-controlled with counseling alone.     (I10) Benign essential hypertension  Comment: well-controlled on amlodipine, lisinopril, spironolactone, and atenolol.     (I25.10) Coronary artery disease involving native coronary artery of native heart without angina pectoris  Comment: 2006; [facility]; acute NSTEMI, severe single vessel disease in RCA, s/p PTCA/EUGENIO; on optimal medical therapy with aspirin, statin, and BB.     (F10.11) History of alcohol abuse  Comment: sober since 1987.     (K21.9) Gastroesophageal reflux disease without esophagitis  Comment: well-controlled on omeprazole.     (R31.29) Microscopic hematuria  Comment: multiple negative work-ups.     (M85.80) Osteopenia, unspecified location  Plan: repeat DEXA 2026.     (L40.9) Psoriasis  Comment: well-controlled with topical steroids as needed.     (Z23) High priority for 2019-nCoV vaccine  Comment: COVID-19 booster given today.     (Z13.1) Screening for diabetes mellitus  (N18.31) Stage 3a chronic kidney disease (H)  (E55.9) Vitamin D deficiency  Plan: fasting labs today; recommendations to follow.     Appropriate preventive services were discussed with this patient, including applicable screening as appropriate for cardiovascular disease, diabetes, osteopenia/osteoporosis, and glaucoma.  As appropriate for age/gender, discussed screening for colorectal cancer, prostate cancer, breast cancer, and cervical cancer. Checklist reviewing preventive services available has been given to the patient.    Reviewed patients plan of  care. The Basic Care Plan (routine screening as documented in Health Maintenance) for Liz Haider meets the Care Plan requirement. This Care Plan has been established and reviewed with the Patient.    Sydnee Miller MD   [address] [address]  T: [phone], F: [phone]    SUBJECTIVE                                                      Liz Haider is a very pleasant 74 year old female who presents for her subsequent AWV:    Current providers (other than myself): n/a     PMH, PSH, FH, SH, medications, allergies, immunizations, preventative health, and health risk assessment reviewed and updated as appropriate.    Past Medical History:   Diagnosis Date    Acid reflux disease     Benign essential hypertension     CAD (coronary artery disease) 2006    [facility]; acute NSTEMI, severe single vessel disease in RCA, PTCA/EUGENIO    Chronic kidney disease, stage III (moderate) (H)     VISHAL (generalized anxiety disorder)     History of alcohol abuse     sober since 1987    History of basal cell carcinoma     multiple around right eye    MDD (major depressive disorder)     Microscopic hematuria     multiple, negative work-ups    Osteopenia     Psoriasis      Past Surgical History:   Procedure Laterality Date    CATARACT EXTRACTION, BILATERAL  2023    COLONOSCOPY  04/20/2012    Procedure:COLONOSCOPY; COLONOSCOPY; Surgeon:EDSON SHELLEY; Location: GI    DAVINCI RECTOPEXY N/A 10/31/2018    Procedure: ROBOTIC ASSISTED XI VENTRAL RECTOPEXY (DENIS) ;  Surgeon: Kirsten Huyhn MD;  Location:  OR    DAVINCI RECTOPEXY  2022    for rectal prolapse    DAVINCI SACROCOLPOPEXY, CYSTOSCOPY, COMBINED N/A 10/31/2018    Procedure: ROBOTIC ASSISTED XI SACROCOLPOPEXY, CYSTOSCOPY (CEFERINO), BILATERAL SALPINGO-OORPHORECTOMY, MID URETHRAL SLING;  Surgeon: Turner Jonas MD;  Location:  OR    DAVINCI SACROCOLPOPEXY, MIDURETHRAL SLING, CYSTOSCOPY  10/31/2018    Procedure: DAVINCI  SACROCOLPOPEXY, MIDURETHRAL SLING, CYSTOSCOPY;  Surgeon: Turner Jonas MD;  Location: [facility] OR    DAVINCI SALPINGO-OOPHORECTOMY INCLUDING BILATERAL Bilateral 10/31/2018    Procedure: DAVINCI SALPINGO-OOPHORECTOMY INCLUDING BILATERAL;  Surgeon: Turner Jonas MD;  Location: [facility] OR    HYSTERECTOMY TOTAL ABDOMINAL  1988    for heavy periods    TONSILLECTOMY & ADENOIDECTOMY  1952     Family History   Problem Relation Age of Onset    Dementia Mother     Depression Mother     Anxiety Disorder Mother     Myocardial Infarction Father         later in life    Diabetes Type 2  Sister     Bladder Cancer Sister         smoking history    Frontotemporal dementia Sister     Cirrhosis Sister         non-alcoholic    Hypertension Sister     Tongue cancer Brother     Diabetes Type 2  Brother     Dementia Brother     Cerebrovascular Disease No family hx of     Coronary Artery Disease Early Onset No family hx of     Breast Cancer No family hx of     Ovarian Cancer No family hx of     Colon Cancer No family hx of      Social History     Occupational History    Occupation: Retired - //   Tobacco Use    Smoking status: Former     Types: Cigarettes    Smokeless tobacco: Never    Tobacco comments:     Quit in 2006; smoked for 10 years; at most 1ppd.   Vaping Use    Vaping status: Never Used   Substance and Sexual Activity    Alcohol use: No     Comment: history of alcohol abuse; sober since 1987    Drug use: No    Sexual activity: Not Currently   Social History Narrative     (as of 2020).     No children.    No formal exercise. Walks dog daily.      Allergies   Allergen Reactions    Penicillins Hives    Sulfa Antibiotics Hives     Current Outpatient Medications   Medication Sig    amLODIPine (NORVASC) 10 MG tablet Take 1/2 (one-half) tablet by mouth once daily    aspirin (ASA) 81 MG tablet Take 1 tablet (81 mg) by mouth daily    atenolol (TENORMIN) 100 MG tablet Take 1 tablet (100 mg) by mouth  daily.    atorvastatin (LIPITOR) 40 MG tablet Take 1 tablet by mouth once daily    bimatoprost (LATISSE) 0.03 % external opthalmic solution Apply 1 drop topically at bedtime.    Biotin 1000 MCG CHEW Take 1,000 mcg by mouth daily    Calcium Carbonate-Vit D-Min (CALCIUM 1200) 3534-3855 MG-UNIT CHEW Take 1 chew tab by mouth daily    doxycycline monohydrate (ADOXA) 100 MG tablet Take 1 tablet (100 mg) by mouth 2 times daily for 7 days.    hydrocortisone 2.5 % cream Apply to face BID x 7-10 days then PRN only    lisinopril (ZESTRIL) 20 MG tablet Take 1 tablet by mouth twice daily    LORazepam (ATIVAN) 0.5 MG tablet TAKE 1 TABLET BY MOUTH ONCE DAILY AS NEEDED FOR ANXIETY    omeprazole (PRILOSEC) 20 MG DR capsule Take 1 capsule by mouth once daily    spironolactone (ALDACTONE) 25 MG tablet Take 1 tablet by mouth once daily    vitamin D3 (CHOLECALCIFEROL) 1000 units (25 mcg) tablet Take 1 tablet (1,000 Units) by mouth daily    zolpidem ER (AMBIEN CR) 6.25 MG CR tablet      Immunization History   Administered Date(s) Administered    COVID-19 12+ (Pfizer) 12/11/2023, 09/26/2024    COVID-19 Bivalent 12+ (Pfizer) 05/02/2023    COVID-19 Bivalent 18+ (Moderna) 09/09/2022    COVID-19 MONOVALENT 12+ (Pfizer) 03/04/2021, 03/25/2021, 09/28/2021    COVID-19 Monovalent 12+ (Pfizer 2022) 04/11/2022    COVID-19 Monovalent Booster 18+ (Moderna) 09/09/2022    Flu 65+ (Fluad) 09/11/2014, 09/21/2015, 09/07/2016, 08/29/2017, 09/13/2018, 09/09/2019    Flu, Unspecified 10/29/1997, 11/04/2007, 10/05/2008, 10/01/2012, 09/19/2013    Influenza (H1N1) 01/05/2010    Influenza (High Dose) Trivalent,PF (Fluzone) 09/28/2010, 09/11/2014, 09/21/2015, 09/07/2016, 08/29/2017, 09/13/2018, 09/09/2019, 07/30/2024    Influenza (IIV3) PF 10/29/1997, 11/04/2007, 10/05/2008, 10/01/2012, 09/19/2013    Influenza Vaccine 65+ (FLUAD) 09/18/2021, 09/09/2022    Influenza Vaccine 65+ (Fluzone HD) 09/09/2022, 10/09/2023    Influenza Vaccine >6 months,quad, PF 09/04/2020     Influenza Vaccine, 6+MO IM (QUADRIVALENT W/PRESERVATIVES) 09/18/2021    Nasal Influenza Vaccine 2-49 (FluMist) 09/04/2020    Pneumo Conj 13-V (2010&after) 04/14/2015    Pneumococcal 20 valent Conjugate (Prevnar 20) 04/20/2023    Pneumococcal 23 valent 12/04/2007, 04/17/2018    RSV Vaccine (Arexvy) 12/11/2023    TD,PF 7+ (Tenivac) 02/08/2006    TDAP Vaccine (Adacel) 02/09/2016    Td (Adult), Adsorbed 02/08/2006    Zoster recombinant adjuvanted (Shingrix) 05/21/2018, 09/13/2018    Zoster vaccine, live 12/31/2008     PREVENTATIVE HEALTH                                                      BMI: overweight  Blood pressure: well-controlled on current regimen   Breast CA screening: up to date   Colon CA screening: screening no longer indicated  Lung CA screening: patient does not meet screening criteria  Dexa: up to date   Screening cholesterol: n/a - already being treated for this condition  Screening diabetes: DUE  Alcohol misuse screening: alcohol use reviewed - no intervention indicated at this time  Immunizations: reviewed;  COVID-19 booster DUE    HEALTH RISK ASSESSMENT                                                      In general, how would you rate your overall physical health? fair  Outside of work, how many days during the week do you exercise? 6 days/week  Outside of work, approximately how many minutes a day do you exercise? 15-30 minutes    If you drink alcohol do you typically have >3 drinks per day or >7 drinks per week? No     Assistance with daily activities: No    Safety concerns: No    Fall risk assessment: completed today (see ambulatory assessments)    Hearing concerns: No    In the past 6 months, have you been bothered by leaking of urine: No    Do you have a current opioid prescription? No  Do you use any other controlled substances or medications that are not prescribed by a provider? None    PHQ-2/PHQ-9 assessment: completed today (see ambulatory assessments)    Additional concerns today:  "No    Have you ever done Advance Care Planning? (For example, a Health Directive, POLST, or a discussion with a medical provider or your loved ones about your wishes): Yes    OBJECTIVE                                                      /80   Pulse 70   Temp 97.7  F (36.5  C) (Temporal)   Resp 16   Ht 1.549 m (5' 1\")   Wt 62.9 kg (138 lb 9.6 oz)   LMP  (LMP Unknown)   SpO2 97%   BMI 26.19 kg/m    Constitutional: well-appearing  Head, Ears, and Eyes: normocephalic; normal external auditory canal and pinna; tympanic membranes visualized and normal; normal lids and conjunctivae  Neck: supple, symmetric, no thyromegaly or lymphadenopathy  Respiratory: normal respiratory effort; clear to auscultation bilaterally  Cardiovascular: regular rate and rhythm; no edema  Gastrointestinal: soft, non-tender, and non-distended; no organomegaly or masses  Musculoskeletal: normal gait and station  Psych: normal judgment and insight; normal mood and affect; recent and remote memory intact    Cognitive impairment noted: No  ---  (Note was completed, in part, with [employer] voice-recognition software. Documentation was reviewed, but some grammatical, spelling, and word errors may remain.)  "

## 2025-05-15 DIAGNOSIS — I25.10 CORONARY ARTERY DISEASE INVOLVING NATIVE CORONARY ARTERY OF NATIVE HEART WITHOUT ANGINA PECTORIS: ICD-10-CM

## 2025-05-15 DIAGNOSIS — I10 BENIGN ESSENTIAL HYPERTENSION: ICD-10-CM

## 2025-05-15 DIAGNOSIS — K21.9 GASTROESOPHAGEAL REFLUX DISEASE: ICD-10-CM

## 2025-05-15 RX ORDER — OMEPRAZOLE 20 MG/1
20 CAPSULE, DELAYED RELEASE ORAL DAILY
Qty: 90 CAPSULE | Refills: 3 | Status: SHIPPED | OUTPATIENT
Start: 2025-05-15

## 2025-05-15 RX ORDER — ATENOLOL 100 MG/1
100 TABLET ORAL DAILY
Qty: 90 TABLET | Refills: 1 | Status: SHIPPED | OUTPATIENT
Start: 2025-05-15

## 2025-05-15 NOTE — TELEPHONE ENCOUNTER
Medication Question or Refill    Contacts       Contact Date/Time Type Contact Phone/Fax    05/15/2025 10:37 AM CDT Phone (Incoming) [facility] [address] [place] [address] (Pharmacy) [phone]            What medication are you calling about (include dose and sig)?: atenolol (TENORMIN) 100 MG tablet [I25.10] ,I10]   meprazole (PRILOSEC) 20 MG DR capsule K21.9]   Preferred Pharmacy:    [facility] [address] [place] [id number]  Phone: [phone] Fax: [phone]      Controlled Substance Agreement on file:   CSA -- Patient Level:    CSA: None found at the patient level.       Who prescribed the medication?: Dr. Miller    Do you need a refill? Yes

## 2025-06-15 ENCOUNTER — HOSPITAL ENCOUNTER (EMERGENCY)
Facility: CLINIC | Age: 75
Discharge: HOME | End: 2025-06-15
Attending: EMERGENCY MEDICINE | Admitting: EMERGENCY MEDICINE
Payer: COMMERCIAL

## 2025-06-15 ENCOUNTER — APPOINTMENT (OUTPATIENT)
Dept: GENERAL RADIOLOGY | Facility: CLINIC | Age: 75
End: 2025-06-15
Attending: EMERGENCY MEDICINE
Payer: COMMERCIAL

## 2025-06-15 VITALS
WEIGHT: 140 LBS | TEMPERATURE: 98.3 F | HEIGHT: 61 IN | HEART RATE: 66 BPM | RESPIRATION RATE: 18 BRPM | BODY MASS INDEX: 26.43 KG/M2 | SYSTOLIC BLOOD PRESSURE: 107 MMHG | DIASTOLIC BLOOD PRESSURE: 65 MMHG | OXYGEN SATURATION: 98 %

## 2025-06-15 DIAGNOSIS — R61 DIAPHORESIS: ICD-10-CM

## 2025-06-15 LAB
ALBUMIN SERPL BCG-MCNC: 4.2 G/DL (ref 3.5–5.2)
ALP SERPL-CCNC: 62 U/L (ref 40–150)
ALT SERPL W P-5'-P-CCNC: 23 U/L (ref 0–50)
ANION GAP SERPL CALCULATED.3IONS-SCNC: 13 MMOL/L (ref 7–15)
AST SERPL W P-5'-P-CCNC: 22 U/L (ref 0–45)
BASOPHILS # BLD AUTO: 0.1 10E3/UL (ref 0–0.2)
BASOPHILS NFR BLD AUTO: 1 %
BILIRUB SERPL-MCNC: 0.4 MG/DL
BUN SERPL-MCNC: 24.4 MG/DL (ref 8–23)
CALCIUM SERPL-MCNC: 9.4 MG/DL (ref 8.8–10.4)
CHLORIDE SERPL-SCNC: 97 MMOL/L (ref 98–107)
CREAT SERPL-MCNC: 1.1 MG/DL (ref 0.51–0.95)
EGFRCR SERPLBLD CKD-EPI 2021: 52 ML/MIN/1.73M2
EOSINOPHIL # BLD AUTO: 0.2 10E3/UL (ref 0–0.7)
EOSINOPHIL NFR BLD AUTO: 2 %
ERYTHROCYTE [DISTWIDTH] IN BLOOD BY AUTOMATED COUNT: 13.2 % (ref 10–15)
GLUCOSE SERPL-MCNC: 105 MG/DL (ref 70–99)
HCO3 SERPL-SCNC: 24 MMOL/L (ref 22–29)
HCT VFR BLD AUTO: 39.9 % (ref 35–47)
HGB BLD-MCNC: 13.4 G/DL (ref 11.7–15.7)
HOLD SPECIMEN: NORMAL
IMM GRANULOCYTES # BLD: 0 10E3/UL
IMM GRANULOCYTES NFR BLD: 0 %
LYMPHOCYTES # BLD AUTO: 1.7 10E3/UL (ref 0.8–5.3)
LYMPHOCYTES NFR BLD AUTO: 19 %
MAGNESIUM SERPL-MCNC: 1.7 MG/DL (ref 1.7–2.3)
MCH RBC QN AUTO: 30.5 PG (ref 26.5–33)
MCHC RBC AUTO-ENTMCNC: 33.6 G/DL (ref 31.5–36.5)
MCV RBC AUTO: 91 FL (ref 78–100)
MONOCYTES # BLD AUTO: 0.7 10E3/UL (ref 0–1.3)
MONOCYTES NFR BLD AUTO: 7 %
NEUTROPHILS # BLD AUTO: 6.3 10E3/UL (ref 1.6–8.3)
NEUTROPHILS NFR BLD AUTO: 70 %
NRBC # BLD AUTO: 0 10E3/UL
NRBC BLD AUTO-RTO: 0 /100
PLATELET # BLD AUTO: 232 10E3/UL (ref 150–450)
POTASSIUM SERPL-SCNC: 4.9 MMOL/L (ref 3.4–5.3)
PROT SERPL-MCNC: 6.6 G/DL (ref 6.4–8.3)
RBC # BLD AUTO: 4.39 10E6/UL (ref 3.8–5.2)
SODIUM SERPL-SCNC: 134 MMOL/L (ref 135–145)
TROPONIN T SERPL HS-MCNC: 8 NG/L
TROPONIN T SERPL HS-MCNC: <6 NG/L
WBC # BLD AUTO: 9 10E3/UL (ref 4–11)

## 2025-06-15 PROCEDURE — 36415 COLL VENOUS BLD VENIPUNCTURE: CPT | Performed by: EMERGENCY MEDICINE

## 2025-06-15 PROCEDURE — 84484 ASSAY OF TROPONIN QUANT: CPT | Performed by: EMERGENCY MEDICINE

## 2025-06-15 PROCEDURE — 85025 COMPLETE CBC W/AUTO DIFF WBC: CPT | Performed by: EMERGENCY MEDICINE

## 2025-06-15 PROCEDURE — 83735 ASSAY OF MAGNESIUM: CPT | Performed by: EMERGENCY MEDICINE

## 2025-06-15 PROCEDURE — 93005 ELECTROCARDIOGRAM TRACING: CPT

## 2025-06-15 PROCEDURE — 82040 ASSAY OF SERUM ALBUMIN: CPT | Performed by: EMERGENCY MEDICINE

## 2025-06-15 PROCEDURE — 71046 X-RAY EXAM CHEST 2 VIEWS: CPT

## 2025-06-15 PROCEDURE — 99285 EMERGENCY DEPT VISIT HI MDM: CPT | Mod: 25

## 2025-06-15 ASSESSMENT — COLUMBIA-SUICIDE SEVERITY RATING SCALE - C-SSRS
2. HAVE YOU ACTUALLY HAD ANY THOUGHTS OF KILLING YOURSELF IN THE PAST MONTH?: NO
6. HAVE YOU EVER DONE ANYTHING, STARTED TO DO ANYTHING, OR PREPARED TO DO ANYTHING TO END YOUR LIFE?: NO
1. IN THE PAST MONTH, HAVE YOU WISHED YOU WERE DEAD OR WISHED YOU COULD GO TO SLEEP AND NOT WAKE UP?: NO

## 2025-06-15 ASSESSMENT — ACTIVITIES OF DAILY LIVING (ADL)
ADLS_ACUITY_SCORE: 42

## 2025-06-15 NOTE — ED TRIAGE NOTES
Arrived via EMS for c/o sweating and palpitations. Pt took 4 or 5 pills of ASA 325mg, thinking that is a baby aspirin. History of heart attack around 18 years ago per Pt statement. VS stable, on RA, NSR.  per EMS.     Triage Assessment (Adult)       Row Name 06/15/25 0817          Triage Assessment    Airway WDL WDL        Respiratory WDL    Respiratory WDL WDL        Cardiac WDL    Cardiac WDL X  CP resolved        Cognitive/Neuro/Behavioral WDL    Cognitive/Neuro/Behavioral WDL WDL

## 2025-06-15 NOTE — DISCHARGE INSTRUCTIONS
What do you do next:   Continue your home medications unless we have specifically changed them  If medications were prescribed today, take these as directed.  Only take aspirin at the dose as recommended on the bottle.  Follow up as indicated below    When do you return: Review your discharge papers for specifics on reasons to return.    Thank you for allowing us to care for you today.

## 2025-06-15 NOTE — ED PROVIDER NOTES
"Emergency Department Note      Code Status: Prior    History of Present Illness     Chief Complaint:  Chest Pain       HPI   Liz Haider is a 74 year old female who presents due to concern for MI.    The patient states that she has had 3 episodes this week of cold sweats and she notes that those symptoms were what she presented with when she had her heart attack 18 years ago and received a stent.  She has not received stents since then.  She does take aspirin daily.    The patient denies outright chest pain or shortness of breath though she does note some chest tightness today.  She notes there was some nausea 2 or 3 days ago but she has not vomited nor had diarrhea.  There have been no reported fevers either.    The patient notes that last Sunday she was going up and down the stairs and out to her garbage can when she had the sweatiness.  Today she was at rest when her symptoms occurred.  There has been no lightheadedness or fainting.    Notably, the patient took 4 or perhaps 5 full dose (325) milligram aspirin tablets today.    Independent Historian:    None    Review of External Notes  5/1/25 Annual wellness exam: VISHAL (well controlled with counseling alone), HTN (amlodipine, lisinopril, Aldactone, Atenolol), CAD (2006 @ [facility]: NSTEMI, severe single vessel dz in RCA s/p EUGENIO; on ASA, statin, BB), etc.    Past Medical History   Medical History, Surgical History, Problem List, and Medications  Reviewed in Epic    Physical Exam   Patient Vitals for the past 24 hrs:   BP Temp Temp src Pulse Resp SpO2 Height Weight   06/15/25 1238 107/65 -- -- 66 18 98 % -- --   06/15/25 1130 113/69 -- -- 73 18 99 % -- --   06/15/25 1030 (!) 174/89 -- -- 72 14 98 % -- --   06/15/25 0920 (!) 152/84 -- -- 76 21 99 % -- --   06/15/25 0900 133/77 -- -- 71 21 99 % -- --   06/15/25 0830 123/69 -- -- 75 25 98 % -- --   06/15/25 0820 138/75 -- -- 73 17 -- -- --   06/15/25 0816 138/75 98.3  F (36.8  C) Oral 80 18 97 % 1.549 m (5' 1\") 63.5 kg " (140 lb)         Physical Exam  Constitutional: Vital signs reviewed as above.   Eyes: PEERL, EOMI B/L  Neck: No JVD noted. FROM   Cardiovascular: normal rate, Regular rhythm and normal heart sounds.  No murmur heard. Equal B/L peripheral pulses.  Pulmonary/Chest: Effort normal and breath sounds normal. No respiratory distress. Patient has no wheezes. Patient has no rales.   Gastrointestinal: Soft. There is no tenderness.   Musculoskeletal/Extremities: No pitting edema noted. Normal tone.  Skin: Skin is warm and dry. There is no diaphoresis noted.   Psychiatric: The patient appears calm.       Diagnostics     Laboratory: Imaging:   Labs Ordered and Resulted from Time of ED Arrival to Time of ED Departure   COMPREHENSIVE METABOLIC PANEL - Abnormal       Result Value    Sodium 134 (*)     Potassium 4.9      Carbon Dioxide (CO2) 24      Anion Gap 13      Urea Nitrogen 24.4 (*)     Creatinine 1.10 (*)     GFR Estimate 52 (*)     Calcium 9.4      Chloride 97 (*)     Glucose 105 (*)     Alkaline Phosphatase 62      AST 22      ALT 23      Protein Total 6.6      Albumin 4.2      Bilirubin Total 0.4     TROPONIN T, HIGH SENSITIVITY - Normal    Troponin T, High Sensitivity 8     MAGNESIUM - Normal    Magnesium 1.7     TROPONIN T, HIGH SENSITIVITY - Normal    Troponin T, High Sensitivity <6     CBC WITH PLATELETS AND DIFFERENTIAL    WBC Count 9.0      RBC Count 4.39      Hemoglobin 13.4      Hematocrit 39.9      MCV 91      MCH 30.5      MCHC 33.6      RDW 13.2      Platelet Count 232      % Neutrophils 70      % Lymphocytes 19      % Monocytes 7      % Eosinophils 2      % Basophils 1      % Immature Granulocytes 0      NRBCs per 100 WBC 0      Absolute Neutrophils 6.3      Absolute Lymphocytes 1.7      Absolute Monocytes 0.7      Absolute Eosinophils 0.2      Absolute Basophils 0.1      Absolute Immature Granulocytes 0.0      Absolute NRBCs 0.0       Chest XR,  PA & LAT   Final Result   IMPRESSION: No pleural fluid or  pneumothorax. No airspace disease or edema. Normal size of the heart.            EKG   ECG results from 06/15/25   EKG 12-lead, tracing only     Value    Systolic Blood Pressure     Diastolic Blood Pressure     Ventricular Rate 74    Atrial Rate 74    MT Interval 170    QRS Duration 66        QTc 435    P Axis 61    R AXIS -13    T Axis 51    Interpretation ECG      Sinus rhythm  Low voltage QRS  Borderline ECG  When compared with ECG of 19-Nov-2023 06:57,  No significant change was found  Interpreted by me at 1420        Independent Interpretation  See ED course    ED Course    Medications Administered  Medications - No data to display    Procedures  Procedures     Discussion of Management  See ED Course    ED Course  ED Course as of 06/15/25 1421   Sun Gerry 15, 2025   0812 Initial evaluation   0827 D/w [place] [facility]. No need to worry about ASA toxicity at the reported dose.   1226 Rechecked and updated.       Optional/Additional Documentation: None    Medical Decision Making / Diagnosis     MIPS     None    Medical Decision Making:  Liz Haider is a 74 year old female presented to the Emergency Department with a complaint of sweatiness and concern for an heart attack. Fortunately the workup in the ED has been unremarkable and at this time I am not concerned for ACS. The EKG shows sinus rhythm without significant change from previous. The troponin is negative x2. Based on the [facility] high sensitivity troponin pathway, this should rule the patient out for myocardial injury.    I considered other possible causes of chest pain including PE (low risk Well's Criteria), infection, pneumothorax, aortic dissection, inflammatory conditions (percarditis, etc.) and even more benign causes such as reflux and esophageal motility issues. The physical exam, laboratory, and radiological findings listed above make life threatening conditions less likely. At this time I believe the patient is safe for discharge. I have  encouraged close outpatient follow up.     Anticipatory guidance given prior to discharge.         Critical Care:  None.    Disposition:  See ED Course and MDM    ICD-10 Codes:    ICD-10-CM    1. Diaphoresis  R61            Discharge Medications:  Discharge Medication List as of 6/15/2025 12:37 PM           6/15/2025   Joseph Acuna DO     [employer]                    Joseph Acuna DO  06/15/25 1429

## 2025-06-16 LAB
ATRIAL RATE - MUSE: 74 BPM
DIASTOLIC BLOOD PRESSURE - MUSE: NORMAL MMHG
INTERPRETATION ECG - MUSE: NORMAL
P AXIS - MUSE: 61 DEGREES
PR INTERVAL - MUSE: 170 MS
QRS DURATION - MUSE: 66 MS
QT - MUSE: 392 MS
QTC - MUSE: 435 MS
R AXIS - MUSE: -13 DEGREES
SYSTOLIC BLOOD PRESSURE - MUSE: NORMAL MMHG
T AXIS - MUSE: 51 DEGREES
VENTRICULAR RATE- MUSE: 74 BPM

## 2025-06-23 ENCOUNTER — ANCILLARY PROCEDURE (OUTPATIENT)
Dept: MAMMOGRAPHY | Facility: CLINIC | Age: 75
End: 2025-06-23
Attending: INTERNAL MEDICINE
Payer: COMMERCIAL

## 2025-06-23 DIAGNOSIS — Z12.31 VISIT FOR SCREENING MAMMOGRAM: ICD-10-CM

## 2025-06-23 PROCEDURE — 77067 SCR MAMMO BI INCL CAD: CPT | Mod: TC | Performed by: RADIOLOGY

## 2025-06-23 PROCEDURE — 77063 BREAST TOMOSYNTHESIS BI: CPT | Mod: TC | Performed by: RADIOLOGY

## 2025-06-23 NOTE — TELEPHONE ENCOUNTER
Routing refill request to provider for review/approval because:  Drug not on the [facility] refill protocol          Wound Care: Twice daily wound care as discussed.   Loosen the bandage if needed.   Monitor for any signs of infection including worsening redness, swelling, purulent discharge, fever, body aches, or chills and seek follow up care as needed.

## 2025-06-24 ENCOUNTER — RESULTS FOLLOW-UP (OUTPATIENT)
Dept: INTERNAL MEDICINE | Facility: CLINIC | Age: 75
End: 2025-06-24

## 2025-06-24 ENCOUNTER — TELEPHONE (OUTPATIENT)
Dept: INTERNAL MEDICINE | Facility: CLINIC | Age: 75
End: 2025-06-24
Payer: COMMERCIAL

## 2025-06-24 DIAGNOSIS — I10 BENIGN ESSENTIAL HYPERTENSION: ICD-10-CM

## 2025-06-24 RX ORDER — AMLODIPINE BESYLATE 10 MG/1
5 TABLET ORAL DAILY
Qty: 45 TABLET | Refills: 0 | Status: SHIPPED | OUTPATIENT
Start: 2025-06-24

## 2025-06-24 NOTE — TELEPHONE ENCOUNTER
Test Results    Contacts       Contact Date/Time Type Contact Phone/Fax    06/24/2025 12:04 PM CDT Phone (Incoming) Liz Haider (Self) [phone] (H)            Who ordered the test:  Sydnee Miller MD    Type of test: Mammogram    Date of test:  6/23/25     Where was the test performed:  [place]     What are your questions/concerns?:  PT cannot get into her computer to check the results and doesn;t want to sit and worry about if something is wrong or the test was incomplete. Please contact pt.     Could we send this information to you in [employer] or would you prefer to receive a phone call?:   Patient would prefer a phone call   Okay to leave a detailed message?: Yes at Cell number on file:    Telephone Information:   Mobile [phone]

## 2025-06-26 ENCOUNTER — HOSPITAL ENCOUNTER (OUTPATIENT)
Dept: ULTRASOUND IMAGING | Facility: CLINIC | Age: 75
Discharge: HOME | End: 2025-06-26
Attending: INTERNAL MEDICINE
Payer: COMMERCIAL

## 2025-06-26 ENCOUNTER — HOSPITAL ENCOUNTER (OUTPATIENT)
Dept: MAMMOGRAPHY | Facility: CLINIC | Age: 75
Discharge: HOME | End: 2025-06-26
Attending: INTERNAL MEDICINE
Payer: COMMERCIAL

## 2025-06-26 ENCOUNTER — RESULTS FOLLOW-UP (OUTPATIENT)
Dept: INTERNAL MEDICINE | Facility: CLINIC | Age: 75
End: 2025-06-26

## 2025-06-26 DIAGNOSIS — R92.8 ABNORMAL MAMMOGRAM: ICD-10-CM

## 2025-06-26 PROCEDURE — 76642 ULTRASOUND BREAST LIMITED: CPT | Mod: RT

## 2025-06-26 PROCEDURE — 77061 BREAST TOMOSYNTHESIS UNI: CPT | Mod: RT

## 2025-07-02 ENCOUNTER — DOCUMENTATION ONLY (OUTPATIENT)
Dept: OTHER | Facility: CLINIC | Age: 75
End: 2025-07-02
Payer: COMMERCIAL

## 2025-07-22 DIAGNOSIS — I10 BENIGN ESSENTIAL HYPERTENSION: ICD-10-CM

## 2025-07-22 RX ORDER — SPIRONOLACTONE 25 MG/1
25 TABLET ORAL DAILY
Qty: 90 TABLET | Refills: 0 | Status: SHIPPED | OUTPATIENT
Start: 2025-07-22

## 2025-07-22 RX ORDER — LISINOPRIL 20 MG/1
20 TABLET ORAL 2 TIMES DAILY
Qty: 180 TABLET | Refills: 2 | Status: SHIPPED | OUTPATIENT
Start: 2025-07-22

## 2025-07-22 NOTE — TELEPHONE ENCOUNTER
----- Message from Maria Ines Garcia sent at 1/9/2023  2:07 PM CST -----  Regarding: needs refill  Type:  RX Refill Request    Who Called:  Mirela  Refill or New Rx:  refill  RX Name and Strength:  oxyCODONE-acetaminophen (PERCOCET) 5-325 mg per tablet 28 tablet 0 12/19/2022    Sig - Route: Take 1 tablet by mouth every 6 (six) hours as needed for Pain. - Oral   Sent to pharmacy as: oxyCODONE-acetaminophen (PERCOCET) 5-325 mg per tablet   Earliest Fill Date: 12/19/2022   Notes to Pharmacy: Quantity prescribed more than 7 day supply? No   E-Prescribing Status: Receipt confirmed by pharmacy (12/19/2022 10:50 AM CST)       How is the patient currently taking it? (ex. 1XDay):  see above  Is this a 30 day or 90 day RX:  see above  Preferred Pharmacy with phone number:    [id number] [address] [address]  Phone: [phone] Fax: [phone]      Local or Mail Order:  local  Ordering Provider:  Garth Acuna Call Back Number:  [phone]    Additional Information:         Pt requesting refills.

## 2025-07-23 ENCOUNTER — TRANSFERRED RECORDS (OUTPATIENT)
Dept: HEALTH INFORMATION MANAGEMENT | Facility: CLINIC | Age: 75
End: 2025-07-23
Payer: COMMERCIAL

## 2025-09-02 ENCOUNTER — LAB (OUTPATIENT)
Dept: LAB | Facility: CLINIC | Age: 75
End: 2025-09-02
Payer: COMMERCIAL

## 2025-09-02 DIAGNOSIS — I25.10 CORONARY ARTERY DISEASE INVOLVING NATIVE CORONARY ARTERY OF NATIVE HEART WITHOUT ANGINA PECTORIS: ICD-10-CM

## 2025-09-02 LAB
CHOLEST SERPL-MCNC: 161 MG/DL
FASTING STATUS PATIENT QL REPORTED: YES
HDLC SERPL-MCNC: 72 MG/DL
LDLC SERPL CALC-MCNC: 77 MG/DL
NONHDLC SERPL-MCNC: 89 MG/DL
TRIGL SERPL-MCNC: 62 MG/DL

## 2025-09-02 PROCEDURE — 36415 COLL VENOUS BLD VENIPUNCTURE: CPT

## 2025-09-02 PROCEDURE — 80061 LIPID PANEL: CPT

## (undated) DEVICE — ENDO TROCAR CONMED AIRSEAL BLADELESS 05X120MM IAS5-120LP

## (undated) DEVICE — BLADE CLIPPER 4406

## (undated) DEVICE — TUBING IRRIG TUR Y TYPE 96" LF 6543-01

## (undated) DEVICE — SU VICRYL 2-0 SH 27" J317H

## (undated) DEVICE — SUCTION CANISTER MEDIVAC LINER 3000ML W/LID 65651-530

## (undated) DEVICE — CATH TRAY FOLEY SURESTEP 16FR WDRAIN BAG STLK LATEX A300316A

## (undated) DEVICE — DAVINCI XI OBTURATOR BLADELESS 8MM 470359

## (undated) DEVICE — CATH TRAY FOLEY COUDE SURESTEP 16FR W/URNE MTR STLK A304716A

## (undated) DEVICE — GLOVE PROTEXIS BLUE W/NEU-THERA 7.0  2D73EB70

## (undated) DEVICE — Device

## (undated) DEVICE — SUCTION IRR STRYKERFLOW II W/TIP 250-070-520

## (undated) DEVICE — SU PDS II 3-0 SH 27" Z316H

## (undated) DEVICE — ENDO POUCH UNIVERSAL RETRIEVAL SYSTEM INZII 5MM CD003

## (undated) DEVICE — SU VICRYL 2-0 CT-2 27" J333H

## (undated) DEVICE — SOL WATER IRRIG 3000ML BAG 2B7117

## (undated) DEVICE — SOL NACL 0.9% IRRIG 1000ML BOTTLE 07138-09

## (undated) DEVICE — SU MONOCRYL 4-0 PS-2 18" UND Y496G

## (undated) DEVICE — DAVINCI XI SEAL UNIVERSAL 5-8MM 470361

## (undated) DEVICE — LINEN TOWEL PACK X5 5464

## (undated) DEVICE — GRASPER LAPAROSCOPIC EPIX 5MMX35CM C4130

## (undated) DEVICE — TUBING CONMED AIRSEAL SMOKE EVAC INSUFFLATION ASM-EVAC

## (undated) DEVICE — SOL NACL 0.9% INJ 1000ML BAG 2B1324X

## (undated) DEVICE — DAVINCI HOT SHEARS TIP COVER  400180

## (undated) DEVICE — DAVINCI XI NDL DRIVER MEGA SUTURE CUT 8MM 470309

## (undated) DEVICE — DAVINCI XI DRAPE ARM 470015

## (undated) DEVICE — DAVINCI XI GRASPER FENESTRATED TIP UP 8MM 470347

## (undated) DEVICE — SU ETHIBOND 2-0 SHDA 30" X563H

## (undated) DEVICE — NDL INSUFFLATION 120MM VERRES 172015

## (undated) DEVICE — SU VICRYL 3-0 RB-1 27" J305H

## (undated) DEVICE — GLOVE PROTEXIS BLUE W/NEU-THERA 6.5  2D73EB65

## (undated) DEVICE — GLOVE PROTEXIS W/NEU-THERA 6.5  2D73TE65

## (undated) DEVICE — GOWN IMPERVIOUS ZONED LG

## (undated) DEVICE — ESU GROUND PAD UNIVERSAL W/O CORD

## (undated) DEVICE — GLOVE PROTEXIS W/NEU-THERA 6.0  2D73TE60

## (undated) DEVICE — WIPES FOLEY CARE SURESTEP PROVON DFC100

## (undated) DEVICE — PACK DAVINCI GYN SMA15GDFS1

## (undated) DEVICE — SU VICRYL 0 UR-6 27" J603H

## (undated) DEVICE — DAVINCI XI DRAPE COLUMN 470341

## (undated) DEVICE — SYR 10ML FINGER CONTROL W/O NDL 309695

## (undated) DEVICE — DRAPE LAP W/ARMBOARD 29410

## (undated) DEVICE — SU ETHIBOND 0 CT-2 30" X412H

## (undated) DEVICE — SU PROLENE 2-0 V-7 36" 8977H

## (undated) DEVICE — KIT PATIENT POSITIONING PIGAZZI LATEX FREE 40580

## (undated) DEVICE — DAVINCI XI GRASPER ENDOWRIST PROGRASP 470093

## (undated) DEVICE — DRAPE IOBAN INCISE 23X17" 6650EZ

## (undated) DEVICE — ESU PENCIL W/HOLSTER E2350H

## (undated) RX ORDER — GLYCOPYRROLATE 0.2 MG/ML
INJECTION, SOLUTION INTRAMUSCULAR; INTRAVENOUS
Status: DISPENSED
Start: 2018-10-31

## (undated) RX ORDER — KETOROLAC TROMETHAMINE 30 MG/ML
INJECTION, SOLUTION INTRAMUSCULAR; INTRAVENOUS
Status: DISPENSED
Start: 2018-10-31

## (undated) RX ORDER — LIDOCAINE HYDROCHLORIDE 20 MG/ML
INJECTION, SOLUTION EPIDURAL; INFILTRATION; INTRACAUDAL; PERINEURAL
Status: DISPENSED
Start: 2018-10-31

## (undated) RX ORDER — DEXAMETHASONE SODIUM PHOSPHATE 4 MG/ML
INJECTION, SOLUTION INTRA-ARTICULAR; INTRALESIONAL; INTRAMUSCULAR; INTRAVENOUS; SOFT TISSUE
Status: DISPENSED
Start: 2018-10-31

## (undated) RX ORDER — ACETAMINOPHEN 325 MG/1
TABLET ORAL
Status: DISPENSED
Start: 2018-10-31

## (undated) RX ORDER — ONDANSETRON 2 MG/ML
INJECTION INTRAMUSCULAR; INTRAVENOUS
Status: DISPENSED
Start: 2018-10-31

## (undated) RX ORDER — FENTANYL CITRATE 50 UG/ML
INJECTION, SOLUTION INTRAMUSCULAR; INTRAVENOUS
Status: DISPENSED
Start: 2018-10-31

## (undated) RX ORDER — VECURONIUM BROMIDE 1 MG/ML
INJECTION, POWDER, LYOPHILIZED, FOR SOLUTION INTRAVENOUS
Status: DISPENSED
Start: 2018-10-31

## (undated) RX ORDER — BUPIVACAINE HYDROCHLORIDE AND EPINEPHRINE 5; 5 MG/ML; UG/ML
INJECTION, SOLUTION EPIDURAL; INTRACAUDAL; PERINEURAL
Status: DISPENSED
Start: 2018-10-31

## (undated) RX ORDER — BUPIVACAINE HYDROCHLORIDE 2.5 MG/ML
INJECTION, SOLUTION EPIDURAL; INFILTRATION; INTRACAUDAL
Status: DISPENSED
Start: 2018-10-31

## (undated) RX ORDER — CIPROFLOXACIN 2 MG/ML
INJECTION, SOLUTION INTRAVENOUS
Status: DISPENSED
Start: 2018-10-31

## (undated) RX ORDER — PROPOFOL 10 MG/ML
INJECTION, EMULSION INTRAVENOUS
Status: DISPENSED
Start: 2018-10-31

## (undated) RX ORDER — BUPIVACAINE HYDROCHLORIDE 5 MG/ML
INJECTION, SOLUTION EPIDURAL; INTRACAUDAL
Status: DISPENSED
Start: 2018-10-31

## (undated) RX ORDER — PHENAZOPYRIDINE HYDROCHLORIDE 200 MG/1
TABLET, FILM COATED ORAL
Status: DISPENSED
Start: 2018-10-31

## (undated) RX ORDER — NEOSTIGMINE METHYLSULFATE 1 MG/ML
VIAL (ML) INJECTION
Status: DISPENSED
Start: 2018-10-31

## (undated) RX ORDER — ALBUMIN, HUMAN INJ 5% 5 %
SOLUTION INTRAVENOUS
Status: DISPENSED
Start: 2018-10-31